# Patient Record
Sex: FEMALE | Race: WHITE | NOT HISPANIC OR LATINO | Employment: UNEMPLOYED | URBAN - METROPOLITAN AREA
[De-identification: names, ages, dates, MRNs, and addresses within clinical notes are randomized per-mention and may not be internally consistent; named-entity substitution may affect disease eponyms.]

---

## 2017-01-24 ENCOUNTER — CONVERSION ENCOUNTER (OUTPATIENT)
Dept: PAIN MEDICINE | Facility: CLINIC | Age: 32
End: 2017-01-24

## 2017-01-24 ENCOUNTER — HOSPITAL ENCOUNTER (OUTPATIENT)
Dept: GENERAL RADIOLOGY | Facility: HOSPITAL | Age: 32
Discharge: HOME | End: 2017-01-24
Attending: NURSE PRACTITIONER | Admitting: NURSE PRACTITIONER

## 2017-02-04 ENCOUNTER — HOSPITAL ENCOUNTER (OUTPATIENT)
Dept: OTHER | Facility: HOSPITAL | Age: 32
Discharge: HOME | End: 2017-02-04
Attending: NURSE PRACTITIONER | Admitting: NURSE PRACTITIONER

## 2017-02-06 ENCOUNTER — CONVERSION ENCOUNTER (OUTPATIENT)
Dept: PAIN MEDICINE | Facility: CLINIC | Age: 32
End: 2017-02-06

## 2017-02-06 ENCOUNTER — HOSPITAL ENCOUNTER (OUTPATIENT)
Dept: GENERAL RADIOLOGY | Facility: HOSPITAL | Age: 32
Discharge: HOME | End: 2017-02-06
Attending: INTERNAL MEDICINE | Admitting: INTERNAL MEDICINE

## 2017-02-08 ENCOUNTER — CONVERSION ENCOUNTER (OUTPATIENT)
Dept: PAIN MEDICINE | Facility: CLINIC | Age: 32
End: 2017-02-08

## 2017-02-08 LAB
ALBUMIN SERPL-MCNC: 4.1 G/DL (ref 3.6–5.1)
ALBUMIN/GLOB SERPL: ABNORMAL {RATIO} (ref 1–2.5)
ALP SERPL-CCNC: 72 UNITS/L (ref 33–115)
ALT SERPL-CCNC: 9 UNITS/L (ref 6–29)
ANA SER QL IA: NEGATIVE
AST SERPL-CCNC: 10 UNITS/L (ref 10–30)
BILIRUB SERPL-MCNC: 0.2 MG/DL (ref 0.2–1.2)
BILIRUB UR QL STRIP: NEGATIVE
BUN SERPL-MCNC: 14 MG/DL (ref 7–25)
BUN/CREAT SERPL: ABNORMAL (ref 6–22)
C3 SERPL-MCNC: 181 MG/DL (ref 90–180)
C4 SERPL-MCNC: 18 MG/DL (ref 16–47)
CALCIUM SERPL-MCNC: 9.6 MG/DL (ref 8.6–10.2)
CHLORIDE SERPL-SCNC: 107 MMOL/L (ref 98–110)
CK SERPL-CCNC: 34 UNITS/L (ref 29–143)
CO2 CONTENT VENOUS: 21 MMOL/L (ref 20–31)
COLOR UR: YELLOW
CONV BACTERIA IN URINE MICRO: ABNORMAL /HPF
CONV HYALINE CASTS IN URINE MICRO: ABNORMAL
CONV PROTEIN IN URINE BY AUTOMATED TEST STRIP: NEGATIVE
CONV TOTAL PROTEIN: 7.6 G/DL (ref 6.1–8.1)
CREAT UR-MCNC: 0.69 MG/DL (ref 0.5–1.1)
CRP SERPL-MCNC: 1.48 MG/DL
CYCLIC CITRULLINATED PEPTIDE ANTIBODY: ABNORMAL
ERYTHROCYTE [SEDIMENTATION RATE] IN BLOOD BY WESTERGREN METHOD: 62 MM/HR
GLOBULIN UR ELPH-MCNC: ABNORMAL G/DL (ref 1.9–3.7)
GLUCOSE SERPL-MCNC: 93 MG/DL (ref 65–99)
GLUCOSE UR QL: NEGATIVE G/DL
HGB UR QL STRIP: NEGATIVE
KETONES UR QL STRIP: NEGATIVE
LEUKOCYTE ESTERASE UR QL STRIP: NEGATIVE
NITRITE UR QL STRIP: NEGATIVE
PH UR STRIP.AUTO: ABNORMAL [PH] (ref 5–8)
POTASSIUM SERPL-SCNC: 4.3 MMOL/L (ref 3.5–5.3)
RBC #/AREA URNS HPF: ABNORMAL /[HPF]
SODIUM SERPL-SCNC: 138 MMOL/L (ref 135–146)
SP GR UR: 1.02 (ref 1–1.03)
SQUAMOUS #/AREA URNS HPF: ABNORMAL /HPF
TSH SERPL-ACNC: 5.15 MICROINTL UNITS/ML
WBC #/AREA URNS HPF: ABNORMAL CELLS/HPF

## 2017-02-10 ENCOUNTER — CONVERSION ENCOUNTER (OUTPATIENT)
Dept: PAIN MEDICINE | Facility: CLINIC | Age: 32
End: 2017-02-10

## 2017-03-10 ENCOUNTER — CONVERSION ENCOUNTER (OUTPATIENT)
Dept: PAIN MEDICINE | Facility: CLINIC | Age: 32
End: 2017-03-10

## 2017-04-17 ENCOUNTER — HOSPITAL ENCOUNTER (OUTPATIENT)
Dept: OTHER | Facility: HOSPITAL | Age: 32
Discharge: HOME | End: 2017-04-17
Attending: INTERNAL MEDICINE | Admitting: INTERNAL MEDICINE

## 2017-04-18 LAB
HLA-B27 QL NAA+PROBE: NEGATIVE
INTERPRETATION: NORMAL

## 2017-04-19 LAB
HAV IGM SERPL QL IA: NONREACTIVE
HBV CORE IGM SERPL QL IA: NONREACTIVE
HBV SURFACE AG SERPL QL IA: NONREACTIVE
HCV AB SER DONR QL: NORMAL
HCV AB SER DONR QL: NORMAL

## 2017-04-20 ENCOUNTER — HOSPITAL ENCOUNTER (OUTPATIENT)
Dept: LAB | Facility: HOSPITAL | Age: 32
Setting detail: SPECIMEN
Discharge: HOME | End: 2017-04-20
Attending: INTERNAL MEDICINE | Admitting: INTERNAL MEDICINE

## 2017-04-20 LAB
M TB TUBERC IFN-G BLD QL: NORMAL

## 2017-05-06 ENCOUNTER — HOSPITAL ENCOUNTER (OUTPATIENT)
Dept: ULTRASOUND IMAGING | Facility: HOSPITAL | Age: 32
Discharge: HOME | End: 2017-05-06
Attending: INTERNAL MEDICINE | Admitting: INTERNAL MEDICINE

## 2017-05-08 ENCOUNTER — CONVERSION ENCOUNTER (OUTPATIENT)
Dept: PAIN MEDICINE | Facility: CLINIC | Age: 32
End: 2017-05-08

## 2017-06-19 ENCOUNTER — CONVERSION ENCOUNTER (OUTPATIENT)
Dept: PAIN MEDICINE | Facility: CLINIC | Age: 32
End: 2017-06-19

## 2017-06-28 ENCOUNTER — CONVERSION ENCOUNTER (OUTPATIENT)
Dept: PAIN MEDICINE | Facility: CLINIC | Age: 32
End: 2017-06-28

## 2017-07-06 ENCOUNTER — CONVERSION ENCOUNTER (OUTPATIENT)
Dept: PAIN MEDICINE | Facility: CLINIC | Age: 32
End: 2017-07-06

## 2017-08-07 ENCOUNTER — CONVERSION ENCOUNTER (OUTPATIENT)
Dept: PAIN MEDICINE | Facility: CLINIC | Age: 32
End: 2017-08-07

## 2017-08-07 ENCOUNTER — HOSPITAL ENCOUNTER (OUTPATIENT)
Dept: LAB | Facility: HOSPITAL | Age: 32
Discharge: HOME | End: 2017-08-07
Attending: NURSE PRACTITIONER | Admitting: NURSE PRACTITIONER

## 2017-08-07 LAB
CRP SERPL-MCNC: 1.22 MG/DL (ref 0–0.7)
TSH SERPL-ACNC: 7.61 UIU/ML (ref 0.34–5.6)

## 2017-08-09 ENCOUNTER — CONVERSION ENCOUNTER (OUTPATIENT)
Dept: PAIN MEDICINE | Facility: CLINIC | Age: 32
End: 2017-08-09

## 2017-08-09 LAB — CHROMATIN AB SERPL-ACNC: <20 [IU]/ML (ref 0–20)

## 2017-08-29 ENCOUNTER — HOSPITAL ENCOUNTER (OUTPATIENT)
Dept: PAIN MEDICINE | Facility: HOSPITAL | Age: 32
Discharge: HOME | End: 2017-08-29
Attending: PHYSICAL MEDICINE & REHABILITATION | Admitting: PHYSICAL MEDICINE & REHABILITATION

## 2017-08-29 ENCOUNTER — CONVERSION ENCOUNTER (OUTPATIENT)
Dept: PAIN MEDICINE | Facility: CLINIC | Age: 32
End: 2017-08-29

## 2017-08-29 LAB
AMPHETAMINES UR QL SCN: NEGATIVE
BZE UR QL SCN: NEGATIVE
CREAT 24H UR-MCNC: NORMAL MG/DL
METHADONE UR QL SCN: NEGATIVE
OPIATE CONFIRMATION URINE: NORMAL
THC SERPLBLD CFM-MCNC: NEGATIVE NG/ML

## 2017-09-08 ENCOUNTER — CONVERSION ENCOUNTER (OUTPATIENT)
Dept: PAIN MEDICINE | Facility: CLINIC | Age: 32
End: 2017-09-08

## 2017-09-12 ENCOUNTER — CONVERSION ENCOUNTER (OUTPATIENT)
Dept: PAIN MEDICINE | Facility: CLINIC | Age: 32
End: 2017-09-12

## 2017-09-12 ENCOUNTER — HOSPITAL ENCOUNTER (OUTPATIENT)
Dept: OTHER | Facility: HOSPITAL | Age: 32
Discharge: HOME | End: 2017-09-12
Attending: PHYSICIAN ASSISTANT | Admitting: PHYSICIAN ASSISTANT

## 2017-09-26 ENCOUNTER — CONVERSION ENCOUNTER (OUTPATIENT)
Dept: PAIN MEDICINE | Facility: CLINIC | Age: 32
End: 2017-09-26

## 2017-09-26 ENCOUNTER — HOSPITAL ENCOUNTER (OUTPATIENT)
Dept: PAIN MEDICINE | Facility: HOSPITAL | Age: 32
Discharge: HOME | End: 2017-09-26
Attending: PHYSICAL MEDICINE & REHABILITATION | Admitting: PHYSICAL MEDICINE & REHABILITATION

## 2017-10-06 ENCOUNTER — CONVERSION ENCOUNTER (OUTPATIENT)
Dept: PAIN MEDICINE | Facility: CLINIC | Age: 32
End: 2017-10-06

## 2017-10-20 ENCOUNTER — CONVERSION ENCOUNTER (OUTPATIENT)
Dept: PAIN MEDICINE | Facility: CLINIC | Age: 32
End: 2017-10-20

## 2017-10-23 ENCOUNTER — HOSPITAL ENCOUNTER (OUTPATIENT)
Dept: PAIN MEDICINE | Facility: HOSPITAL | Age: 32
Discharge: HOME | End: 2017-10-23
Attending: PHYSICAL MEDICINE & REHABILITATION | Admitting: PHYSICAL MEDICINE & REHABILITATION

## 2017-10-23 ENCOUNTER — CONVERSION ENCOUNTER (OUTPATIENT)
Dept: PAIN MEDICINE | Facility: CLINIC | Age: 32
End: 2017-10-23

## 2017-10-24 ENCOUNTER — HOSPITAL ENCOUNTER (OUTPATIENT)
Dept: PREOP | Facility: HOSPITAL | Age: 32
Setting detail: HOSPITAL OUTPATIENT SURGERY
Discharge: HOME | End: 2017-10-24
Attending: SURGERY | Admitting: SURGERY

## 2017-11-09 ENCOUNTER — HOSPITAL ENCOUNTER (OUTPATIENT)
Dept: SLEEP MEDICINE | Facility: HOSPITAL | Age: 32
Discharge: HOME | End: 2017-11-09
Attending: INTERNAL MEDICINE | Admitting: INTERNAL MEDICINE

## 2017-11-09 ENCOUNTER — CONVERSION ENCOUNTER (OUTPATIENT)
Dept: PAIN MEDICINE | Facility: CLINIC | Age: 32
End: 2017-11-09

## 2017-11-15 ENCOUNTER — HOSPITAL ENCOUNTER (OUTPATIENT)
Dept: CARDIOLOGY | Facility: HOSPITAL | Age: 32
Discharge: HOME | End: 2017-11-15
Attending: INTERNAL MEDICINE | Admitting: INTERNAL MEDICINE

## 2017-11-20 ENCOUNTER — CONVERSION ENCOUNTER (OUTPATIENT)
Dept: PAIN MEDICINE | Facility: CLINIC | Age: 32
End: 2017-11-20

## 2017-11-20 ENCOUNTER — HOSPITAL ENCOUNTER (OUTPATIENT)
Dept: PAIN MEDICINE | Facility: HOSPITAL | Age: 32
Discharge: HOME | End: 2017-11-20
Attending: PHYSICAL MEDICINE & REHABILITATION | Admitting: PHYSICAL MEDICINE & REHABILITATION

## 2017-12-08 ENCOUNTER — CONVERSION ENCOUNTER (OUTPATIENT)
Dept: PAIN MEDICINE | Facility: CLINIC | Age: 32
End: 2017-12-08

## 2018-01-19 ENCOUNTER — CONVERSION ENCOUNTER (OUTPATIENT)
Dept: PAIN MEDICINE | Facility: CLINIC | Age: 33
End: 2018-01-19

## 2018-01-23 ENCOUNTER — CONVERSION ENCOUNTER (OUTPATIENT)
Dept: PAIN MEDICINE | Facility: CLINIC | Age: 33
End: 2018-01-23

## 2018-01-23 ENCOUNTER — HOSPITAL ENCOUNTER (OUTPATIENT)
Dept: PAIN MEDICINE | Facility: HOSPITAL | Age: 33
Discharge: HOME | End: 2018-01-23
Attending: PHYSICAL MEDICINE & REHABILITATION | Admitting: PHYSICAL MEDICINE & REHABILITATION

## 2018-02-23 ENCOUNTER — CONVERSION ENCOUNTER (OUTPATIENT)
Dept: PAIN MEDICINE | Facility: CLINIC | Age: 33
End: 2018-02-23

## 2018-03-20 ENCOUNTER — HOSPITAL ENCOUNTER (OUTPATIENT)
Dept: PAIN MEDICINE | Facility: HOSPITAL | Age: 33
Discharge: HOME | End: 2018-03-20
Attending: PHYSICAL MEDICINE & REHABILITATION | Admitting: PHYSICAL MEDICINE & REHABILITATION

## 2018-03-20 ENCOUNTER — CONVERSION ENCOUNTER (OUTPATIENT)
Dept: PAIN MEDICINE | Facility: CLINIC | Age: 33
End: 2018-03-20

## 2018-05-07 ENCOUNTER — CONVERSION ENCOUNTER (OUTPATIENT)
Dept: PAIN MEDICINE | Facility: CLINIC | Age: 33
End: 2018-05-07

## 2018-05-15 ENCOUNTER — HOSPITAL ENCOUNTER (OUTPATIENT)
Dept: PAIN MEDICINE | Facility: HOSPITAL | Age: 33
Discharge: HOME | End: 2018-05-15
Attending: PHYSICAL MEDICINE & REHABILITATION | Admitting: PHYSICAL MEDICINE & REHABILITATION

## 2018-05-15 ENCOUNTER — CONVERSION ENCOUNTER (OUTPATIENT)
Dept: PAIN MEDICINE | Facility: CLINIC | Age: 33
End: 2018-05-15

## 2018-06-15 ENCOUNTER — HOSPITAL ENCOUNTER (OUTPATIENT)
Dept: LAB | Facility: HOSPITAL | Age: 33
Discharge: HOME | End: 2018-06-15
Attending: PHYSICIAN ASSISTANT | Admitting: PHYSICIAN ASSISTANT

## 2018-06-21 LAB — NICOTINE UR-MCNC: 37 NG/ML

## 2018-07-09 ENCOUNTER — CONVERSION ENCOUNTER (OUTPATIENT)
Dept: PAIN MEDICINE | Facility: CLINIC | Age: 33
End: 2018-07-09

## 2018-07-16 ENCOUNTER — CONVERSION ENCOUNTER (OUTPATIENT)
Dept: PAIN MEDICINE | Facility: CLINIC | Age: 33
End: 2018-07-16

## 2018-07-19 ENCOUNTER — CONVERSION ENCOUNTER (OUTPATIENT)
Dept: PAIN MEDICINE | Facility: CLINIC | Age: 33
End: 2018-07-19

## 2018-07-19 ENCOUNTER — HOSPITAL ENCOUNTER (OUTPATIENT)
Dept: PAIN MEDICINE | Facility: HOSPITAL | Age: 33
Discharge: HOME | End: 2018-07-19
Attending: PHYSICAL MEDICINE & REHABILITATION | Admitting: PHYSICAL MEDICINE & REHABILITATION

## 2018-07-23 ENCOUNTER — CONVERSION ENCOUNTER (OUTPATIENT)
Dept: PAIN MEDICINE | Facility: CLINIC | Age: 33
End: 2018-07-23

## 2018-07-30 ENCOUNTER — CONVERSION ENCOUNTER (OUTPATIENT)
Dept: PAIN MEDICINE | Facility: CLINIC | Age: 33
End: 2018-07-30

## 2018-09-06 ENCOUNTER — HOSPITAL ENCOUNTER (OUTPATIENT)
Dept: PAIN MEDICINE | Facility: HOSPITAL | Age: 33
Discharge: HOME | End: 2018-09-06
Attending: PHYSICAL MEDICINE & REHABILITATION | Admitting: PHYSICAL MEDICINE & REHABILITATION

## 2018-09-06 ENCOUNTER — CONVERSION ENCOUNTER (OUTPATIENT)
Dept: PAIN MEDICINE | Facility: CLINIC | Age: 33
End: 2018-09-06

## 2018-09-10 ENCOUNTER — CONVERSION ENCOUNTER (OUTPATIENT)
Dept: PAIN MEDICINE | Facility: CLINIC | Age: 33
End: 2018-09-10

## 2018-09-10 ENCOUNTER — HOSPITAL ENCOUNTER (OUTPATIENT)
Dept: OTHER | Facility: HOSPITAL | Age: 33
Discharge: HOME | End: 2018-09-10
Attending: SURGERY | Admitting: SURGERY

## 2018-09-10 LAB
ALBUMIN SERPL-MCNC: 3.9 G/DL (ref 3.5–4.8)
ALBUMIN/GLOB SERPL: 1 {RATIO} (ref 1–1.7)
ALP SERPL-CCNC: 48 IU/L (ref 32–91)
ALT SERPL-CCNC: 36 IU/L (ref 14–54)
ANION GAP SERPL CALC-SCNC: 14.4 MMOL/L (ref 10–20)
AST SERPL-CCNC: 25 IU/L (ref 15–41)
BASOPHILS # BLD AUTO: 0 10*3/UL (ref 0–0.2)
BASOPHILS NFR BLD AUTO: 1 % (ref 0–2)
BILIRUB SERPL-MCNC: 0.7 MG/DL (ref 0.3–1.2)
BUN SERPL-MCNC: 6 MG/DL (ref 8–20)
BUN/CREAT SERPL: 10 (ref 5.4–26.2)
CALCIUM SERPL-MCNC: 9.4 MG/DL (ref 8.9–10.3)
CHLORIDE SERPL-SCNC: 105 MMOL/L (ref 101–111)
CONV CO2: 21 MMOL/L (ref 22–32)
CONV TOTAL PROTEIN: 7.8 G/DL (ref 6.1–7.9)
CREAT UR-MCNC: 0.6 MG/DL (ref 0.4–1)
DIFFERENTIAL METHOD BLD: (no result)
EOSINOPHIL # BLD AUTO: 0 10*3/UL (ref 0–0.3)
EOSINOPHIL # BLD AUTO: 1 % (ref 0–3)
ERYTHROCYTE [DISTWIDTH] IN BLOOD BY AUTOMATED COUNT: 15.2 % (ref 11.5–14.5)
GLOBULIN UR ELPH-MCNC: 3.9 G/DL (ref 2.5–3.8)
GLUCOSE SERPL-MCNC: 91 MG/DL (ref 65–99)
HCT VFR BLD AUTO: 38.4 % (ref 35–49)
HGB BLD-MCNC: 12.6 G/DL (ref 12–15)
IRON SERPL-MCNC: 40 UG/DL (ref 28–170)
LYMPHOCYTES # BLD AUTO: 1.4 10*3/UL (ref 0.8–4.8)
LYMPHOCYTES NFR BLD AUTO: 22 % (ref 18–42)
MCH RBC QN AUTO: 28.3 PG (ref 26–32)
MCHC RBC AUTO-ENTMCNC: 32.8 G/DL (ref 32–36)
MCV RBC AUTO: 86.2 FL (ref 80–94)
MONOCYTES # BLD AUTO: 0.4 10*3/UL (ref 0.1–1.3)
MONOCYTES NFR BLD AUTO: 7 % (ref 2–11)
NEUTROPHILS # BLD AUTO: 4.4 10*3/UL (ref 2.3–8.6)
NEUTROPHILS NFR BLD AUTO: 69 % (ref 50–75)
NRBC BLD AUTO-RTO: 0 /100{WBCS}
NRBC/RBC NFR BLD MANUAL: 0 10*3/UL
PLATELET # BLD AUTO: 335 10*3/UL (ref 150–450)
PMV BLD AUTO: 8.7 FL (ref 7.4–10.4)
POTASSIUM SERPL-SCNC: 3.4 MMOL/L (ref 3.6–5.1)
PREALB SERPL-MCNC: ABNORMAL MG/DL (ref 16–38)
RBC # BLD AUTO: 4.45 10*6/UL (ref 4–5.4)
SODIUM SERPL-SCNC: 137 MMOL/L (ref 136–144)
WBC # BLD AUTO: 6.4 10*3/UL (ref 4.5–11.5)

## 2018-09-13 ENCOUNTER — HOSPITAL ENCOUNTER (OUTPATIENT)
Dept: PREOP | Facility: HOSPITAL | Age: 33
Setting detail: HOSPITAL OUTPATIENT SURGERY
Discharge: HOME | End: 2018-09-13
Attending: SURGERY | Admitting: SURGERY

## 2018-11-01 ENCOUNTER — HOSPITAL ENCOUNTER (OUTPATIENT)
Dept: PAIN MEDICINE | Facility: HOSPITAL | Age: 33
Discharge: HOME | End: 2018-11-01
Attending: PHYSICAL MEDICINE & REHABILITATION | Admitting: PHYSICAL MEDICINE & REHABILITATION

## 2018-11-01 ENCOUNTER — CONVERSION ENCOUNTER (OUTPATIENT)
Dept: PAIN MEDICINE | Facility: CLINIC | Age: 33
End: 2018-11-01

## 2018-11-19 ENCOUNTER — CONVERSION ENCOUNTER (OUTPATIENT)
Dept: PAIN MEDICINE | Facility: CLINIC | Age: 33
End: 2018-11-19

## 2018-12-20 ENCOUNTER — HOSPITAL ENCOUNTER (OUTPATIENT)
Dept: PAIN MEDICINE | Facility: HOSPITAL | Age: 33
Discharge: HOME | End: 2018-12-20
Attending: PHYSICAL MEDICINE & REHABILITATION | Admitting: PHYSICAL MEDICINE & REHABILITATION

## 2018-12-20 ENCOUNTER — CONVERSION ENCOUNTER (OUTPATIENT)
Dept: PAIN MEDICINE | Facility: CLINIC | Age: 33
End: 2018-12-20

## 2019-01-07 ENCOUNTER — HOSPITAL ENCOUNTER (OUTPATIENT)
Dept: OTHER | Facility: HOSPITAL | Age: 34
Setting detail: SPECIMEN
Discharge: HOME | End: 2019-01-07
Attending: SURGERY | Admitting: SURGERY

## 2019-02-10 ENCOUNTER — HOSPITAL ENCOUNTER (OUTPATIENT)
Dept: URGENT CARE | Facility: CLINIC | Age: 34
Discharge: HOME | End: 2019-02-10
Attending: FAMILY MEDICINE | Admitting: FAMILY MEDICINE

## 2019-02-10 ENCOUNTER — CONVERSION ENCOUNTER (OUTPATIENT)
Dept: PAIN MEDICINE | Facility: CLINIC | Age: 34
End: 2019-02-10

## 2019-02-25 ENCOUNTER — CONVERSION ENCOUNTER (OUTPATIENT)
Dept: PAIN MEDICINE | Facility: CLINIC | Age: 34
End: 2019-02-25

## 2019-02-25 ENCOUNTER — HOSPITAL ENCOUNTER (OUTPATIENT)
Dept: PAIN MEDICINE | Facility: HOSPITAL | Age: 34
Discharge: HOME | End: 2019-02-25
Attending: PHYSICAL MEDICINE & REHABILITATION | Admitting: PHYSICAL MEDICINE & REHABILITATION

## 2019-02-25 LAB
ALBUMIN SERPL-MCNC: 4 G/DL (ref 3.5–4.8)
ALBUMIN/GLOB SERPL: 1.1 {RATIO} (ref 1–1.7)
ALP SERPL-CCNC: 55 IU/L (ref 32–91)
ALT SERPL-CCNC: 9 IU/L (ref 14–54)
ANION GAP SERPL CALC-SCNC: 13.8 MMOL/L (ref 10–20)
AST SERPL-CCNC: 11 IU/L (ref 15–41)
BASOPHILS # BLD AUTO: 0 10*3/UL (ref 0–0.2)
BASOPHILS NFR BLD AUTO: 1 % (ref 0–2)
BILIRUB SERPL-MCNC: 0.5 MG/DL (ref 0.3–1.2)
BUN SERPL-MCNC: 17 MG/DL (ref 8–20)
BUN/CREAT SERPL: 21.3 (ref 5.4–26.2)
CALCIUM SERPL-MCNC: 9.6 MG/DL (ref 8.9–10.3)
CHLORIDE SERPL-SCNC: 105 MMOL/L (ref 101–111)
CONV CO2: 25 MMOL/L (ref 22–32)
CONV TOTAL PROTEIN: 7.8 G/DL (ref 6.1–7.9)
CREAT UR-MCNC: 0.8 MG/DL (ref 0.4–1)
DIFFERENTIAL METHOD BLD: (no result)
EOSINOPHIL # BLD AUTO: 0 10*3/UL (ref 0–0.3)
EOSINOPHIL # BLD AUTO: 1 % (ref 0–3)
ERYTHROCYTE [DISTWIDTH] IN BLOOD BY AUTOMATED COUNT: 13.6 % (ref 11.5–14.5)
GLOBULIN UR ELPH-MCNC: 3.8 G/DL (ref 2.5–3.8)
GLUCOSE SERPL-MCNC: 89 MG/DL (ref 65–99)
HCT VFR BLD AUTO: 34.6 % (ref 35–49)
HGB BLD-MCNC: 11.4 G/DL (ref 12–15)
IRON SERPL-MCNC: 67 UG/DL (ref 28–170)
LYMPHOCYTES # BLD AUTO: 2.2 10*3/UL (ref 0.8–4.8)
LYMPHOCYTES NFR BLD AUTO: 36 % (ref 18–42)
MCH RBC QN AUTO: 30.2 PG (ref 26–32)
MCHC RBC AUTO-ENTMCNC: 32.9 G/DL (ref 32–36)
MCV RBC AUTO: 91.9 FL (ref 80–94)
MONOCYTES # BLD AUTO: 0.5 10*3/UL (ref 0.1–1.3)
MONOCYTES NFR BLD AUTO: 9 % (ref 2–11)
NEUTROPHILS # BLD AUTO: 3.2 10*3/UL (ref 2.3–8.6)
NEUTROPHILS NFR BLD AUTO: 53 % (ref 50–75)
NRBC BLD AUTO-RTO: 0 /100{WBCS}
NRBC/RBC NFR BLD MANUAL: 0 10*3/UL
PLATELET # BLD AUTO: 313 10*3/UL (ref 150–450)
PMV BLD AUTO: 8.5 FL (ref 7.4–10.4)
POTASSIUM SERPL-SCNC: 3.8 MMOL/L (ref 3.6–5.1)
RBC # BLD AUTO: 3.77 10*6/UL (ref 4–5.4)
SODIUM SERPL-SCNC: 140 MMOL/L (ref 136–144)
WBC # BLD AUTO: 6 10*3/UL (ref 4.5–11.5)

## 2019-04-29 ENCOUNTER — CONVERSION ENCOUNTER (OUTPATIENT)
Dept: PAIN MEDICINE | Facility: CLINIC | Age: 34
End: 2019-04-29

## 2019-04-29 ENCOUNTER — HOSPITAL ENCOUNTER (OUTPATIENT)
Dept: PAIN MEDICINE | Facility: HOSPITAL | Age: 34
Discharge: HOME | End: 2019-04-29
Attending: PHYSICAL MEDICINE & REHABILITATION | Admitting: PHYSICAL MEDICINE & REHABILITATION

## 2019-05-17 ENCOUNTER — CONVERSION ENCOUNTER (OUTPATIENT)
Dept: FAMILY MEDICINE CLINIC | Facility: CLINIC | Age: 34
End: 2019-05-17

## 2019-05-28 ENCOUNTER — HOSPITAL ENCOUNTER (OUTPATIENT)
Dept: PAIN MEDICINE | Facility: HOSPITAL | Age: 34
Discharge: HOME | End: 2019-05-28
Attending: PHYSICAL MEDICINE & REHABILITATION | Admitting: PHYSICAL MEDICINE & REHABILITATION

## 2019-05-28 ENCOUNTER — CONVERSION ENCOUNTER (OUTPATIENT)
Dept: PAIN MEDICINE | Facility: CLINIC | Age: 34
End: 2019-05-28

## 2019-06-04 VITALS
WEIGHT: 269 LBS | SYSTOLIC BLOOD PRESSURE: 114 MMHG | DIASTOLIC BLOOD PRESSURE: 80 MMHG | HEART RATE: 74 BPM | HEIGHT: 64 IN | DIASTOLIC BLOOD PRESSURE: 74 MMHG | SYSTOLIC BLOOD PRESSURE: 121 MMHG | BODY MASS INDEX: 44.05 KG/M2 | BODY MASS INDEX: 47.8 KG/M2 | HEART RATE: 84 BPM | WEIGHT: 260 LBS | DIASTOLIC BLOOD PRESSURE: 88 MMHG | OXYGEN SATURATION: 99 % | WEIGHT: 265 LBS | HEART RATE: 63 BPM | RESPIRATION RATE: 16 BRPM | DIASTOLIC BLOOD PRESSURE: 83 MMHG | SYSTOLIC BLOOD PRESSURE: 112 MMHG | RESPIRATION RATE: 16 BRPM | OXYGEN SATURATION: 98 % | DIASTOLIC BLOOD PRESSURE: 89 MMHG | DIASTOLIC BLOOD PRESSURE: 81 MMHG | SYSTOLIC BLOOD PRESSURE: 125 MMHG | BODY MASS INDEX: 44.56 KG/M2 | RESPIRATION RATE: 16 BRPM | BODY MASS INDEX: 45.36 KG/M2 | RESPIRATION RATE: 16 BRPM | RESPIRATION RATE: 20 BRPM | DIASTOLIC BLOOD PRESSURE: 86 MMHG | HEART RATE: 69 BPM | DIASTOLIC BLOOD PRESSURE: 88 MMHG | RESPIRATION RATE: 16 BRPM | OXYGEN SATURATION: 99 % | DIASTOLIC BLOOD PRESSURE: 87 MMHG | SYSTOLIC BLOOD PRESSURE: 144 MMHG | SYSTOLIC BLOOD PRESSURE: 124 MMHG | OXYGEN SATURATION: 99 % | SYSTOLIC BLOOD PRESSURE: 107 MMHG | HEIGHT: 64 IN | OXYGEN SATURATION: 100 % | WEIGHT: 261 LBS | BODY MASS INDEX: 44.84 KG/M2 | OXYGEN SATURATION: 100 % | OXYGEN SATURATION: 99 % | WEIGHT: 261 LBS | WEIGHT: 264 LBS | SYSTOLIC BLOOD PRESSURE: 109 MMHG | WEIGHT: 200 LBS | HEART RATE: 71 BPM | RESPIRATION RATE: 16 BRPM | SYSTOLIC BLOOD PRESSURE: 109 MMHG | RESPIRATION RATE: 16 BRPM | WEIGHT: 263 LBS | RESPIRATION RATE: 16 BRPM | SYSTOLIC BLOOD PRESSURE: 146 MMHG | SYSTOLIC BLOOD PRESSURE: 124 MMHG | BODY MASS INDEX: 44.56 KG/M2 | WEIGHT: 262.25 LBS | HEART RATE: 72 BPM | DIASTOLIC BLOOD PRESSURE: 81 MMHG | BODY MASS INDEX: 44.39 KG/M2 | WEIGHT: 261 LBS | RESPIRATION RATE: 16 BRPM | BODY MASS INDEX: 44.59 KG/M2 | SYSTOLIC BLOOD PRESSURE: 120 MMHG | WEIGHT: 270 LBS | HEART RATE: 88 BPM | WEIGHT: 260 LBS | BODY MASS INDEX: 44.8 KG/M2 | HEART RATE: 87 BPM | HEIGHT: 64 IN | BODY MASS INDEX: 45.01 KG/M2 | WEIGHT: 256.38 LBS | SYSTOLIC BLOOD PRESSURE: 119 MMHG | WEIGHT: 259.4 LBS | SYSTOLIC BLOOD PRESSURE: 108 MMHG | HEIGHT: 64 IN | HEIGHT: 64 IN | SYSTOLIC BLOOD PRESSURE: 156 MMHG | WEIGHT: 270 LBS | RESPIRATION RATE: 16 BRPM | WEIGHT: 265 LBS | WEIGHT: 261 LBS | OXYGEN SATURATION: 96 % | DIASTOLIC BLOOD PRESSURE: 81 MMHG | HEIGHT: 64 IN | OXYGEN SATURATION: 97 % | HEART RATE: 67 BPM | DIASTOLIC BLOOD PRESSURE: 67 MMHG | DIASTOLIC BLOOD PRESSURE: 74 MMHG | BODY MASS INDEX: 41.95 KG/M2 | WEIGHT: 261 LBS | HEART RATE: 90 BPM | HEIGHT: 64 IN | RESPIRATION RATE: 16 BRPM | HEART RATE: 78 BPM | SYSTOLIC BLOOD PRESSURE: 119 MMHG | WEIGHT: 257.5 LBS | RESPIRATION RATE: 16 BRPM | OXYGEN SATURATION: 100 % | RESPIRATION RATE: 16 BRPM | SYSTOLIC BLOOD PRESSURE: 109 MMHG | SYSTOLIC BLOOD PRESSURE: 121 MMHG | WEIGHT: 259.8 LBS | OXYGEN SATURATION: 99 % | SYSTOLIC BLOOD PRESSURE: 123 MMHG | HEART RATE: 70 BPM | SYSTOLIC BLOOD PRESSURE: 116 MMHG | SYSTOLIC BLOOD PRESSURE: 127 MMHG | DIASTOLIC BLOOD PRESSURE: 74 MMHG | OXYGEN SATURATION: 99 % | HEART RATE: 64 BPM | BODY MASS INDEX: 46.34 KG/M2 | HEART RATE: 94 BPM | HEART RATE: 86 BPM | DIASTOLIC BLOOD PRESSURE: 84 MMHG | OXYGEN SATURATION: 99 % | WEIGHT: 266 LBS | SYSTOLIC BLOOD PRESSURE: 114 MMHG | HEART RATE: 92 BPM | HEART RATE: 105 BPM | BODY MASS INDEX: 34.15 KG/M2 | OXYGEN SATURATION: 96 % | SYSTOLIC BLOOD PRESSURE: 128 MMHG | SYSTOLIC BLOOD PRESSURE: 125 MMHG | OXYGEN SATURATION: 100 % | WEIGHT: 244.38 LBS | OXYGEN SATURATION: 98 % | DIASTOLIC BLOOD PRESSURE: 83 MMHG | WEIGHT: 233 LBS | BODY MASS INDEX: 44.39 KG/M2 | OXYGEN SATURATION: 99 % | DIASTOLIC BLOOD PRESSURE: 76 MMHG | DIASTOLIC BLOOD PRESSURE: 79 MMHG | HEART RATE: 82 BPM | DIASTOLIC BLOOD PRESSURE: 80 MMHG | HEART RATE: 71 BPM | DIASTOLIC BLOOD PRESSURE: 89 MMHG | SYSTOLIC BLOOD PRESSURE: 108 MMHG | BODY MASS INDEX: 43.71 KG/M2 | WEIGHT: 256 LBS | WEIGHT: 268 LBS | BODY MASS INDEX: 44.8 KG/M2 | OXYGEN SATURATION: 100 % | OXYGEN SATURATION: 98 % | HEIGHT: 64 IN | BODY MASS INDEX: 44.8 KG/M2 | SYSTOLIC BLOOD PRESSURE: 132 MMHG | HEART RATE: 106 BPM | SYSTOLIC BLOOD PRESSURE: 127 MMHG | WEIGHT: 261.25 LBS | WEIGHT: 258 LBS | BODY MASS INDEX: 43.77 KG/M2 | SYSTOLIC BLOOD PRESSURE: 147 MMHG | HEART RATE: 92 BPM | WEIGHT: 227 LBS | HEIGHT: 64 IN | HEIGHT: 64 IN | BODY MASS INDEX: 44.28 KG/M2 | BODY MASS INDEX: 44.01 KG/M2 | DIASTOLIC BLOOD PRESSURE: 68 MMHG | DIASTOLIC BLOOD PRESSURE: 66 MMHG | DIASTOLIC BLOOD PRESSURE: 82 MMHG | HEART RATE: 72 BPM | DIASTOLIC BLOOD PRESSURE: 87 MMHG | HEART RATE: 94 BPM | HEART RATE: 89 BPM | SYSTOLIC BLOOD PRESSURE: 117 MMHG | OXYGEN SATURATION: 99 % | OXYGEN SATURATION: 99 % | WEIGHT: 254.5 LBS | RESPIRATION RATE: 16 BRPM | OXYGEN SATURATION: 100 % | DIASTOLIC BLOOD PRESSURE: 75 MMHG | OXYGEN SATURATION: 98 % | WEIGHT: 280 LBS | SYSTOLIC BLOOD PRESSURE: 128 MMHG | WEIGHT: 269 LBS | BODY MASS INDEX: 38.96 KG/M2 | OXYGEN SATURATION: 99 % | DIASTOLIC BLOOD PRESSURE: 82 MMHG | HEIGHT: 64 IN | DIASTOLIC BLOOD PRESSURE: 89 MMHG | DIASTOLIC BLOOD PRESSURE: 85 MMHG | DIASTOLIC BLOOD PRESSURE: 74 MMHG | HEART RATE: 79 BPM | SYSTOLIC BLOOD PRESSURE: 124 MMHG | HEART RATE: 81 BPM | HEART RATE: 89 BPM | WEIGHT: 264.25 LBS | HEART RATE: 74 BPM | WEIGHT: 256.38 LBS | HEART RATE: 96 BPM | HEART RATE: 90 BPM | DIASTOLIC BLOOD PRESSURE: 79 MMHG | BODY MASS INDEX: 43.68 KG/M2 | BODY MASS INDEX: 39.78 KG/M2 | DIASTOLIC BLOOD PRESSURE: 77 MMHG | RESPIRATION RATE: 16 BRPM | HEART RATE: 73 BPM | SYSTOLIC BLOOD PRESSURE: 104 MMHG | HEART RATE: 80 BPM | OXYGEN SATURATION: 100 % | RESPIRATION RATE: 18 BRPM | DIASTOLIC BLOOD PRESSURE: 72 MMHG

## 2019-06-04 VITALS
HEART RATE: 60 BPM | WEIGHT: 190 LBS | HEIGHT: 64 IN | WEIGHT: 215 LBS | RESPIRATION RATE: 16 BRPM | RESPIRATION RATE: 16 BRPM | HEIGHT: 64 IN | RESPIRATION RATE: 16 BRPM | HEART RATE: 52 BPM | SYSTOLIC BLOOD PRESSURE: 128 MMHG | RESPIRATION RATE: 16 BRPM | OXYGEN SATURATION: 100 % | SYSTOLIC BLOOD PRESSURE: 128 MMHG | HEART RATE: 51 BPM | DIASTOLIC BLOOD PRESSURE: 79 MMHG | BODY MASS INDEX: 36.9 KG/M2 | RESPIRATION RATE: 16 BRPM | OXYGEN SATURATION: 100 % | DIASTOLIC BLOOD PRESSURE: 70 MMHG | SYSTOLIC BLOOD PRESSURE: 97 MMHG | WEIGHT: 215 LBS | HEART RATE: 80 BPM | OXYGEN SATURATION: 100 % | HEIGHT: 64 IN | SYSTOLIC BLOOD PRESSURE: 137 MMHG | BODY MASS INDEX: 32.44 KG/M2 | SYSTOLIC BLOOD PRESSURE: 110 MMHG | BODY MASS INDEX: 33.72 KG/M2 | DIASTOLIC BLOOD PRESSURE: 66 MMHG | HEART RATE: 59 BPM | WEIGHT: 200.8 LBS | BODY MASS INDEX: 34.28 KG/M2 | OXYGEN SATURATION: 100 % | DIASTOLIC BLOOD PRESSURE: 88 MMHG | HEIGHT: 64 IN | WEIGHT: 197.5 LBS | DIASTOLIC BLOOD PRESSURE: 92 MMHG | BODY MASS INDEX: 36.7 KG/M2

## 2019-06-04 VITALS
HEART RATE: 47 BPM | BODY MASS INDEX: 32.58 KG/M2 | SYSTOLIC BLOOD PRESSURE: 112 MMHG | OXYGEN SATURATION: 100 % | HEIGHT: 64 IN | WEIGHT: 190.8 LBS | DIASTOLIC BLOOD PRESSURE: 75 MMHG

## 2019-06-04 VITALS
SYSTOLIC BLOOD PRESSURE: 111 MMHG | RESPIRATION RATE: 16 BRPM | HEIGHT: 64 IN | WEIGHT: 185 LBS | OXYGEN SATURATION: 98 % | HEART RATE: 77 BPM | DIASTOLIC BLOOD PRESSURE: 75 MMHG | BODY MASS INDEX: 31.58 KG/M2

## 2019-06-06 NOTE — PROGRESS NOTES
"HPI: all over pain, especially back, neck and bilateral hip pain, right knee. Dz with fibromyalgia and inflammatory arthritis by Rheum, on DMARDs and Lyrica 100mg BID at initial visit with poor control, pain worsening, 10/10 at worst, 7/10 at best, 8/10   today, always present, prevents work and housework, burning, sharp, varies. Prior notes reviewed, referred for worsening pain, does not have time for PT. Increased to Lyrica 100mg BID then TID, started Celebrex, Cymbalta 30mg then 60mg qHS, fewer flares   but still severe fibromyalgia pain, also LBP and b/l hip pain. Planning bariatric surgery, has to stop Celebrex 1 week before and 6 weeks after. Started Norco 5/325mg, mostly taking qHS, some days BID with worsening pain with cold weather, becoming less   effective, rotated to Percocet 5/325mg. C/o insomnia. Went to ED with severe pain not controlled with Percocet 5/325mg BID prn, now QID prn. Had gastric sleeve surgery, liquid Norco worked well, restarted Percocet with severe N/V, vomited up complete   prescription. Started liquid Norco, working better, but pain worsening, having difficulty with ADLs, increased Norco and Lyrica, helping. Worsening L \"hip\" pain, insomnia, constipation. Requesting NSAID. Had toni. Has FH of autoimmune disorders.      Referring MD: Michelle Narayanan MD  Age: 34 Years Old  Sex: Female  Race: White    Pain Assessment   Location of Pain: pain all over  Previous Pain Rating : 8  Current Pain Ratin  Last Dose Pain medicine Morphine @ 0300  Epidural History None      Past Medical History:     Reviewed history from 2019 and no changes required:        Asthma        Scoliosis        fibromyalgia        No Drug Allergies? T        low back pain        neck pain        Inflammatory Arthritis        Hypothyroidism        SOA and chest pain with activity        sciatica        morbid obesity        fell and hurt lt knee  end of 2018        Numbness and tingling " on arms            Past Surgical History:     Reviewed history from 2019 and no changes required:        2  C-sections '07 & '11        Tubal 2011        right hand ganglion cyst excision 2015        Notes prior complications from anesthesia: Nausea & vomiting        Denies any history of surgical complications.         Gastric sleeve surgery 18        Cholecystectomy     Family History Summary:      Reviewed history Last on 2019 and no changes required:2019  MGM - Has Family History of Weight Disorder - Entered On: 11/10/2017  MGM - Has Family History of Diabetes - Entered On: 11/10/2017  Father - Has Family History of Hypertension - Entered On: 11/10/2017  Father - Has Family History of Heart Disease - Entered On: 11/10/2017    General Comments - FH:  FH Diabetes  FH Heart Disease  FH-Cancer  FH Hypertension  FH anemia  FH blood thinning    Social History:     Reviewed history from 2019 and no changes required:        Patient has never smoked.        Passive Smoke: Y        Alcohol Use: N        Drug Use: N        HIV/High Risk: N        Regular Exercise: Y                Vital Signs:    Patient Profile:    34 Years Old Female  CC:         All over pain  Height:     64 inches (161.29 cm)  Weight:     185 pounds  BMI:        31.75     O2 Sat:     98 %  Temp:       98.5 degrees F oral  Pulse rate: 77 / minute  Resp:       16 per minute  BP Sittin / 75    Patient has a risk of falls? No  Patient in pain?    Yes    Problems: Active problems were reviewed with the patient during this visit.  Medications: Medications were reviewed with the patient during this visit.  Allergies: Allergies were reviewed with the patient during this visit.        Vitals Entered By: Teagan Parson MA (May 28, 2019 10:40 AM)      Risk Factors:     Smoked Tobacco Use:  Never smoker  Smokeless Tobacco Use:  Never  Passive smoke exposure:  yes  Drug use:  no  HIV high-risk behavior:  no  Caffeine use:  0  drinks per day  Alcohol use:  no  Exercise:  yes     Times per week:  5     Type of Exercise:  walking / housework  Seatbelt use:  100 %  Sun Exposure:  rarely    Family History Risk Factors:     Family History of MI in females < 65 years old:  no     Family History of MI in males < 55 years old:  no    Previous Tobacco Use: Signed On - 05/17/2019  Smoked Tobacco Use:  Never smoker     Cigarettes:  Yes -- occasional smoker pack(s) per day,Smokeless Tobacco Use:  Never  Passive smoke exposure:  yes  Drug use:  no  HIV high-risk behavior:  no  Caffeine use:  0 drinks per day        Review of Systems        See HPI    General       Denies fever, chills and fatigue.    Eyes       Complains of vision loss - both eyes.    ENT       Denies decreased hearing and difficulty swallowing.    CV       Denies chest pain or discomfort.    Resp       Complains of shortness of breath.    GI       Complains of constipation.       Denies nausea, vomiting, abdominal pain, diarrhea and stool incontinence.           Denies inability to control bladder.    MS       Complains of joint pain, back pain and neck pain.       Denies joint swelling.    Derm       Denies rash.    Neuro       Complains of headaches.       Denies numbness, weakness and dizziness.      Physical Exam   General  General appearance: well developed, well nourished, no acute distress  Communication ability: communicates by voice, normal quality  Gait and station: Normal    Mental Status Exam   Mental Status: AAO x3  Thought Content: Intact  Behavior: Appropriate    Head and Face   Inspection: normocephalic, no scars, lesions, or masses  Facial strength: no weakness    Respiratory   Auscultation: clear to auscultation bilaterally, no rales, rhonchi, or wheezes    Cardiovascular   Auscultation: RRR, no murmur, rub, or gallop    Abdomen   Abdomen: soft, non-tender, non-distended, no masses, bowel sounds normal    Extremities   Pedal pulses: pulses 2+, symmetric  Periph.  circulation: no cyanosis, clubbing, edema, or varicosities      EXAM:     Neuro   R Leg 2+  L Leg 2+    Strength: Legs Normal  Sensation: SILT in BLE      Total Score Risk Category   Low Risk 0 - 3   Moderate Risk 4 - 7   High Risk > 8     Assessment   Status of Existing Problems:  Assessed Constipation, drug induced as unchanged - Espinoza Rivero MD  Assessed Sacroiliitis, not elsewhere classified as unchanged - Espinoza Rivero MD  Assessed Pain in joint involving multiple sites as deteriorated - Espinoza Rivero MD  Assessed Neck pain, chronic as deteriorated - Espinoza Rivero MD  Assessed Low back pain as deteriorated - Espinoza Rivero MD    Plan   New Prescriptions/Refills:  LYRICA 200 MG ORAL CAPSULE (PREGABALIN) Take 1 cap PO TID  #90 x 1,  05/28/2019, Espinoza Rivero MD  MORPHINE SULFATE 15 MG ORAL TABLET (MORPHINE SULFATE) Take 1 tab PO q8h prn pain. DX:M54.5. DNF until 6/28/19.  #90 x 0,  05/28/2019, Espinoza Rivero MD  MORPHINE SULFATE 15 MG ORAL TABLET (MORPHINE SULFATE) Take 1 tab PO q8h prn pain. DX:M54.5. DNF until 5/29/19.  #90 x 0,  05/28/2019, Espinoza Rivero MD    Updated Medication List:   MORPHINE SULFATE 15 MG ORAL TABLET (MORPHINE SULFATE) Take 1 tab PO q8h prn pain. DX:M54.5. DNF until 6/28/19.  MELOXICAM 7.5 MG ORAL TABLET (MELOXICAM) Take 1 tab PO qdaily - BID prn pain  SILENOR 6 MG ORAL TABLET (DOXEPIN HCL) Take 1 tab PO qHS prn insomnia  FLINSTONES GUMMIES OMEGA-3 DHA ORAL TABLET CHEWABLE (PEDIATRIC MULTIPLE VIT-C-FA) Take one (1) tablet by mouth twice a day  MIRALAX ORAL POWDER (POLYETHYLENE GLYCOL 3350) 1 scoop in 4-6 oz of liquid daily prn constipation  CALCIUM + D TABLET (CALCIUM CITRATE-VITAMIN D TABS) take 2,000 units of Vitamin D with 1500mg of Calcium po daily  RANITIDINE  MG ORAL CAPSULE (RANITIDINE HCL) take one capsule po BID  LEVOTHYROXINE 75 MCG TABLET (LEVOTHYROXINE SODIUM) take 1 tablet by mouth once daily  LYRICA 200 MG ORAL CAPSULE  (PREGABALIN) Take 1 cap PO TID  PROAIR  (90 Base) MCG/ACT INHALATION AEROSOL SOLUTION (ALBUTEROL SULFATE) 2 PUFFS 4 TIMES A DAY AS NEEDED FOR SHORTNESS OF AIR    New Orders:   [facility]  Ofc Vst, Est Level IV [10995]        Patient Instructions:  1)  INspect reviewed, in order. Low risk.  2)  Increased to Lyrica 200mg TID, sedating.  3)  Cont Cymbalta 60mg qHS, helping, and sleeping better.  4)  Stopped Norco 5/325mg, failed Percocet 5/325mg, started liquid Norco 7.5/325mg/15ml 15ml QID prn, #1800. Increased to q4h prn, #2700, no longer working. Cont MS-IR 15mg q8h prn, has pill , helping a lot more.  5)  Failed Mobic 7.5mg qd - BID prn, failed Celebrex.  6)  Cont Silenor.  7)  Cont Relistor 450mg qdaily, failed Miralax, colace.  8)  Cont Precision compounded cream, helping.  9)  Referral to Rheum.  10)  RTC in 2 months for f/u.    ]      Electronically signed by Espinoza Rivero MD on 05/28/2019 at 11:05 AM  ________________________________________________________________________       Disclaimer: Converted Note message may not contain all data elements that existed in the legacy source system. Please see [employer] System for the original note details.

## 2019-06-13 NOTE — PROGRESS NOTES
History of Present Illness:   Chief Complaint: the pt presented for psych eval for bariatric procedure   Symptomatology:  hx of mood/anxiety problems related to fibromyalgia ,  gets frustrated with pain and feels depressed and unhappy      mood is currently relatively stable on meds, rated as  3/10    The pt suffered from excessive weight since getting pregant wiht 2nd child , unable to lose weight after pregnancy, wiht 3rd child - weight is worse , she was not moving a lot due ot fibrolyalgia     This pt had appropriate reasons for seeking bariatric surgery including health issues   The pt also hopes to increase activity level with significant weight loss     The pt reported multiple weight loss attempts including dieting and diet pills , rxd by weight loss md   The most successful attempt was losing    20 lbs        and all past weight loss attempts have only provided temporary relief   The pt denied difficulties perceiving weight loss in the past     Healthy eating habits include 3 meals per day, eggs , yogurt, chicken     Occasional stress eating in reaction to boredom and admitted to eating as a self reward, or while watching movie     Currently 264 lbs   BMI 45  Precipitating and Ameliorating Factors: pain       PAST PSYCHIATRIC HISTORY     Previous Psychiatric Diagnoses   Patient has no prior history of psychiatric illness or treatment.    Past Hospitalizations or Residential Treatment   Locations\Providers: none     Past Outpatient Treatment   Location: only PCP     Prior Psychiatric Medications   Comments: cymbalta       Consequences of Mental Disorder   Consequences: emotional distress    SOCIAL HISTORY     Number of marriages: 1  Number of children: 3  Current Relationship is: supportive  Family of Origin is: unstable  Comments: Marital Status: single , engaged now   Children: 3  Occupation:homemaker  Flu vaccine-2015-negative  Occasional EtOH, denies illicits, smokes 4 cigs/day    Current Living  Situation   Lives with: significant other    Education   Level: some college    Employment   Job Status: unemployed    Hobbies and Leisure Activities   Activity Type: quiet activities    Alcohol use   Freq. drinking: social  Smoking History   Smoking Hx: Never smoker  Pack per day: occasional smoker    Exercise   Exercise sessions (per wk): 2    Illicit Drug Use   Illicit Drugs used: none    FAMILY HISTORY OF MENTAL DISORDERS   fh Grandparents: Non-contributory  fh Mother: Non-contributory  fh Father: Non-contributory  fh Siblings: Non-contributory  fh Other: Non-contributory    Approx length of hospitalization     Medication History Obesity, Thyroid Problems  Additional Med Hx comments: fibromyalgia  asthma, arthritis , bacl pain     Current Allergies (reviewed this update):  * NKDA (Critical)    Current Medications (including medications started this update):   CELEBREX 100 MG ORAL CAPS (CELECOXIB) take one tablet po daily  RANITIDINE  MG ORAL CAPS (RANITIDINE HCL) take one capsule po BID  CELECOXIB 100 MG ORAL CAPS (CELECOXIB) Take 1 tab PO qdaily  CYMBALTA 30 MG ORAL CPEP (DULOXETINE HCL) Take 1 tab PO qHS  LEVO-T 75 MCG ORAL TABS (LEVOTHYROXINE SODIUM) 1 tab po once daily  CYCLOBENZAPRINE HCL 10 MG ORAL TABS (CYCLOBENZAPRINE HCL) Take 0.5 to 1 tab po at bedtime  AZULFIDINE 500 MG ORAL TABS (SULFASALAZINE) one tab po BID  LYRICA 100 MG CAPS (PREGABALIN) Take one by mouth BID  PROAIR  (90 BASE) MCG/ACT AERS (ALBUTEROL SULFATE) 2 PUFFS 4 TIMES A DAY AS NEEDED FOR SHORTNESS OF AIR      Review of Symptoms  Manic Episode/Bipolar Disorder evidenced by symptoms of persistently elevated, expansive, or irritable mood, lasting >7 days (or less if hospitalized):     The patient denies: grandiosity/inc. self esteem, decreased need for sleep, more talkative/pressured speech, FOI/racing thoughts, distractability, increase in goal-direted activity or psychomotor agitation, excessive involvement in pleasurable  activities   with high potential for painful consequences  Hypomania evidenced by persistently elevated, expansive, or irritable mood lasting 4 days:  The patient denies: grandiosity/inc.self esteem, decreased need for sleep, more talkative/pressured speech, FOI/racing thoughts, distractability, increase in goal-directed activity or psychomotor agitation, excessive involvement in pleasurable activities   with high potential for painful consequences  Depression as evidenced by either depressed mood or loss of interest or pleasure lasting > 2 weeks:  The patient complians of: decreased interest, decreased energy/fatigue, excessive guilt/worthlessness  The patient denies: depressed mood, significant weight loss/decreased appetite, insomnia/hypersomnia, psychomotor agitation/retardation, poor concentration or indecisiveness, recurrent thoughts of death/suicidal ideation  Dysthymia as evidenced by depressed mood most of day, more days than not, for at least two years:   The patient complians of: low energy/fatigue, low self-esteem  The patient denies: poor appetite or overeating, insomnia/hypersomnia, poor concentration or decision making, hopelessness  Risk Assessment:  Suicidality:  The Complains of: no prior history  The patient denies: past ideation, past gestures, means and date: (explain below), Family history of suicide, Access to firearms, Past hospitalizations for suicide attempt  Violence:   none   Schizophrenia or other psychotic disorder:  The patient denies: delusions, hallucinations, disorganized behavior, negative symptoms  Cognitive Impairment Disorder / Delirium / Dementia:  Comments: none   Alcohol / Substance related disorder:   Maladaptive pattern with impairment or distress with 3 or more of the following:  The patient denies: tolerance, withdrawal, increased quant/duration,effort of cut down, time spent to obtain/or recover from effect of substance, loss of social/occup/recreat due to  substance,continued use despite physical/psychological problem  Abuse:  as evidenced by pattern of use leading to impariment or distress, as manifested by one or more of the following:   The patient denies: use resulting in failure to fulfill major role obligations, use in situations which are physically hazardous,related legal problems, continued use despite soc/interpersonal problems  Anxiety Disorder:  The complains of: excessive worry for >6 months, irritability, muscle tension  The patient denies: unable to control worry, restlessness, easily fatigued, poor concentration or mind goes blank, sleep disturbance  For Panic Disorder:   future attacks or implications or consequences of attacks or change in behavior    Comments: The patient denies symptoms of anxiety disorder.  For Social Phobia:   The patient denies: fear of social or performance situations/exposed to unfamiliar people or scrutiny of others, exposure provokes anxiety or situationally predisposed panic attack, person recognizes fear is excessive, feared social situation avoided or   endured with intense anxiety  Eating Disorders:   The patient denies: refusal to maintain body weight, fear of gaining weight/becoming fat despite underweight, body weight or shape disturbance, absence of 3 or > consecutive menstrual cycles  The patient denies: recurrent episodes of binge eating with sense of lack or control, recurrent compensatory behavior, binge eating and inappropriate compensatory behaviors both occur at least 2X/week for 3 months, self-evaluation is unduly influenced by   body shape and weight, not exclusively during episodes of anorexia nervosa  ADHD:   The patient denies: 6 or more symptoms>6 months of inattention, lack of attn to details or careless mistakes, diff sustained attention, does not seem to listen, does not follow instructions or finish school work or chores or duties in the work place,   difficulty organizing tasks, avoids and dislikes  tasks that require sustained attn,often loses things needed for tasks or activities, easily distracted by extraneous stimuli, forgetful in daily activitis, 6 or more symptoms>6 months of   hyperactivity-impulsivity, figits/squirms, leaves seat in classroom, often runs about or climbs excessively,difficulty playing or engaging in leisure activities quietly, often on the go, often talks excessively, often blurts out answers, often has   difficulty awaiting turn, often interrupts or intrudes on others    Mental Status Examination    General Appearance:   good hygiene, apperas stated age  Comments:  obese     Behavior: good eye contact, normal motor activity      Attitude/Cooperation:   cooperative and appropriately verbal, patient is pleasant on approach      Speech  : coherent thoughts-organized and easy to follow, normal rate, normal flow and prosody      Thought Processes:   goal directed and associations logical      Thought Content/Perceptions A: WNL  The patient also appears to have  no depersonalization or derealization    Thought Content/Perceptions B:   WNL  The patient also appears to have no obsessions or compulsions or phobias    Risk Assessment:    Suicidality:   not present    Homicidality:   not present      Affect:   expressive and appro. to content with full range      Mood:   euthymic, fluctuates      Insight/Judgement:   intact insight and judgement      Cognitive Functioning and Sensorium:   oriented to person and place and time, memory WNL      Intellect:   estimated (based on interview)      Fund of Knowledge:   adequate      Significant Personality Traits:   deferred; longitudinal view needed    judged reliable      Assessment  Problem List Updates for today's visit   1. Added Body Mass Index 45.0-49.9, adult (ICD-V85.42)  2. Assessed Morbid obesity (ICD-278.01) as stable  3. Assessed presurgery evaluation (ICD-V72.84) as stable  4. Assessed Body Mass Index 45.0-49.9, adult (ICD-V85.42) as  stable  Additional Assessment  No Drug Allergies?       Plan  Orders for today's visit  1. Ordered 29596 PSYCHIATRIC DIAGNOSTIC EVAL W/O MEDS [CPT-15065]  Instructions for today's visit  safety plan was discussed with the patient   no contraindicaions for baritric procedure   Risk and Benefit of medication discussed with patient, patient verbalized understanding.    Disposition: no follow-up planned        Risk Factors:     Smoked Tobacco Use:  Never smoker  Drug use:  none  Alcohol use:  no    PHQ-9  PHQ-9 completed, Score: 8 - Mild Depression      Review of Systems   General:        Denies fevers, chills, sweats.    Musculoskeletal:        Complains of back pain, muscle weakness, stiffness.    Psychiatric:        Complains of depression.         Denies anxiety.            Electronically signed by Luz Marina Mackenzie MD on 09/12/2017 at 10:11 AM  ________________________________________________________________________       Disclaimer: Converted Note message may not contain all data elements that existed in the legacy source system. Please see [employer] for the original note details.

## 2019-07-23 ENCOUNTER — OFFICE VISIT (OUTPATIENT)
Dept: PAIN MEDICINE | Facility: CLINIC | Age: 34
End: 2019-07-23

## 2019-07-23 VITALS
HEART RATE: 53 BPM | RESPIRATION RATE: 16 BRPM | WEIGHT: 180 LBS | TEMPERATURE: 98.4 F | HEIGHT: 63 IN | DIASTOLIC BLOOD PRESSURE: 67 MMHG | BODY MASS INDEX: 31.89 KG/M2 | OXYGEN SATURATION: 100 % | SYSTOLIC BLOOD PRESSURE: 102 MMHG

## 2019-07-23 DIAGNOSIS — G89.29 CHRONIC MIDLINE LOW BACK PAIN, WITH SCIATICA PRESENCE UNSPECIFIED: ICD-10-CM

## 2019-07-23 DIAGNOSIS — M54.5 CHRONIC MIDLINE LOW BACK PAIN, WITH SCIATICA PRESENCE UNSPECIFIED: ICD-10-CM

## 2019-07-23 DIAGNOSIS — M46.1 SACROILIITIS, NOT ELSEWHERE CLASSIFIED (HCC): ICD-10-CM

## 2019-07-23 DIAGNOSIS — M54.2 NECK PAIN, CHRONIC: ICD-10-CM

## 2019-07-23 DIAGNOSIS — F19.90 CURRENT DRUG USE: Primary | ICD-10-CM

## 2019-07-23 DIAGNOSIS — M79.7 FIBROMYALGIA: ICD-10-CM

## 2019-07-23 DIAGNOSIS — G89.29 NECK PAIN, CHRONIC: ICD-10-CM

## 2019-07-23 DIAGNOSIS — K59.03 DRUG-INDUCED CONSTIPATION: Primary | ICD-10-CM

## 2019-07-23 DIAGNOSIS — M25.50 PAIN IN JOINT INVOLVING MULTIPLE SITES: ICD-10-CM

## 2019-07-23 PROCEDURE — 99214 OFFICE O/P EST MOD 30 MIN: CPT | Performed by: PHYSICAL MEDICINE & REHABILITATION

## 2019-07-23 PROCEDURE — G0463 HOSPITAL OUTPT CLINIC VISIT: HCPCS | Performed by: PHYSICAL MEDICINE & REHABILITATION

## 2019-07-23 RX ORDER — DOXEPIN HYDROCHLORIDE 6 MG/1
TABLET ORAL EVERY 24 HOURS
COMMUNITY
Start: 2018-12-20 | End: 2019-08-13

## 2019-07-23 RX ORDER — MORPHINE SULFATE 15 MG/1
15 TABLET ORAL EVERY 6 HOURS PRN
Qty: 120 TABLET | Refills: 0 | Status: SHIPPED | OUTPATIENT
Start: 2019-07-23 | End: 2019-07-23 | Stop reason: SDUPTHER

## 2019-07-23 RX ORDER — RANITIDINE 150 MG/1
CAPSULE ORAL
COMMUNITY
Start: 2017-08-29 | End: 2019-08-13 | Stop reason: SDUPTHER

## 2019-07-23 RX ORDER — D-METHORPHAN/PE/ACETAMINOPHEN 10-5-325MG
CAPSULE ORAL
COMMUNITY
Start: 2018-11-19

## 2019-07-23 RX ORDER — MORPHINE SULFATE 15 MG/1
15 TABLET ORAL EVERY 6 HOURS PRN
Qty: 120 TABLET | Refills: 0 | Status: SHIPPED | OUTPATIENT
Start: 2019-07-23 | End: 2019-09-24 | Stop reason: SDUPTHER

## 2019-07-23 RX ORDER — ALBUTEROL SULFATE 90 UG/1
AEROSOL, METERED RESPIRATORY (INHALATION)
COMMUNITY
Start: 2016-06-17 | End: 2019-08-13 | Stop reason: SDUPTHER

## 2019-07-23 RX ORDER — LEVOTHYROXINE SODIUM 0.07 MG/1
TABLET ORAL EVERY 24 HOURS
COMMUNITY
Start: 2017-10-09 | End: 2019-08-13 | Stop reason: SDUPTHER

## 2019-07-23 RX ORDER — MELOXICAM 7.5 MG/1
TABLET ORAL
COMMUNITY
Start: 2019-02-25 | End: 2019-08-13

## 2019-07-23 RX ORDER — MORPHINE SULFATE 15 MG/1
TABLET ORAL
Refills: 0 | COMMUNITY
Start: 2019-06-28 | End: 2019-07-23 | Stop reason: SDUPTHER

## 2019-07-23 NOTE — PROGRESS NOTES
"Subjective   Alyson Lew is a 34 y.o. female.     all over pain, especially back, neck and bilateral hip pain, right knee. Dz with fibromyalgia and inflammatory arthritis by Rheum, on DMARDs and Lyrica 100mg BID at initial visit with poor control, pain worsening, 10/10 at worst, 7/10 at best, 8/10 today, always present, prevents work and housework, burning, sharp, varies. Prior notes reviewed, referred for worsening pain, does not have time for PT. Increased to Lyrica 100mg BID then TID, started Celebrex, Cymbalta 30mg then 60mg qHS, fewer flares but still severe fibromyalgia pain, also LBP and b/l hip pain. Planning bariatric surgery, has to stop Celebrex 1 week before and 6 weeks after. Started Norco 5/325mg, mostly taking qHS, some days BID with worsening pain with cold weather, becoming less effective, rotated to Percocet 5/325mg. C/o insomnia. Went to ED with severe pain not controlled with Percocet 5/325mg BID prn, now QID prn. Had gastric sleeve surgery, liquid Norco worked well, restarted Percocet with severe N/V, vomited up complete prescription. Started liquid Norco, working better, but pain worsening, having difficulty with ADLs, increased Norco and Lyrica, helping. Worsening L \"hip\" pain, insomnia, constipation. Requesting NSAID. Had toni. Has FH of autoimmune disorders and MS, hasn't heard from Rheum. Needs PT before having injection. MS-IR not lasting 8 hours.         The following portions of the patient's history were reviewed and updated as appropriate: allergies, current medications, past family history, past medical history, past social history, past surgical history and problem list.    Review of Systems   Constitutional: Negative for chills, fatigue and fever.   HENT: Negative for hearing loss and trouble swallowing.    Eyes: Positive for visual disturbance.   Respiratory: Positive for shortness of breath.    Cardiovascular: Negative for chest pain.   Gastrointestinal: Positive for " constipation. Negative for abdominal pain, diarrhea, nausea and vomiting.   Genitourinary: Negative for urinary incontinence.   Musculoskeletal: Positive for arthralgias, back pain and neck pain. Negative for joint swelling and myalgias.   Neurological: Positive for headache. Negative for dizziness, weakness and numbness.       Objective   Physical Exam   Constitutional: She is oriented to person, place, and time. She appears well-developed and well-nourished.   HENT:   Head: Normocephalic and atraumatic.   Eyes: EOM are normal. Pupils are equal, round, and reactive to light.   Neck: Normal range of motion.   Cardiovascular: Normal rate, regular rhythm, normal heart sounds and intact distal pulses.   Pulmonary/Chest: Breath sounds normal.   Abdominal: Soft. Bowel sounds are normal. She exhibits no distension. There is no tenderness.   Neurological: She is alert and oriented to person, place, and time. She has normal strength and normal reflexes. She displays normal reflexes. No sensory deficit.   Psychiatric: She has a normal mood and affect. Her behavior is normal. Thought content normal.         Assessment/Plan   Alyson was seen today for back pain.    Diagnoses and all orders for this visit:    Drug-induced constipation    Pain in joint involving multiple sites    Fibromyalgia    Chronic midline low back pain, with sciatica presence unspecified    Neck pain, chronic    Sacroiliitis, not elsewhere classified (CMS/[facility])        INspect reviewed, in order. Low risk. Repeat UDS today.  Increased to Lyrica 200mg TID, sedating, numbs her lips, but benefit outweighs side effects.  Cont Cymbalta 60mg qHS, helping, and sleeping better.  Stopped Norco 5/325mg, failed Percocet 5/325mg, started liquid Norco 7.5/325mg/15ml 15ml QID prn, #1800. Increased to q4h prn, #2700, no longer working. Increase to MS-IR 15mg q6h prn, has pill , helping a lot more.  Failed Mobic 7.5mg qd - BID prn, failed Celebrex.  Cont Silenor.  Cont  Relistor 450mg qdaily, failed Miralax, colace.  Cont Precision compounded cream, helping.  New referral to Rheum.  Referral to PT for LBP and L hip pain.  RTC in 2 months for f/u.

## 2019-08-13 ENCOUNTER — LAB (OUTPATIENT)
Dept: FAMILY MEDICINE CLINIC | Facility: CLINIC | Age: 34
End: 2019-08-13

## 2019-08-13 ENCOUNTER — OFFICE VISIT (OUTPATIENT)
Dept: FAMILY MEDICINE CLINIC | Facility: CLINIC | Age: 34
End: 2019-08-13

## 2019-08-13 VITALS
TEMPERATURE: 98 F | HEART RATE: 54 BPM | DIASTOLIC BLOOD PRESSURE: 71 MMHG | SYSTOLIC BLOOD PRESSURE: 108 MMHG | HEIGHT: 63 IN | BODY MASS INDEX: 30.65 KG/M2 | OXYGEN SATURATION: 100 % | WEIGHT: 173 LBS

## 2019-08-13 DIAGNOSIS — G47.00 INSOMNIA, UNSPECIFIED TYPE: ICD-10-CM

## 2019-08-13 DIAGNOSIS — J45.909 ASTHMA, UNSPECIFIED ASTHMA SEVERITY, UNSPECIFIED WHETHER COMPLICATED, UNSPECIFIED WHETHER PERSISTENT: ICD-10-CM

## 2019-08-13 DIAGNOSIS — Z00.00 PREVENTATIVE HEALTH CARE: ICD-10-CM

## 2019-08-13 DIAGNOSIS — M25.561 ACUTE PAIN OF RIGHT KNEE: Primary | ICD-10-CM

## 2019-08-13 DIAGNOSIS — E03.9 HYPOTHYROIDISM, UNSPECIFIED TYPE: ICD-10-CM

## 2019-08-13 DIAGNOSIS — R12 HEARTBURN: ICD-10-CM

## 2019-08-13 PROBLEM — M19.90 INFLAMMATORY ARTHRITIS: Status: ACTIVE | Noted: 2017-02-06

## 2019-08-13 PROBLEM — Z01.818 PREOPERATIVE EVALUATION TO RULE OUT SURGICAL CONTRAINDICATION: Status: ACTIVE | Noted: 2017-09-12

## 2019-08-13 PROBLEM — Z79.899 OTHER LONG TERM (CURRENT) DRUG THERAPY: Status: ACTIVE | Noted: 2017-08-29

## 2019-08-13 PROBLEM — E78.5 DYSLIPIDEMIA: Status: ACTIVE | Noted: 2017-10-20

## 2019-08-13 PROBLEM — E66.01 MORBID OBESITY: Status: ACTIVE | Noted: 2017-06-19

## 2019-08-13 PROBLEM — M25.511 SHOULDER PAIN, RIGHT: Status: ACTIVE | Noted: 2017-06-19

## 2019-08-13 PROBLEM — Z71.3 DIETARY COUNSELING AND SURVEILLANCE: Status: ACTIVE | Noted: 2017-07-06

## 2019-08-13 PROBLEM — E51.9 THIAMINE DEFICIENCY: Status: ACTIVE | Noted: 2018-09-18

## 2019-08-13 PROBLEM — S63.632A SPRAIN OF INTERPHALANGEAL JOINT OF RIGHT MIDDLE FINGER: Status: ACTIVE | Noted: 2019-02-10

## 2019-08-13 PROBLEM — Z98.84 STATUS POST BARIATRIC SURGERY: Status: ACTIVE | Noted: 2018-07-30

## 2019-08-13 LAB
ARTICHOKE IGE QN: 122 MG/DL (ref 0–100)
CHOLEST SERPL-MCNC: 161 MG/DL
HDLC SERPL QL: 4.03
HDLC SERPL-MCNC: 40 MG/DL
LDLC/HDLC SERPL: 2.72 {RATIO}
TRIGL SERPL-MCNC: 61 MG/DL
TSH SERPL DL<=0.05 MIU/L-ACNC: 1.08 MIU/ML (ref 0.34–5.6)
VLDLC SERPL-MCNC: 12.2 MG/DL

## 2019-08-13 PROCEDURE — 36415 COLL VENOUS BLD VENIPUNCTURE: CPT

## 2019-08-13 PROCEDURE — 84443 ASSAY THYROID STIM HORMONE: CPT | Performed by: NURSE PRACTITIONER

## 2019-08-13 PROCEDURE — 80061 LIPID PANEL: CPT | Performed by: NURSE PRACTITIONER

## 2019-08-13 PROCEDURE — 99214 OFFICE O/P EST MOD 30 MIN: CPT | Performed by: NURSE PRACTITIONER

## 2019-08-13 RX ORDER — DOXEPIN HYDROCHLORIDE 6 MG/1
TABLET ORAL EVERY 24 HOURS
Status: CANCELLED | OUTPATIENT
Start: 2019-08-13

## 2019-08-13 RX ORDER — ALBUTEROL SULFATE 90 UG/1
2 AEROSOL, METERED RESPIRATORY (INHALATION) EVERY 4 HOURS PRN
Qty: 1 INHALER | Refills: 1 | Status: SHIPPED | OUTPATIENT
Start: 2019-08-13 | End: 2020-11-04 | Stop reason: SDUPTHER

## 2019-08-13 RX ORDER — TRAZODONE HYDROCHLORIDE 150 MG/1
150 TABLET ORAL NIGHTLY
Qty: 30 TABLET | Refills: 0 | Status: SHIPPED | OUTPATIENT
Start: 2019-08-13 | End: 2021-11-29 | Stop reason: SINTOL

## 2019-08-13 RX ORDER — RANITIDINE 150 MG/1
150 CAPSULE ORAL EVERY EVENING
Qty: 90 CAPSULE | Refills: 1 | Status: SHIPPED | OUTPATIENT
Start: 2019-08-13 | End: 2020-01-14 | Stop reason: ALTCHOICE

## 2019-08-13 RX ORDER — MELOXICAM 15 MG/1
15 TABLET ORAL DAILY
Qty: 30 TABLET | Refills: 0 | Status: SHIPPED | OUTPATIENT
Start: 2019-08-13 | End: 2019-10-15

## 2019-08-13 RX ORDER — LEVOTHYROXINE SODIUM 0.07 MG/1
75 TABLET ORAL DAILY
Qty: 30 TABLET | Refills: 1 | Status: SHIPPED | OUTPATIENT
Start: 2019-08-13

## 2019-08-13 NOTE — PATIENT INSTRUCTIONS
Start mobic- no other NSAIDs  Physical therapy  Perform home exercises  Stop silanor and start trazadone for sleep  F/u 1 month  Obtain labs

## 2019-08-13 NOTE — PROGRESS NOTES
"Subjective   Alyson Lew is a 34 y.o. female.     Pt is here today with c/o right knee pain for 2-3 days.  She states that she was walking up the stairs and slipped.  She did not fall on her knee but she did twist it.  She has had a constant burning and throbbing sensation near the knee that is a 8/10.  She is having to limp around the house.  She has not taken any additional pain medication besides what she is on with pain management which is more pain.  Pain does not seem to be improving.  The swelling that she initially had has improved    Having trouble sleeping.  Patient states that the Silenor does not seem to be working regularly anymore.  She is wondering if there is any other medication that she can use to help her fall asleep and stay asleep.  She is also tried melatonin without success    Patient reports that she needs refills on numerous meds.  She hasnt taken synthroid in one month.  She is needing her thyroid level checked.         The following portions of the patient's history were reviewed and updated as appropriate: allergies, current medications, past family history, past medical history, past social history, past surgical history and problem list.    Review of Systems   Constitutional: Negative for chills, diaphoresis, fatigue and fever.   Eyes: Negative for blurred vision and double vision.   Respiratory: Negative for cough, shortness of breath and wheezing.    Cardiovascular: Negative for chest pain and palpitations.   Gastrointestinal: Negative for abdominal pain, nausea and vomiting.   Musculoskeletal: Positive for arthralgias, back pain and myalgias.        Right knee is painful on the lateral side with some bruising.  It is a burning and throbbing sensation       Objective   /71 (BP Location: Right arm, Patient Position: Sitting, Cuff Size: Adult)   Pulse 54   Temp 98 °F (36.7 °C) (Oral)   Ht 160 cm (63\")   Wt 78.5 kg (173 lb)   SpO2 100%   BMI 30.65 kg/m²   Physical Exam "   Constitutional: She is oriented to person, place, and time. She appears well-developed and well-nourished.   HENT:   Head: Normocephalic and atraumatic.   Eyes: EOM are normal. Pupils are equal, round, and reactive to light.   Neck: Normal range of motion. Neck supple.   Cardiovascular: Normal rate and regular rhythm.   Pulmonary/Chest: Effort normal and breath sounds normal.   Abdominal: Soft. Bowel sounds are normal.   Musculoskeletal: Normal range of motion. She exhibits tenderness. She exhibits no edema.   No crepitus or bony abnormality of the right knee.  Bruising noted on the kneecap.  Tenderness associated along the lateral side of the knee.  Slight discomfort with varus and valgus test but no laxity noted   Neurological: She is alert and oriented to person, place, and time.   Skin: Skin is warm and dry.   Bruising right knee   Psychiatric: She has a normal mood and affect. Her behavior is normal. Judgment and thought content normal.         Assessment/Plan   Problems Addressed this Visit        Respiratory    Asthma    Relevant Medications    albuterol sulfate HFA (PROAIR HFA) 108 (90 Base) MCG/ACT inhaler       Digestive    Heartburn    Relevant Medications    ranitidine (ZANTAC) 150 MG capsule       Endocrine    Hypothyroidism    Relevant Medications    levothyroxine (LEVO-T) 75 MCG tablet    Other Relevant Orders    TSH (Completed)       Other    Insomnia    Relevant Medications    traZODone (DESYREL) 150 MG tablet      Other Visit Diagnoses     Acute pain of right knee    -  Primary    Start taking anti-inflammatory medication.  Physical therapy referral  If no improvement call    Relevant Medications    meloxicam (MOBIC) 15 MG tablet    Preventative health care        Relevant Orders    Lipid panel (Completed)              Diagnoses and all orders for this visit:    1. Acute pain of right knee (Primary)  Comments:  Start taking anti-inflammatory medication.  Physical therapy referral  If no  improvement call  Orders:  -     meloxicam (MOBIC) 15 MG tablet; Take 1 tablet by mouth Daily.  Dispense: 30 tablet; Refill: 0    2. Insomnia, unspecified type  Comments:  Will switch from insulin order to trazodone.  Patient to follow-up in 1 month.  Orders:  -     traZODone (DESYREL) 150 MG tablet; Take 1 tablet by mouth Every Night.  Dispense: 30 tablet; Refill: 0    3. Hypothyroidism, unspecified type  -     TSH; Future  -     levothyroxine (LEVO-T) 75 MCG tablet; Take 1 tablet by mouth Daily.  Dispense: 30 tablet; Refill: 1    4. Preventative health care  -     Lipid panel; Future    5. Asthma, unspecified asthma severity, unspecified whether complicated, unspecified whether persistent  -     albuterol sulfate HFA (PROAIR HFA) 108 (90 Base) MCG/ACT inhaler; Inhale 2 puffs Every 4 (Four) Hours As Needed for Wheezing.  Dispense: 1 inhaler; Refill: 1    6. Heartburn  -     ranitidine (ZANTAC) 150 MG capsule; Take 1 capsule by mouth Every Evening.  Dispense: 90 capsule; Refill: 1    Other orders  -     Cancel: Doxepin HCl (SILENOR) 6 MG tablet; Daily.

## 2019-09-05 ENCOUNTER — OFFICE VISIT (OUTPATIENT)
Dept: FAMILY MEDICINE CLINIC | Facility: CLINIC | Age: 34
End: 2019-09-05

## 2019-09-05 VITALS
HEIGHT: 63 IN | HEART RATE: 66 BPM | OXYGEN SATURATION: 98 % | WEIGHT: 170 LBS | SYSTOLIC BLOOD PRESSURE: 117 MMHG | BODY MASS INDEX: 30.12 KG/M2 | DIASTOLIC BLOOD PRESSURE: 80 MMHG

## 2019-09-05 DIAGNOSIS — G47.00 INSOMNIA, UNSPECIFIED TYPE: Primary | ICD-10-CM

## 2019-09-05 DIAGNOSIS — J06.9 UPPER RESPIRATORY TRACT INFECTION, UNSPECIFIED TYPE: ICD-10-CM

## 2019-09-05 DIAGNOSIS — K59.00 CONSTIPATION, UNSPECIFIED CONSTIPATION TYPE: ICD-10-CM

## 2019-09-05 DIAGNOSIS — R42 DIZZINESS: ICD-10-CM

## 2019-09-05 DIAGNOSIS — M25.561 ACUTE PAIN OF RIGHT KNEE: ICD-10-CM

## 2019-09-05 DIAGNOSIS — M25.552 LEFT HIP PAIN: ICD-10-CM

## 2019-09-05 PROCEDURE — 99214 OFFICE O/P EST MOD 30 MIN: CPT | Performed by: NURSE PRACTITIONER

## 2019-09-05 RX ORDER — FLUTICASONE PROPIONATE 50 MCG
2 SPRAY, SUSPENSION (ML) NASAL DAILY
Qty: 1 BOTTLE | Refills: 2 | Status: SHIPPED | OUTPATIENT
Start: 2019-09-05 | End: 2021-11-29

## 2019-09-05 RX ORDER — BENZONATATE 100 MG/1
100 CAPSULE ORAL 3 TIMES DAILY PRN
Qty: 30 CAPSULE | Refills: 0 | Status: SHIPPED | OUTPATIENT
Start: 2019-09-05 | End: 2019-10-15

## 2019-09-05 NOTE — PATIENT INSTRUCTIONS
Try samples of Linzess to see if that helps with constipation.  Call and let me know if you would like a prescription  Drink plenty of water  Physical therapy ordered  Referral to Dr. Dmitri lea as needed for cough  Continue dayquil and nyquil  Start using flonase nasal spray daily  Referral to cardiology for dizziness

## 2019-09-05 NOTE — PROGRESS NOTES
Subjective   Alyson Lew is a 34 y.o. female.     Patient is here today to follow-up on insomnia. She is also having some issues with with her right knee and sinus issues.     She was switched 1 month ago from Silenor to trazodone and she states that she is doing better and getting more restful sleep.  She has been in much better moods when she wakes up.  She is happy with the medication and current dose.    She reports that she injured her right knee when in [place].  She was started on mobic for inflammation, which did not help her at all.  She is on other medication through pain management.  She is still having a burning sensation on the lateral side of her right knee that is intermittent.  It has not improved over the last month.  She reports that her knee has been giving out on her.  She was supposed to be seeing physical therapy but they have not been returning her calls. Movement makes pain worse.  Propping it up tends to help with the burning.    She has been having issues with her left hip for years.  She was told previously that her hip does not sit properly in the socket.  She states that some mornings she has issues even getting out of bed. She was supposed to be seeing PT for this as well but hasn't heard from them.  She is on pain medication and tried taking the mobic without relief. She reports that movement makes it worse.  Some days she doesn't have any pain.    Pt reports that two days ago she started having a sore throat, congestion, cough, and earache.  She tried using lozenges for throat. She has taken dayquil and nyquil yesterday, which helped some. She reports that she has had a fever and some chills.  She is cautious to take medication because of the pain meds that she is on.       Pt reports that if she stands too quickly she will get dizzy and her vision changes for a second.  Her family has a history of POTs.  She has had an CT in the past which was normal.  Reports that it does not happen  "when she turns her head just when she goes from laying to sitting or sitting to standing too quickly.         The following portions of the patient's history were reviewed and updated as appropriate: allergies, current medications, past family history, past medical history, past social history, past surgical history and problem list.    Review of Systems   Constitutional: Negative for chills, fatigue and fever.   HENT: Positive for congestion, ear pain and sore throat.    Eyes: Positive for blurred vision (with position changes).   Respiratory: Positive for cough. Negative for chest tightness and shortness of breath.    Cardiovascular: Negative for chest pain and palpitations.   Gastrointestinal: Positive for constipation. Negative for abdominal pain, diarrhea, nausea and vomiting.   Neurological: Positive for dizziness (with position changes). Negative for headache.       Objective   /80 (BP Location: Right arm, Patient Position: Standing, Cuff Size: Adult)   Pulse 66   Ht 160 cm (63\")   Wt 77.1 kg (170 lb)   SpO2 98%   BMI 30.11 kg/m²   Physical Exam   Constitutional: She is oriented to person, place, and time. She appears well-developed and well-nourished. No distress.   HENT:   Head: Normocephalic and atraumatic.   Eyes: Conjunctivae and EOM are normal. Pupils are equal, round, and reactive to light.   Cardiovascular: Normal rate and regular rhythm.   Pulmonary/Chest: Effort normal and breath sounds normal.   Abdominal: Soft. Bowel sounds are normal.   Musculoskeletal: Normal range of motion. She exhibits tenderness.   Pain associated with varus and valgus and ant drawer test on right knee   Neurological: She is alert and oriented to person, place, and time.   Negative Hallpike maneuver  No nystagmus noted   Skin: Skin is warm and dry.   Psychiatric: She has a normal mood and affect. Her behavior is normal. Judgment and thought content normal.         Assessment/Plan   Problems Addressed this Visit  "       Other    Insomnia - Primary      Other Visit Diagnoses     Acute pain of right knee        No improvement with Mobic  Has not been to physical therapy  Orthopedic referral to Dr. Rizvi    Relevant Orders    Ambulatory Referral to Orthopedic Surgery    Ambulatory Referral to Physical Therapy Evaluate and treat    Left hip pain        Physical therapy and orthopedic referral to Dr. Rizvi    Relevant Orders    Ambulatory Referral to Orthopedic Surgery    Ambulatory Referral to Physical Therapy Evaluate and treat    Dizziness        Unknown etiology  Reports had a negative MRI in the past  Negative orthostatic vital signs  Will refer to cardiology    Relevant Orders    Ambulatory Referral to Cardiology    Upper respiratory tract infection, unspecified type        Likely viral  Use Flonase daily  Continue DayQuil and NyQuil  Tessalon Perles as needed for cough  Drink plenty of fluids    Relevant Medications    fluticasone (FLONASE) 50 MCG/ACT nasal spray    Constipation, unspecified constipation type        Possibly narcotic induced  Increase fluid intake  Unable to tolerate MiraLAX  Given a sample of Linzess 145              Diagnoses and all orders for this visit:    1. Insomnia, unspecified type (Primary)  Comments:  Doing well on trazodone  No changes at this time    2. Acute pain of right knee  Comments:  No improvement with Mobic  Has not been to physical therapy  Orthopedic referral to Dr. Rizvi  Orders:  -     Ambulatory Referral to Orthopedic Surgery  -     Ambulatory Referral to Physical Therapy Evaluate and treat    3. Left hip pain  Comments:  Physical therapy and orthopedic referral to Dr. Rizvi  Orders:  -     Ambulatory Referral to Orthopedic Surgery  -     Ambulatory Referral to Physical Therapy Evaluate and treat    4. Dizziness  Comments:  Unknown etiology  Reports had a negative MRI in the past  Negative orthostatic vital signs  Will refer to cardiology  Orders:  -     Cancel: Ambulatory  Referral to Cardiology  -     Ambulatory Referral to Cardiology    5. Upper respiratory tract infection, unspecified type  Comments:  Likely viral  Use Flonase daily  Continue DayQuil and NyQuil  Tessalon Perles as needed for cough  Drink plenty of fluids  Orders:  -     fluticasone (FLONASE) 50 MCG/ACT nasal spray; 2 sprays into the nostril(s) as directed by provider Daily.  Dispense: 1 bottle; Refill: 2    6. Constipation, unspecified constipation type  Comments:  Possibly narcotic induced  Increase fluid intake  Unable to tolerate MiraLAX  Given a sample of Linzess 145    Other orders  -     benzonatate (TESSALON PERLES) 100 MG capsule; Take 1 capsule by mouth 3 (Three) Times a Day As Needed for Cough.  Dispense: 30 capsule; Refill: 0  -     linaclotide (LINZESS) 145 MCG capsule capsule; Take 1 capsule by mouth Every Morning Before Breakfast.  Dispense: 30 capsule

## 2019-09-24 ENCOUNTER — OFFICE VISIT (OUTPATIENT)
Dept: PAIN MEDICINE | Facility: CLINIC | Age: 34
End: 2019-09-24

## 2019-09-24 VITALS
SYSTOLIC BLOOD PRESSURE: 96 MMHG | RESPIRATION RATE: 16 BRPM | TEMPERATURE: 98.3 F | OXYGEN SATURATION: 100 % | HEART RATE: 63 BPM | WEIGHT: 168 LBS | HEIGHT: 63 IN | DIASTOLIC BLOOD PRESSURE: 60 MMHG | BODY MASS INDEX: 29.77 KG/M2

## 2019-09-24 DIAGNOSIS — M25.50 PAIN IN JOINT INVOLVING MULTIPLE SITES: ICD-10-CM

## 2019-09-24 DIAGNOSIS — G89.29 NECK PAIN, CHRONIC: ICD-10-CM

## 2019-09-24 DIAGNOSIS — M54.2 NECK PAIN, CHRONIC: ICD-10-CM

## 2019-09-24 DIAGNOSIS — M79.601 PAIN IN RIGHT ARM: ICD-10-CM

## 2019-09-24 DIAGNOSIS — G89.29 CHRONIC RIGHT SHOULDER PAIN: ICD-10-CM

## 2019-09-24 DIAGNOSIS — K59.03 DRUG-INDUCED CONSTIPATION: Primary | ICD-10-CM

## 2019-09-24 DIAGNOSIS — M46.1 SACROILIITIS, NOT ELSEWHERE CLASSIFIED (HCC): ICD-10-CM

## 2019-09-24 DIAGNOSIS — M25.511 CHRONIC RIGHT SHOULDER PAIN: ICD-10-CM

## 2019-09-24 DIAGNOSIS — M79.7 FIBROMYALGIA: ICD-10-CM

## 2019-09-24 DIAGNOSIS — M54.5 CHRONIC MIDLINE LOW BACK PAIN, WITH SCIATICA PRESENCE UNSPECIFIED: ICD-10-CM

## 2019-09-24 DIAGNOSIS — G89.29 CHRONIC MIDLINE LOW BACK PAIN, WITH SCIATICA PRESENCE UNSPECIFIED: ICD-10-CM

## 2019-09-24 PROCEDURE — G0463 HOSPITAL OUTPT CLINIC VISIT: HCPCS | Performed by: PHYSICAL MEDICINE & REHABILITATION

## 2019-09-24 PROCEDURE — 99214 OFFICE O/P EST MOD 30 MIN: CPT | Performed by: PHYSICAL MEDICINE & REHABILITATION

## 2019-09-24 RX ORDER — MORPHINE SULFATE 15 MG/1
15 TABLET ORAL EVERY 6 HOURS PRN
Qty: 120 TABLET | Refills: 0 | Status: SHIPPED | OUTPATIENT
Start: 2019-09-24 | End: 2019-09-24 | Stop reason: SDUPTHER

## 2019-09-24 RX ORDER — MORPHINE SULFATE 15 MG/1
15 TABLET ORAL EVERY 6 HOURS PRN
Qty: 120 TABLET | Refills: 0 | Status: SHIPPED | OUTPATIENT
Start: 2019-09-24 | End: 2019-12-17

## 2019-09-24 NOTE — PROGRESS NOTES
"Subjective   Alyson Lew is a 34 y.o. female.     all over pain, especially back, neck and bilateral hip pain, right knee. Dz with fibromyalgia and inflammatory arthritis by Rheum, on DMARDs and Lyrica 100mg BID at initial visit with poor control, pain worsening, 10/10 at worst, 7/10 at best, always present, prevents work and housework, burning, sharp, varies. Prior notes reviewed, referred for worsening pain, does not have time for PT. Increased to Lyrica 100mg BID then TID, started Celebrex, Cymbalta 30mg then 60mg qHS, fewer flares but still severe fibromyalgia pain, also LBP and b/l hip pain. Started Norco 5/325mg, mostly taking qHS, some days BID with worsening pain with cold weather, becoming less effective, rotated to Percocet 5/325mg. C/o insomnia. Went to ED with severe pain not controlled with Percocet 5/325mg BID prn, now QID prn. Had gastric sleeve surgery, liquid Norco worked well, restarted Percocet with severe N/V, vomited up complete prescription. Started liquid Norco, working better, but pain worsening, having difficulty with ADLs, increased Norco and Lyrica, then MS-IR 15mg QID prn with pill . Worsening L \"hip\" pain, insomnia, constipation. Requesting NSAID. Had toni. Has FH of autoimmune disorders and MS, hasn't heard from Rheum. Needs PT before having injection, hasn't been able to start, worsening SIJ pain.         The following portions of the patient's history were reviewed and updated as appropriate: allergies, current medications, past family history, past medical history, past social history, past surgical history and problem list.    Review of Systems   Constitutional: Negative for chills, fatigue and fever.   HENT: Negative for hearing loss and trouble swallowing.    Eyes: Positive for visual disturbance.   Respiratory: Positive for shortness of breath.    Cardiovascular: Negative for chest pain.   Gastrointestinal: Positive for constipation. Negative for abdominal pain, " diarrhea, nausea and vomiting.   Genitourinary: Negative for urinary incontinence.   Musculoskeletal: Positive for arthralgias, back pain and neck pain. Negative for joint swelling and myalgias.   Neurological: Positive for headache. Negative for dizziness, weakness and numbness.       Objective   Physical Exam   Constitutional: She is oriented to person, place, and time. She appears well-developed and well-nourished.   HENT:   Head: Normocephalic and atraumatic.   Eyes: EOM are normal. Pupils are equal, round, and reactive to light.   Neck: Normal range of motion.   Cardiovascular: Normal rate, regular rhythm, normal heart sounds and intact distal pulses.   Pulmonary/Chest: Breath sounds normal.   Abdominal: Soft. Bowel sounds are normal. She exhibits no distension. There is no tenderness.   Neurological: She is alert and oriented to person, place, and time. She has normal strength and normal reflexes. She displays normal reflexes. No sensory deficit.   Psychiatric: She has a normal mood and affect. Her behavior is normal. Thought content normal.         Assessment/Plan   Alyson was seen today for back pain.    Diagnoses and all orders for this visit:    Drug-induced constipation    Pain in joint involving multiple sites    Fibromyalgia    Chronic midline low back pain, with sciatica presence unspecified    Neck pain, chronic    Pain in right arm    Chronic right shoulder pain    Sacroiliitis, not elsewhere classified (CMS/HCC)        INspect reviewed, in order. Low risk. Repeat UDS today.  Increased to Lyrica 200mg TID, sedating, numbs her lips, but benefit outweighs side effects.  Cont Cymbalta 60mg qHS, helping, and sleeping better.  Stopped Norco 5/325mg, failed Percocet 5/325mg, started liquid Norco 7.5/325mg/15ml 15ml QID prn, #1800. Increased to q4h prn, #2700, no longer working. Increase to MS-IR 15mg q6h prn, has pill , helping a lot more.  Failed Mobic 7.5mg qd - BID prn, failed Celebrex.  Cont  Silenor.  Cont Relistor 450mg qdaily, failed Miralax, colace.  Cont [employer]ision compounded cream, helping.  New referral to Rheum.  Referral to PT for LBP and L hip pain.  Schedule L SIJ injection.  RTC for injection then in 3 months for f/u.      ADDEND: SIJ injection denied for not documenting full physical exam. During exam, SIJ pain was identified by positive gianni finger test, TTP over SIJ, CARINA, and Gaenslens, which are performed whenever SIJ pain is suspected.

## 2019-09-25 ENCOUNTER — TELEPHONE (OUTPATIENT)
Dept: PAIN MEDICINE | Facility: HOSPITAL | Age: 34
End: 2019-09-25

## 2019-09-26 ENCOUNTER — TELEPHONE (OUTPATIENT)
Dept: PAIN MEDICINE | Facility: HOSPITAL | Age: 34
End: 2019-09-26

## 2019-09-26 NOTE — TELEPHONE ENCOUNTER
Flector patch was also denied by insurance because it is not on formulary. Can we offer her samples of the ZTlido if we have some available? We also have that form from the mail order pharmacy that may be able to help her out with her insurance or offer her a discounted rate if we send that in.

## 2019-10-08 ENCOUNTER — HOSPITAL ENCOUNTER (OUTPATIENT)
Dept: GENERAL RADIOLOGY | Facility: HOSPITAL | Age: 34
Discharge: HOME | End: 2019-10-08
Admitting: ORTHOPAEDIC SURGERY

## 2019-10-08 ENCOUNTER — HOSPITAL ENCOUNTER (OUTPATIENT)
Dept: GENERAL RADIOLOGY | Facility: HOSPITAL | Age: 34
Discharge: HOME | End: 2019-10-08

## 2019-10-08 DIAGNOSIS — M25.561 RIGHT KNEE PAIN: ICD-10-CM

## 2019-10-08 DIAGNOSIS — M25.551 RIGHT HIP PAIN: ICD-10-CM

## 2019-10-08 PROCEDURE — 73562 X-RAY EXAM OF KNEE 3: CPT

## 2019-10-08 PROCEDURE — 73502 X-RAY EXAM HIP UNI 2-3 VIEWS: CPT

## 2019-10-15 ENCOUNTER — TREATMENT (OUTPATIENT)
Dept: PHYSICAL THERAPY | Facility: CLINIC | Age: 34
End: 2019-10-15

## 2019-10-15 ENCOUNTER — LAB (OUTPATIENT)
Dept: LAB | Facility: HOSPITAL | Age: 34
End: 2019-10-15

## 2019-10-15 ENCOUNTER — OFFICE VISIT (OUTPATIENT)
Dept: CARDIOLOGY | Facility: CLINIC | Age: 34
End: 2019-10-15

## 2019-10-15 VITALS
BODY MASS INDEX: 28.34 KG/M2 | SYSTOLIC BLOOD PRESSURE: 108 MMHG | HEIGHT: 64 IN | HEART RATE: 53 BPM | WEIGHT: 166 LBS | DIASTOLIC BLOOD PRESSURE: 60 MMHG

## 2019-10-15 DIAGNOSIS — M54.50 CHRONIC LOW BACK PAIN, UNSPECIFIED BACK PAIN LATERALITY, UNSPECIFIED WHETHER SCIATICA PRESENT: ICD-10-CM

## 2019-10-15 DIAGNOSIS — R55 NEAR SYNCOPE: ICD-10-CM

## 2019-10-15 DIAGNOSIS — R00.1 BRADYCARDIA, SINUS: ICD-10-CM

## 2019-10-15 DIAGNOSIS — G89.29 CHRONIC LOW BACK PAIN, UNSPECIFIED BACK PAIN LATERALITY, UNSPECIFIED WHETHER SCIATICA PRESENT: ICD-10-CM

## 2019-10-15 DIAGNOSIS — M25.561 RIGHT KNEE PAIN, UNSPECIFIED CHRONICITY: Primary | ICD-10-CM

## 2019-10-15 DIAGNOSIS — M25.552 PAIN OF LEFT HIP JOINT: ICD-10-CM

## 2019-10-15 DIAGNOSIS — R06.09 DYSPNEA ON EXERTION: ICD-10-CM

## 2019-10-15 DIAGNOSIS — R00.2 PALPITATIONS: ICD-10-CM

## 2019-10-15 DIAGNOSIS — R42 DIZZINESS: Primary | ICD-10-CM

## 2019-10-15 LAB
ANION GAP SERPL CALCULATED.3IONS-SCNC: 14.1 MMOL/L (ref 5–15)
BASOPHILS # BLD AUTO: 0.03 10*3/MM3 (ref 0–0.2)
BASOPHILS NFR BLD AUTO: 0.6 % (ref 0–1.5)
BUN BLD-MCNC: 14 MG/DL (ref 6–20)
BUN/CREAT SERPL: 23.3 (ref 7–25)
CALCIUM SPEC-SCNC: 9.5 MG/DL (ref 8.6–10.5)
CHLORIDE SERPL-SCNC: 103 MMOL/L (ref 98–107)
CO2 SERPL-SCNC: 25.9 MMOL/L (ref 22–29)
CREAT BLD-MCNC: 0.6 MG/DL (ref 0.57–1)
DEPRECATED RDW RBC AUTO: 44.2 FL (ref 37–54)
EOSINOPHIL # BLD AUTO: 0.11 10*3/MM3 (ref 0–0.4)
EOSINOPHIL NFR BLD AUTO: 2.3 % (ref 0.3–6.2)
ERYTHROCYTE [DISTWIDTH] IN BLOOD BY AUTOMATED COUNT: 13.7 % (ref 12.3–15.4)
GFR SERPL CREATININE-BSD FRML MDRD: 115 ML/MIN/1.73
GLUCOSE BLD-MCNC: 80 MG/DL (ref 65–99)
HCT VFR BLD AUTO: 33.5 % (ref 34–46.6)
HGB BLD-MCNC: 10.7 G/DL (ref 12–15.9)
IMM GRANULOCYTES # BLD AUTO: 0 10*3/MM3 (ref 0–0.05)
IMM GRANULOCYTES NFR BLD AUTO: 0 % (ref 0–0.5)
LYMPHOCYTES # BLD AUTO: 2.04 10*3/MM3 (ref 0.7–3.1)
LYMPHOCYTES NFR BLD AUTO: 43.2 % (ref 19.6–45.3)
MAGNESIUM SERPL-MCNC: 2.3 MG/DL (ref 1.6–2.6)
MCH RBC QN AUTO: 28.8 PG (ref 26.6–33)
MCHC RBC AUTO-ENTMCNC: 31.9 G/DL (ref 31.5–35.7)
MCV RBC AUTO: 90.3 FL (ref 79–97)
MONOCYTES # BLD AUTO: 0.53 10*3/MM3 (ref 0.1–0.9)
MONOCYTES NFR BLD AUTO: 11.2 % (ref 5–12)
NEUTROPHILS # BLD AUTO: 2.01 10*3/MM3 (ref 1.7–7)
NEUTROPHILS NFR BLD AUTO: 42.7 % (ref 42.7–76)
NRBC BLD AUTO-RTO: 0 /100 WBC (ref 0–0.2)
PLATELET # BLD AUTO: 291 10*3/MM3 (ref 140–450)
PMV BLD AUTO: 10.8 FL (ref 6–12)
POTASSIUM BLD-SCNC: 4.2 MMOL/L (ref 3.5–5.2)
RBC # BLD AUTO: 3.71 10*6/MM3 (ref 3.77–5.28)
SODIUM BLD-SCNC: 143 MMOL/L (ref 136–145)
WBC NRBC COR # BLD: 4.72 10*3/MM3 (ref 3.4–10.8)

## 2019-10-15 PROCEDURE — 97162 PT EVAL MOD COMPLEX 30 MIN: CPT | Performed by: PHYSICAL THERAPIST

## 2019-10-15 PROCEDURE — 99203 OFFICE O/P NEW LOW 30 MIN: CPT | Performed by: NURSE PRACTITIONER

## 2019-10-15 PROCEDURE — 80048 BASIC METABOLIC PNL TOTAL CA: CPT

## 2019-10-15 PROCEDURE — 97110 THERAPEUTIC EXERCISES: CPT | Performed by: PHYSICAL THERAPIST

## 2019-10-15 PROCEDURE — 93000 ELECTROCARDIOGRAM COMPLETE: CPT | Performed by: NURSE PRACTITIONER

## 2019-10-15 PROCEDURE — 85025 COMPLETE CBC W/AUTO DIFF WBC: CPT

## 2019-10-15 PROCEDURE — 83735 ASSAY OF MAGNESIUM: CPT

## 2019-10-15 PROCEDURE — 36415 COLL VENOUS BLD VENIPUNCTURE: CPT

## 2019-10-15 NOTE — PROGRESS NOTES
Cardiology Office New Patient Visit      Primary Care Provider:  Michelle Narayanan MD    Reason for Visit:     Palpitations  Dizziness  Dyspnea with exertion  Near syncope        Subjective     CC:    Palpitations, dizziness, dyspnea with exertion    History of Present Illness       Alyson Lew is a 34 y.o. female.  She comes to the office today for evaluation of complaints of palpitations associated with dizziness and periods of near syncopal episodes that she describes as blacking out.  She had no thomas syncope or falls to the ground associated with this.    She complains of periods of dyspnea with exertion and also at times dyspnea at rest.  She reports feelings of her heart pounding.  She also reports that her blood pressure sometimes runs low and her systolic blood pressures in the 90s she feels like the symptoms are more frequent.    Her past medical history is significant for obesity with previous gastric sleeve surgery.  She has a history of thyroid disorder and fibromyalgia.    She had a previous exercise treadmill test proximal 2 years ago but has had no prior echocardiogram.    Family history significant for pots in her father.  No reported coronary artery disease.    She reports some occasional discomfort in her chest but no true exertional type angina symptoms.    She is never been treated for hypertension or diabetes.  She reports since her gastric sleeve surgery she is lost approximately 100 pounds.        Past Medical History:   Diagnosis Date   • Cancer (CMS/HCC)    • Chronic pain disorder    • Depression    • Extremity pain    • Fibromyalgia, primary    • Headache    • Joint pain    • Low back pain    • Neck pain    • Peripheral neuropathy    • PTSD (post-traumatic stress disorder)    • Stroke (CMS/HCC)     Mini heat stroke       Past Surgical History:   Procedure Laterality Date   •  SECTION      x2   • CHOLECYSTECTOMY     • ENDOSCOPY     • GASTRIC SLEEVE LAPAROSCOPIC     •  HAND SURGERY           Current Outpatient Medications:   •  albuterol sulfate HFA (PROAIR HFA) 108 (90 Base) MCG/ACT inhaler, Inhale 2 puffs Every 4 (Four) Hours As Needed for Wheezing., Disp: 1 inhaler, Rfl: 1  •  diclofenac (FLECTOR) 1.3 % patch patch, Apply 1 patch topically to the appropriate area as directed 2 (Two) Times a Day As Needed (pain)., Disp: 60 patch, Rfl: 2  •  fluticasone (FLONASE) 50 MCG/ACT nasal spray, 2 sprays into the nostril(s) as directed by provider Daily., Disp: 1 bottle, Rfl: 2  •  levothyroxine (LEVO-T) 75 MCG tablet, Take 1 tablet by mouth Daily., Disp: 30 tablet, Rfl: 1  •  Lidocaine (ZTLIDO) 1.8 % patch, Apply 1 patch topically Every 12 (Twelve) Hours As Needed (pain). 12 hours on and 12 hours off, Disp: 30 patch, Rfl: 2  •  linaclotide (LINZESS) 145 MCG capsule capsule, Take 1 capsule by mouth Every Morning Before Breakfast., Disp: 30 capsule, Rfl:   •  LYRICA 200 MG capsule, Take 1 capsule by mouth 3 (Three) Times a Day., Disp: 90 capsule, Rfl: 2  •  Morphine (MSIR) 15 MG tablet, Take 15 mg by mouth Every 6 (Six) Hours As Needed for Severe Pain ., Disp: 120 tablet, Rfl: 0  •  Pediatric Multiple Vit-C-FA (FLINSTONES GUMMIES OMEGA-3 DHA) chewable tablet, FLINSTONES GUMMIES OMEGA-3 DHA CHEW, Disp: , Rfl:   •  ranitidine (ZANTAC) 150 MG capsule, Take 1 capsule by mouth Every Evening., Disp: 90 capsule, Rfl: 1  •  traZODone (DESYREL) 150 MG tablet, Take 1 tablet by mouth Every Night., Disp: 30 tablet, Rfl: 0    Social History     Socioeconomic History   • Marital status:      Spouse name: Not on file   • Number of children: Not on file   • Years of education: Not on file   • Highest education level: Not on file   Tobacco Use   • Smoking status: Never Smoker   Substance and Sexual Activity   • Alcohol use: Yes     Comment: 2 times a year   • Drug use: No       Family History   Problem Relation Age of Onset   • No Known Problems Mother    • Cancer Father    • Coronary artery  "disease Father    • Diabetes Father    • Stroke Father    • Heart attack Father    • Mental illness Brother    • Mental illness Son        The following portions of the patient's history were reviewed and updated as appropriate: allergies, current medications, past family history, past medical history, past social history, past surgical history and problem list.    Review of Systems   Constitution: Positive for weakness. Negative for decreased appetite and diaphoresis.   HENT: Negative for congestion, hearing loss and nosebleeds.    Cardiovascular: Positive for dyspnea on exertion, near-syncope and palpitations. Negative for chest pain, claudication, irregular heartbeat, leg swelling, orthopnea, paroxysmal nocturnal dyspnea and syncope.   Respiratory: Negative for cough, shortness of breath and sleep disturbances due to breathing.    Endocrine: Negative for polyuria.   Hematologic/Lymphatic: Does not bruise/bleed easily.   Skin: Negative for itching and rash.   Musculoskeletal: Positive for joint pain. Negative for back pain, muscle weakness and myalgias.   Gastrointestinal: Negative for abdominal pain, change in bowel habit and nausea.   Genitourinary: Negative for dysuria, flank pain, frequency and hesitancy.   Neurological: Negative for dizziness and tremors.   Psychiatric/Behavioral: Negative for altered mental status. The patient does not have insomnia.        /60   Pulse 53   Ht 162.6 cm (64\")   Wt 75.3 kg (166 lb)   BMI 28.49 kg/m² .    Orthostatic sitting 118/60  54  Orthostatic standing 100/58    55    Objective     Physical Exam   Constitutional: She is oriented to person, place, and time. She appears well-developed and well-nourished. No distress.   HENT:   Head: Normocephalic and atraumatic.   Eyes: Pupils are equal, round, and reactive to light.   Neck: Normal range of motion. Neck supple. No JVD present.   Cardiovascular: Regular rhythm, S1 normal, S2 normal, normal heart sounds and intact " distal pulses. Bradycardia present.   No murmur heard.  Pulmonary/Chest: Effort normal and breath sounds normal.   Abdominal: Soft. Normal appearance. She exhibits no distension. There is no tenderness.   Musculoskeletal: Normal range of motion. She exhibits no edema.   Neurological: She is alert and oriented to person, place, and time.   Skin: Skin is warm and dry.   Psychiatric: She has a normal mood and affect.           ECG 12 Lead  Date/Time: 10/15/2019 1:55 PM  Performed by: Krys Azar APRN  Authorized by: Krys Azar APRN   Comparison: not compared with previous ECG   Previous ECG: no previous ECG available  Rhythm: sinus bradycardia  Rate: bradycardic  BPM: 53  ST Segments: ST segments normal  QRS axis: normal  Other: no other findings    Clinical impression: abnormal EKG                    Diagnoses and all orders for this visit:    1. Dizziness (Primary)  Comments:  Reports episode of dizziness regardless of position.  Orders:  -     Adult Transthoracic Echo Complete W/ Cont if Necessary Per Protocol; Future  -     Holter Monitor - 24 Hour; Future    2. Near syncope  Comments:  Reports brief blackout episodes with no fall or true loss of consciousness  Orders:  -     Adult Transthoracic Echo Complete W/ Cont if Necessary Per Protocol; Future    3. Dyspnea on exertion  Comments:  Reports worsening episodes of dyspnea primarily with exertion but also at rest  Orders:  -     Adult Transthoracic Echo Complete W/ Cont if Necessary Per Protocol; Future    4. Palpitations  Comments:  Complains of periods of time where her heart feels like it beating hard.  Orders:  -     ECG 12 Lead  -     Basic Metabolic Panel; Future  -     Magnesium; Future  -     CBC & Differential; Future  -     Holter Monitor - 24 Hour; Future    5. Bradycardia, sinus  Comments:  Resting heart rate in the 50s    Other orders  -     ECG 12 Lead        Plan:  Would recommend checking baseline BMP magnesium and CBC today.  She had  a recent TSH that was normal  Echocardiogram to assess LV function and rule out structural heart disease with complaints of dyspnea on exertion and near syncope.  24-hour Holter monitor to assess palpitations and overall heart rate.  Advised patient to keep a blood pressure log.  Encouraged her to reduce caffeine intake  Encouraged her to stay well-hydrated with water.  Schedule follow-up for return in approximately 4 weeks to review test to make further recommendations advised to bring her blood pressure log back with her when she comes         Next follow-up appointment:

## 2019-10-15 NOTE — PROGRESS NOTES
Physical Therapy Initial Evaluation and Plan of Care    Patient: Alyson Lew   : 1985  Diagnosis/ICD-10 Code:  Right knee pain, unspecified chronicity [M25.561]  Referring practitioner: DANUTA Rojo  Date of Initial Visit: 10/15/2019  Today's Date: 10/15/2019  Patient seen for 1 session           Subjective Questionnaire: LEFS: 20 points    Subjective Evaluation    History of Present Illness  Mechanism of injury: Patient presents to physical therapy with orders to address R knee pain and L hip pain.  She states that she slipped on stairs on  and has since seen Dr. Rizvi.  He injected her knee and that has helped.   She states that her hip has bothered her for a while.  She states that her hip gets 'stuck' and she sometimes needs assistance to move.   She states that this morning she couldn't move her left leg without assistance because it feels stuck.       She states that she has had x-rays and was told that she has arthritis in her hip and in the past was told that it didn't 'sit right' in the socket.  She is scheduled to return to Dr. Rizvi in 2 weeks.  She states that she sees Dr. Rivero for pain management.  It was suggested that she get an injection in her hip but insurance has denied until she goes to physical therapy.       She is currently on a 24 hr Holter monitor to evaluate cardiac symptoms.  She just came straight from that appointment with the cardiologist.        Patient Occupation: Unable to work due to chronic pain Pain  Current pain rating: 3 (R knee 3/10, L hip 8/10)  At best pain rating: 3 (R knee 3/10, L hip 5/10)  At worst pain rating: 10 (R knee 8/10, L hip 10+/10)  Location: R knee and L hip  Relieving factors: rest and support (Ice and heat don't help)  Aggravating factors: movement, stairs, ambulation, squatting and prolonged positioning    Social Support  Lives in: one-story house  Lives with: spouse, young children and parents (3 kids)    Diagnostic  Tests  Abnormal x-ray: See MD chart.    Treatments  Previous treatment: injection treatment and medication (Injection in R knee)  Patient Goals  Patient goals for therapy: decreased pain       Objective       Static Posture   General Observations  Left leg length discrepancy.     Pelvis   Anterior pelvic tilt  Pelvis (Left): Obliquity.     Active Range of Motion     Lumbar   Flexion: 40 degrees   Extension: 15 degrees   Left lateral flexion: 20 degrees   Right lateral flexion: 20 degrees   Left Knee   Flexion: 137 degrees   Extension: 5 degrees     Right Knee   Flexion: 135 degrees   Extension: 0 degrees     Passive Range of Motion   Left Hip   Flexion: 90 degrees   Extension: 0 degrees   External rotation (90/90): 40 degrees   Internal rotation (90/90): 25 degrees     Strength/Myotome Testing     Left Hip   Planes of Motion   Flexion: 4  Extension: 3+  Abduction: 3+    Right Hip   Planes of Motion   Flexion: 4  Extension: 4  Abduction: 4    Left Knee   Flexion: 4  Extension: 4    Right Knee   Flexion: 3+  Extension: 3+    Left Ankle/Foot   Dorsiflexion: 4    Right Ankle/Foot   Dorsiflexion: 4    Additional Strength Details  Patient had increased complaints of knee and hip pain throughout all testing.    Tests     Additional Tests Details  +Stork on L  ? Eliza's + on R (difficult to accurately assess due to tenderness and pain       Assessment & Plan     Assessment  Impairments: abnormal gait, abnormal or restricted ROM, activity intolerance, impaired balance, impaired physical strength, lacks appropriate home exercise program, pain with function and weight-bearing intolerance  Assessment details: The patient is a 34 y.o. female who presents to physical therapy today for L hip and R knee pain. Upon initial evaluation, the patient demonstrates the impairments listed above. Due to these impairments, the patient is unable to roll over in bed independently when she is in pain. The patient would benefit from skilled  PT services to address functional limitations and impairments and to improve patient quality of life.    Prognosis: fair  Functional Limitations: sleeping, walking, uncomfortable because of pain, moving in bed, standing and unable to perform repetitive tasks  Goals  Plan Goals: STG's: 3 weeks   Patient will report pain level at worse </= 6/10 for improved tolerance to ADLs and functional mobility  Patient will require <3 tactile or verbal cues for proper mechanics during completion of her HEP      LTG's: By Discharge  Patient will report pain levels at worst </= 4/10 for improved tolerance to ADLs and functional mobility  Patient will be able to ambulate unlimited distances without an assistive device and no increased pain  Patient will be able to complete single leg stance on stable surface with no UE support, with supervision for durations > 15 seconds on B LE to decrease risk of falls  Patient will report >50% improvement in her ability to tolerate sitting for durations > 60 minutes per reduction in her symptoms   Patient will report improved levels of overall function as demonstrated by an increase in her reported level of function score on the LEFS to >/= 50/80    Patient will report improvement in their tolerance to sleeping and report waking up </= 1x/night per positional discomfort  Patient will require no tactile or verbal cues for proper mechaics during completion of her HEP for final independence       Plan  Therapy options: will be seen for skilled physical therapy services  Planned modality interventions: cryotherapy, electrical stimulation/Russian stimulation, TENS and thermotherapy (hydrocollator packs)  Other planned modality interventions: Dry Needling  Planned therapy interventions: abdominal trunk stabilization, manual therapy, balance/weight-bearing training, neuromuscular re-education, postural training, soft tissue mobilization, flexibility, functional ROM exercises, gait training and home  exercise program  Duration in visits: 12  Treatment plan discussed with: patient (Spouse)        History # of Personal Factors and/or Comorbidities: MODERATE (1-2)  Examination of Body System(s): # of elements: MODERATE (3)  Clinical Presentation: EVOLVING  Clinical Decision Making: MODERATE      Timed:         Manual Therapy:         mins  10863;     Therapeutic Exercise:    8     mins  89096;     Neuromuscular Aubrey:        mins  41190;    Therapeutic Activity:          mins  32675;     Gait Training:           mins  86054;     Ultrasound:          mins  19825;    Ionto                                   mins   79834  Self Care                            mins   74986  Canalith Repos         mins 23357      Un-Timed:  Electrical Stimulation:         mins  53562 ( );  Dry Needling          mins self-pay  Traction          mins 27513  Low Eval          Mins  85029  Mod Eval     40     Mins  95199  High Eval                            Mins  14929  Re-Eval                               mins  48857        Timed Treatment:   8   mins   Total Treatment:     48   mins    PT SIGNATURE: Mary Rosales, VIC   DATE TREATMENT INITIATED: 10/15/2019    Initial Certification  Certification Period: 1/13/2020  I certify that the therapy services are furnished while this patient is under my care.  The services outlined above are required by this patient, and will be reviewed every 90 days.     PHYSICIAN: Mine Thompson, APRMELONIE      DATE:     Please sign and return via fax to [phone].. Thank you, [facility].

## 2019-10-16 NOTE — PROGRESS NOTES
Please call patient and let her know her electrolytes and magnesium are normal    She is still anemic.     Hgb is 10.3.    Should f/u with PCP regarding this at some point.     Will await other testing

## 2019-10-22 ENCOUNTER — APPOINTMENT (OUTPATIENT)
Dept: PAIN MEDICINE | Facility: HOSPITAL | Age: 34
End: 2019-10-22

## 2019-10-22 ENCOUNTER — TREATMENT (OUTPATIENT)
Dept: PHYSICAL THERAPY | Facility: CLINIC | Age: 34
End: 2019-10-22

## 2019-10-22 DIAGNOSIS — M25.561 RIGHT KNEE PAIN, UNSPECIFIED CHRONICITY: Primary | ICD-10-CM

## 2019-10-22 DIAGNOSIS — M25.552 PAIN OF LEFT HIP JOINT: ICD-10-CM

## 2019-10-22 DIAGNOSIS — M54.50 CHRONIC LOW BACK PAIN, UNSPECIFIED BACK PAIN LATERALITY, UNSPECIFIED WHETHER SCIATICA PRESENT: ICD-10-CM

## 2019-10-22 DIAGNOSIS — G89.29 CHRONIC LOW BACK PAIN, UNSPECIFIED BACK PAIN LATERALITY, UNSPECIFIED WHETHER SCIATICA PRESENT: ICD-10-CM

## 2019-10-22 PROCEDURE — 97110 THERAPEUTIC EXERCISES: CPT | Performed by: PHYSICAL THERAPIST

## 2019-10-22 PROCEDURE — 97140 MANUAL THERAPY 1/> REGIONS: CPT | Performed by: PHYSICAL THERAPIST

## 2019-10-22 PROCEDURE — 97014 ELECTRIC STIMULATION THERAPY: CPT | Performed by: PHYSICAL THERAPIST

## 2019-10-22 NOTE — PROGRESS NOTES
Physical Therapy Daily Progress Note    VISIT#: 2    Subjective   Alyson Lew reports that she is really hurting today.   She states that she got stuck on her back this morning and her fiance had to help her get up.   She states that her hip locked up again.  She is scheduled to return to Dr. Rizvi on Tuesday.  She states that her knee feels a lot better since she had her injection in the R knee but insurance denied the shot in her L hip.      Pain Rating (0-10): 8-9/10    Objective     See Exercise, Manual, and Modality Logs for complete treatment.     Patient Education: Continue current HEP    Assessment & Plan     Assessment  Assessment details: Patient had some difficulty with bed mobility and transitioning from the treatment table.   She responded well to addition of manual therapy and modalities for pain reduction.        Progress per Plan of Care and Progress strengthening /stabilization /functional activity          Timed:         Manual Therapy:    15     mins  59272;     Therapeutic Exercise:    30     mins  08828;     Neuromuscular Aubrey:        mins  89115;    Therapeutic Activity:          mins  90836;     Gait Training:           mins  85574;     Ultrasound:          mins  75405;    Ionto                                   mins   64649  Self Care                            mins   46050  Canalith Repos                   mins  66004    Un-Timed:  Electrical Stimulation:    15     mins  19710 ( );  Dry Needling          mins self-pay  Traction          mins 71731  Low Eval          Mins  46091  Mod Eval          Mins  59951  High Eval                            Mins  61654  Re-Eval                               mins  28031    Timed Treatment:   45   mins   Total Treatment:     60   mins    Mary Rosales PT  [place] License # [id number]  Physical Therapist

## 2019-10-24 ENCOUNTER — HOSPITAL ENCOUNTER (EMERGENCY)
Facility: HOSPITAL | Age: 34
Discharge: HOME | End: 2019-10-25
Admitting: EMERGENCY MEDICINE

## 2019-10-24 VITALS
HEART RATE: 62 BPM | RESPIRATION RATE: 16 BRPM | BODY MASS INDEX: 28.45 KG/M2 | WEIGHT: 166.67 LBS | DIASTOLIC BLOOD PRESSURE: 62 MMHG | SYSTOLIC BLOOD PRESSURE: 99 MMHG | HEIGHT: 64 IN | OXYGEN SATURATION: 100 % | TEMPERATURE: 98.7 F

## 2019-10-24 DIAGNOSIS — S62.639A CLOSED FRACTURE OF TUFT OF DISTAL PHALANX OF FINGER: Primary | ICD-10-CM

## 2019-10-24 PROCEDURE — 99283 EMERGENCY DEPT VISIT LOW MDM: CPT

## 2019-10-24 RX ORDER — IBUPROFEN 400 MG/1
800 TABLET ORAL ONCE
Status: COMPLETED | OUTPATIENT
Start: 2019-10-24 | End: 2019-10-24

## 2019-10-24 RX ADMIN — IBUPROFEN 800 MG: 400 TABLET ORAL at 23:41

## 2019-10-25 ENCOUNTER — APPOINTMENT (OUTPATIENT)
Dept: GENERAL RADIOLOGY | Facility: HOSPITAL | Age: 34
End: 2019-10-25

## 2019-10-25 PROCEDURE — 73140 X-RAY EXAM OF FINGER(S): CPT

## 2019-10-25 NOTE — DISCHARGE INSTRUCTIONS
Wear splint until pain improves.  May use Tylenol or ibuprofen as needed for pain.  Elevate right hand.  Ice.  Follow-up with hand center if symptoms persist.  Return for new or worsening symptoms.

## 2019-10-25 NOTE — ED NOTES
Pt reports smashing her finger in door at [facility]. Small bruise noted to digit.      Li Ricketts, RN  10/24/19 6591

## 2019-10-25 NOTE — ED PROVIDER NOTES
Subjective   Patient is a 34-year-old white female presents today with complaints of injury to her right middle finger.  Patient states she smashed her finger and some glass doors today.  She complains of pain and bruising to the distal right middle finger.  Denies any numbness tingling or weakness.  Denies any injury to the nail.            Review of Systems   Musculoskeletal:        Right middle finger pain       Past Medical History:   Diagnosis Date   • Allergic    • Anemia    • Arthritis    • Asthma    • Cancer (CMS/HCC)    • Chronic constipation    • Chronic pain disorder    • Depression    • Extremity pain    • Fibromyalgia, primary    • Headache    • Injury of back    • Joint pain    • Low back pain    • Neck pain    • Peripheral neuropathy    • PTSD (post-traumatic stress disorder)    • Shortness of breath    • Stroke (CMS/HCC)     Mini heat stroke       Allergies   Allergen Reactions   • Adhesive Tape Hives   • Latex Hives       Past Surgical History:   Procedure Laterality Date   •  SECTION      x2   • CHOLECYSTECTOMY     • ENDOSCOPY     • GASTRIC SLEEVE LAPAROSCOPIC     • HAND SURGERY         Family History   Problem Relation Age of Onset   • No Known Problems Mother    • Cancer Father    • Coronary artery disease Father    • Diabetes Father    • Stroke Father    • Heart attack Father    • Mental illness Brother    • Mental illness Son        Social History     Socioeconomic History   • Marital status:      Spouse name: Not on file   • Number of children: Not on file   • Years of education: Not on file   • Highest education level: Not on file   Tobacco Use   • Smoking status: Current Some Day Smoker     Packs/day: 0.10     Types: Cigarettes     Start date: 2019   • Smokeless tobacco: Never Used   Substance and Sexual Activity   • Alcohol use: No     Frequency: Never   • Drug use: No           Objective   Physical Exam   Constitutional: She appears well-developed.   Vital signs and  triage nurse note reviewed.  Constitutional: Awake, alert; well-developed and well-nourished. No acute distress is noted.  Cardiovascular: Regular rate and rhythm  Pulmonary: Respiratory effort regular nonlabored  Musculoskeletal: Independent range of motion of all extremities.  Tenderness in mild bruising noted to the radial aspect of the right distal middle finger.  There is no nail involvement.  Good cap refill and sensation distally.  Neuro: Alert oriented x3, speech is clear and appropriate, GCS 15.    Skin: Flesh tone, warm, dry, intact; no erythematous or petechial rash or lesion.        Procedures           ED Course                  MDM  Number of Diagnoses or Management Options  Closed fracture of tuft of distal phalanx of finger:      Amount and/or Complexity of Data Reviewed  Tests in the radiology section of CPT®: reviewed and ordered    Risk of Complications, Morbidity, and/or Mortality  General comments: Patient had x-rays obtained.  She is given ibuprofen for pain.    Patient had a limited finger splint applied.    Diagnosis and treatment plan discussed with patient.  Patient agreeable to plan.   I discussed findings with patient who voices understanding of discharge instructions, signs and symptoms requiring return to ED; discharged improved and in stable condition with follow up for re-evaluation.      Patient Progress  Patient progress: stable      Final diagnoses:   Closed fracture of tuft of distal phalanx of finger              Georgina Goldberg, DANUTA  10/25/19 0054

## 2019-10-29 ENCOUNTER — TREATMENT (OUTPATIENT)
Dept: PHYSICAL THERAPY | Facility: CLINIC | Age: 34
End: 2019-10-29

## 2019-10-29 ENCOUNTER — HOSPITAL ENCOUNTER (OUTPATIENT)
Dept: CARDIOLOGY | Facility: HOSPITAL | Age: 34
Setting detail: HOSPITAL OUTPATIENT SURGERY
Discharge: HOME | End: 2019-10-29
Admitting: NURSE PRACTITIONER

## 2019-10-29 VITALS
HEIGHT: 63 IN | SYSTOLIC BLOOD PRESSURE: 104 MMHG | DIASTOLIC BLOOD PRESSURE: 69 MMHG | WEIGHT: 168 LBS | BODY MASS INDEX: 29.77 KG/M2

## 2019-10-29 DIAGNOSIS — M25.552 PAIN OF LEFT HIP JOINT: Primary | ICD-10-CM

## 2019-10-29 DIAGNOSIS — M25.561 RIGHT KNEE PAIN, UNSPECIFIED CHRONICITY: ICD-10-CM

## 2019-10-29 DIAGNOSIS — R06.09 DYSPNEA ON EXERTION: ICD-10-CM

## 2019-10-29 DIAGNOSIS — R55 NEAR SYNCOPE: ICD-10-CM

## 2019-10-29 DIAGNOSIS — M54.50 CHRONIC LOW BACK PAIN, UNSPECIFIED BACK PAIN LATERALITY, UNSPECIFIED WHETHER SCIATICA PRESENT: ICD-10-CM

## 2019-10-29 DIAGNOSIS — G89.29 CHRONIC LOW BACK PAIN, UNSPECIFIED BACK PAIN LATERALITY, UNSPECIFIED WHETHER SCIATICA PRESENT: ICD-10-CM

## 2019-10-29 DIAGNOSIS — R42 DIZZINESS: ICD-10-CM

## 2019-10-29 LAB
BH CV ECHO MEAS - ACS: 2 CM
BH CV ECHO MEAS - AO MAX PG (FULL): 2.8 MMHG
BH CV ECHO MEAS - AO MAX PG: 6.2 MMHG
BH CV ECHO MEAS - AO MEAN PG (FULL): 1.7 MMHG
BH CV ECHO MEAS - AO MEAN PG: 3.3 MMHG
BH CV ECHO MEAS - AO ROOT AREA (BSA CORRECTED): 1.8
BH CV ECHO MEAS - AO ROOT AREA: 8.6 CM^2
BH CV ECHO MEAS - AO ROOT DIAM: 3.3 CM
BH CV ECHO MEAS - AO V2 MAX: 124.9 CM/SEC
BH CV ECHO MEAS - AO V2 MEAN: 85.6 CM/SEC
BH CV ECHO MEAS - AO V2 VTI: 30 CM
BH CV ECHO MEAS - AVA(I,A): 2.4 CM^2
BH CV ECHO MEAS - AVA(I,D): 2.4 CM^2
BH CV ECHO MEAS - AVA(V,A): 2.4 CM^2
BH CV ECHO MEAS - AVA(V,D): 2.4 CM^2
BH CV ECHO MEAS - BSA(HAYCOCK): 1.9 M^2
BH CV ECHO MEAS - BSA: 1.8 M^2
BH CV ECHO MEAS - BZI_BMI: 29.8 KILOGRAMS/M^2
BH CV ECHO MEAS - BZI_METRIC_HEIGHT: 160 CM
BH CV ECHO MEAS - BZI_METRIC_WEIGHT: 76.2 KG
BH CV ECHO MEAS - EDV(CUBED): 114 ML
BH CV ECHO MEAS - EDV(MOD-SP4): 62.8 ML
BH CV ECHO MEAS - EDV(TEICH): 110.1 ML
BH CV ECHO MEAS - EF(CUBED): 58.3 %
BH CV ECHO MEAS - EF(MOD-BP): 66 %
BH CV ECHO MEAS - EF(MOD-SP4): 65.6 %
BH CV ECHO MEAS - EF(TEICH): 49.8 %
BH CV ECHO MEAS - ESV(CUBED): 47.6 ML
BH CV ECHO MEAS - ESV(MOD-SP4): 21.6 ML
BH CV ECHO MEAS - ESV(TEICH): 55.3 ML
BH CV ECHO MEAS - FS: 25.3 %
BH CV ECHO MEAS - IVS/LVPW: 1
BH CV ECHO MEAS - IVSD: 0.68 CM
BH CV ECHO MEAS - LA DIMENSION(2D): 3.1 CM
BH CV ECHO MEAS - LV DIASTOLIC VOL/BSA (35-75): 35 ML/M^2
BH CV ECHO MEAS - LV MASS(C)D: 101.2 GRAMS
BH CV ECHO MEAS - LV MASS(C)DI: 56.4 GRAMS/M^2
BH CV ECHO MEAS - LV MAX PG: 3.4 MMHG
BH CV ECHO MEAS - LV MEAN PG: 1.6 MMHG
BH CV ECHO MEAS - LV SYSTOLIC VOL/BSA (12-30): 12 ML/M^2
BH CV ECHO MEAS - LV V1 MAX: 92.6 CM/SEC
BH CV ECHO MEAS - LV V1 MEAN: 60.3 CM/SEC
BH CV ECHO MEAS - LV V1 VTI: 22.3 CM
BH CV ECHO MEAS - LVIDD: 4.8 CM
BH CV ECHO MEAS - LVIDS: 3.6 CM
BH CV ECHO MEAS - LVOT AREA: 3.2 CM^2
BH CV ECHO MEAS - LVOT DIAM: 2 CM
BH CV ECHO MEAS - LVPWD: 0.65 CM
BH CV ECHO MEAS - MR MAX PG: 46.4 MMHG
BH CV ECHO MEAS - MR MAX VEL: 340.6 CM/SEC
BH CV ECHO MEAS - MV A MAX VEL: 44.3 CM/SEC
BH CV ECHO MEAS - MV DEC SLOPE: 432.6 CM/SEC^2
BH CV ECHO MEAS - MV DEC TIME: 0.22 SEC
BH CV ECHO MEAS - MV E MAX VEL: 96.1 CM/SEC
BH CV ECHO MEAS - MV E/A: 2.2
BH CV ECHO MEAS - MV MAX PG: 4.5 MMHG
BH CV ECHO MEAS - MV MEAN PG: 0.95 MMHG
BH CV ECHO MEAS - MV V2 MAX: 106.3 CM/SEC
BH CV ECHO MEAS - MV V2 MEAN: 40.1 CM/SEC
BH CV ECHO MEAS - MV V2 VTI: 33.8 CM
BH CV ECHO MEAS - MVA(VTI): 2.1 CM^2
BH CV ECHO MEAS - PA MAX PG (FULL): 1.1 MMHG
BH CV ECHO MEAS - PA MAX PG: 3.4 MMHG
BH CV ECHO MEAS - PA V2 MAX: 91.9 CM/SEC
BH CV ECHO MEAS - RV MAX PG: 2.3 MMHG
BH CV ECHO MEAS - RV MEAN PG: 1.6 MMHG
BH CV ECHO MEAS - RV V1 MAX: 75.9 CM/SEC
BH CV ECHO MEAS - RV V1 MEAN: 61.9 CM/SEC
BH CV ECHO MEAS - RV V1 VTI: 28.6 CM
BH CV ECHO MEAS - RVDD: 2.6 CM
BH CV ECHO MEAS - SI(AO): 144.3 ML/M^2
BH CV ECHO MEAS - SI(CUBED): 37 ML/M^2
BH CV ECHO MEAS - SI(LVOT): 39.6 ML/M^2
BH CV ECHO MEAS - SI(MOD-SP4): 23 ML/M^2
BH CV ECHO MEAS - SI(TEICH): 30.5 ML/M^2
BH CV ECHO MEAS - SV(AO): 259.1 ML
BH CV ECHO MEAS - SV(CUBED): 66.4 ML
BH CV ECHO MEAS - SV(LVOT): 71.1 ML
BH CV ECHO MEAS - SV(MOD-SP4): 41.2 ML
BH CV ECHO MEAS - SV(TEICH): 54.8 ML

## 2019-10-29 PROCEDURE — 93306 TTE W/DOPPLER COMPLETE: CPT

## 2019-10-29 PROCEDURE — 97140 MANUAL THERAPY 1/> REGIONS: CPT | Performed by: PHYSICAL THERAPIST

## 2019-10-29 PROCEDURE — 93306 TTE W/DOPPLER COMPLETE: CPT | Performed by: INTERNAL MEDICINE

## 2019-10-29 PROCEDURE — 97014 ELECTRIC STIMULATION THERAPY: CPT | Performed by: PHYSICAL THERAPIST

## 2019-10-29 PROCEDURE — 97110 THERAPEUTIC EXERCISES: CPT | Performed by: PHYSICAL THERAPIST

## 2019-10-29 NOTE — PROGRESS NOTES
Physical Therapy Daily Progress Note    VISIT#: 3    Subjective   Alyson Lew reports that her hip got really flared up last night.   She states that she also fractured her middle finger last week.       Pain Rating (0-10): 7    Objective     See Exercise, Manual, and Modality Logs for complete treatment.     Patient Education: Continue gentle stretching and home program    Assessment & Plan     Assessment  Assessment details: Patient required therapist assist with transition from supine to sidelying but could transfer from sidelying to sit independently.  She still has moderate daily pain.          Progress per Plan of Care and Progress strengthening /stabilization /functional activity            Timed:         Manual Therapy:    15     mins  39204;     Therapeutic Exercise:    25     mins  18490;     Neuromuscular Aubrey:        mins  54790;    Therapeutic Activity:          mins  61324;     Gait Training:           mins  17555;     Ultrasound:          mins  63107;    Ionto                                   mins   87456  Self Care                            mins   28725  Canalith Repos                   mins  88294    Un-Timed:  Electrical Stimulation:    15     mins  80339 ( );  Dry Needling          mins self-pay  Traction          mins 30070  Low Eval          Mins  89174  Mod Eval          Mins  30998  High Eval                            Mins  55589  Re-Eval                               mins  58882    Timed Treatment:   40   mins   Total Treatment:     55   mins    Mary Rosales PT  [place] License # [id number]  Physical Therapist

## 2019-10-31 ENCOUNTER — OFFICE VISIT (OUTPATIENT)
Dept: CARDIOLOGY | Facility: CLINIC | Age: 34
End: 2019-10-31

## 2019-10-31 VITALS
BODY MASS INDEX: 29.59 KG/M2 | SYSTOLIC BLOOD PRESSURE: 94 MMHG | HEIGHT: 63 IN | HEART RATE: 61 BPM | DIASTOLIC BLOOD PRESSURE: 62 MMHG | WEIGHT: 167 LBS

## 2019-10-31 DIAGNOSIS — R55 NEAR SYNCOPE: ICD-10-CM

## 2019-10-31 DIAGNOSIS — I47.29 VENTRICULAR TACHYCARDIA (PAROXYSMAL) (HCC): Primary | ICD-10-CM

## 2019-10-31 DIAGNOSIS — R00.2 PALPITATIONS: ICD-10-CM

## 2019-10-31 DIAGNOSIS — R07.2 PRECORDIAL PAIN: ICD-10-CM

## 2019-10-31 DIAGNOSIS — R06.02 SOB (SHORTNESS OF BREATH): ICD-10-CM

## 2019-10-31 PROBLEM — I47.20 VENTRICULAR TACHYCARDIA (PAROXYSMAL): Status: ACTIVE | Noted: 2019-10-31

## 2019-10-31 PROCEDURE — 99204 OFFICE O/P NEW MOD 45 MIN: CPT | Performed by: INTERNAL MEDICINE

## 2019-10-31 RX ORDER — NAPROXEN 500 MG/1
TABLET ORAL
Refills: 0 | COMMUNITY
Start: 2019-10-08 | End: 2021-12-23

## 2019-10-31 NOTE — PROGRESS NOTES
Date of Office Visit: 10/31/2019  Encounter Provider: Dr. Leo Fagan  Place of Service: [facility] [place]  Patient Name: Alyson Lew  :1985  Michelle Narayanan MD    Chief Complaint   Patient presents with   • Palpitations  Wide complex tachycardia  Nonsustained ventricular tachycardia     2 week follow up holter/echo   • Syncope   • Shortness of Breath     History of Present Illness:    I am pleased to see Mrs. Lew in my office today as a follow-up.    As you know, patient is 34 years old white female whose past medical history is significant for hypothyroidism, asthma, chronic pain syndrome, fibromyalgia, on chronic morphine therapy, who is referred to me for symptom of dizziness and lightheadedness and palpitation.  She was initially seen by DANUTA Marie in our office.    Patient reports that from last 1 year she is having episode of palpitation described as a racing of the heart.  These episode last for few seconds.  It is associated with dizziness and lightheadedness and also chest discomfort.  Patient also complained of shortness of breath.  Initially these episodes were infrequent.  However patient reports that from last few months these episodes are getting more often and lasting a little bit longer.  Patient also had near syncopal episode few weeks back.  Patient also complained of dizziness and lightheadedness on standing up and change in postures.  Patient denies orthopnea, PND, syncope or presyncope.  No exertional component of chest pain.    Patient does not have previous history of CAD, PCI or MI.  No congenital heart disease.  Patient does not smoke or abuse alcohol.  Patient does have history of relative bradycardia in her family.    In 2019, patient underwent Holter monitor which showed wide complex tachycardia consistent with nonsustained ventricular tachyarrhythmia.  Echocardiogram showed LV function was 60 to 65% and no significant  valvular heart disease noted.    Patient came today for further recommendation.  Patient is having nonsustained ventricular tachycardia.  Etiology is unclear to me.  I would recommend that patient should proceed with cardiac catheterization.  I will refer the patient to EP for possible EP study and subsequent ablation.  Patient may need cardiac MRI.    Past Medical History:   Diagnosis Date   • Allergic    • Anemia    • Arthritis    • Asthma    • Cancer (CMS/HCC)    • Chronic constipation    • Chronic pain disorder    • Depression    • Extremity pain    • Fibromyalgia, primary    • Headache    • Injury of back    • Joint pain    • Low back pain    • Neck pain    • Peripheral neuropathy    • PTSD (post-traumatic stress disorder)    • Shortness of breath    • Stroke (CMS/HCC)     Mini heat stroke         Past Surgical History:   Procedure Laterality Date   •  SECTION      x2   • CHOLECYSTECTOMY     • ENDOSCOPY     • GASTRIC SLEEVE LAPAROSCOPIC     • HAND SURGERY             Current Outpatient Medications:   •  albuterol sulfate HFA (PROAIR HFA) 108 (90 Base) MCG/ACT inhaler, Inhale 2 puffs Every 4 (Four) Hours As Needed for Wheezing., Disp: 1 inhaler, Rfl: 1  •  diclofenac (FLECTOR) 1.3 % patch patch, Apply 1 patch topically to the appropriate area as directed 2 (Two) Times a Day As Needed (pain)., Disp: 60 patch, Rfl: 2  •  fluticasone (FLONASE) 50 MCG/ACT nasal spray, 2 sprays into the nostril(s) as directed by provider Daily., Disp: 1 bottle, Rfl: 2  •  levothyroxine (LEVO-T) 75 MCG tablet, Take 1 tablet by mouth Daily., Disp: 30 tablet, Rfl: 1  •  Lidocaine (ZTLIDO) 1.8 % patch, Apply 1 patch topically Every 12 (Twelve) Hours As Needed (pain). 12 hours on and 12 hours off, Disp: 30 patch, Rfl: 2  •  linaclotide (LINZESS) 145 MCG capsule capsule, Take 1 capsule by mouth Every Morning Before Breakfast., Disp: 30 capsule, Rfl:   •  LYRICA 200 MG capsule, Take 1 capsule by mouth 3 (Three) Times a Day., Disp:  90 capsule, Rfl: 2  •  Morphine (MSIR) 15 MG tablet, Take 15 mg by mouth Every 6 (Six) Hours As Needed for Severe Pain ., Disp: 120 tablet, Rfl: 0  •  naproxen (NAPROSYN) 500 MG tablet, TAKE ONE (1) TABLET BY MOUTH EVERY TWELVE HOURS WITH FOOD OR MILK AS NEEDED, Disp: , Rfl: 0  •  Pediatric Multiple Vit-C-FA (FLINSTONES GUMMIES OMEGA-3 DHA) chewable tablet, FLINSTONES GUMMIES OMEGA-3 DHA CHEW, Disp: , Rfl:   •  ranitidine (ZANTAC) 150 MG capsule, Take 1 capsule by mouth Every Evening., Disp: 90 capsule, Rfl: 1  •  traZODone (DESYREL) 150 MG tablet, Take 1 tablet by mouth Every Night., Disp: 30 tablet, Rfl: 0      Social History     Socioeconomic History   • Marital status:      Spouse name: Not on file   • Number of children: Not on file   • Years of education: Not on file   • Highest education level: Not on file   Tobacco Use   • Smoking status: Current Some Day Smoker     Packs/day: 0.10     Types: Cigarettes     Start date: 8/1/2019   • Smokeless tobacco: Never Used   Substance and Sexual Activity   • Alcohol use: No     Frequency: Never   • Drug use: No   • Sexual activity: Defer         Review of Systems   Constitution: Negative for chills and fever.   HENT: Negative for ear discharge and nosebleeds.    Eyes: Negative for discharge and redness.   Cardiovascular: Positive for palpitations and syncope. Negative for chest pain, orthopnea and paroxysmal nocturnal dyspnea.   Respiratory: Negative for cough, shortness of breath and wheezing.    Endocrine: Negative for heat intolerance.   Skin: Negative for rash.   Musculoskeletal: Negative for arthritis and myalgias.   Gastrointestinal: Negative for abdominal pain, melena, nausea and vomiting.   Genitourinary: Negative for dysuria and hematuria.   Neurological: Negative for dizziness, light-headedness, numbness and tremors.   Psychiatric/Behavioral: Negative for depression. The patient is not nervous/anxious.        Procedures    Procedures    ECG 12 Lead     "(Results Pending)           Objective:    BP 94/62   Pulse 61   Ht 160 cm (63\")   Wt 75.8 kg (167 lb)   LMP 10/10/2019   BMI 29.58 kg/m²         Physical Exam   Constitutional: She is oriented to person, place, and time. She appears well-developed and well-nourished.   HENT:   Head: Normocephalic and atraumatic.   Eyes: No scleral icterus.   Neck: No thyromegaly present.   Cardiovascular: Normal rate, regular rhythm and normal heart sounds. Exam reveals no gallop and no friction rub.   No murmur heard.  Pulmonary/Chest: Effort normal and breath sounds normal. No respiratory distress. She has no wheezes. She has no rales.   Abdominal: There is no tenderness.   Musculoskeletal: She exhibits no edema.   Lymphadenopathy:     She has no cervical adenopathy.   Neurological: She is alert and oriented to person, place, and time.   Skin: No rash noted. No erythema.   Psychiatric: She has a normal mood and affect.           Assessment:       Diagnosis Plan   1. Ventricular tachycardia (paroxysmal) (CMS/HCC)  Case Request Cath Lab: Left Heart Cath    CBC (No Diff)    Basic Metabolic Panel    Protime-INR    aPTT    ECG 12 Lead   2. Palpitations  Case Request Cath Lab: Left Heart Cath    CBC (No Diff)    Basic Metabolic Panel    Protime-INR    aPTT    ECG 12 Lead   3. Near syncope  Case Request Cath Lab: Left Heart Cath    CBC (No Diff)    Basic Metabolic Panel    Protime-INR    aPTT    ECG 12 Lead   4. SOB (shortness of breath)  Case Request Cath Lab: Left Heart Cath    CBC (No Diff)    Basic Metabolic Panel    Protime-INR    aPTT    ECG 12 Lead   5. Precordial pain  Case Request Cath Lab: Left Heart Cath    CBC (No Diff)    Basic Metabolic Panel    Protime-INR    aPTT    ECG 12 Lead            Plan:       I would recommend that patient should proceed with cardiac catheterization.  EP consultation will be requested.  Patient may need cardiac MRI  "

## 2019-11-05 ENCOUNTER — HOSPITAL ENCOUNTER (OUTPATIENT)
Facility: HOSPITAL | Age: 34
Setting detail: HOSPITAL OUTPATIENT SURGERY
Discharge: HOME | End: 2019-11-05
Attending: INTERNAL MEDICINE | Admitting: INTERNAL MEDICINE

## 2019-11-05 ENCOUNTER — LAB (OUTPATIENT)
Dept: LAB | Facility: HOSPITAL | Age: 34
End: 2019-11-05

## 2019-11-05 VITALS
HEIGHT: 65 IN | RESPIRATION RATE: 12 BRPM | OXYGEN SATURATION: 99 % | SYSTOLIC BLOOD PRESSURE: 94 MMHG | TEMPERATURE: 97.9 F | HEART RATE: 79 BPM | WEIGHT: 165.12 LBS | DIASTOLIC BLOOD PRESSURE: 48 MMHG | BODY MASS INDEX: 27.51 KG/M2

## 2019-11-05 DIAGNOSIS — I47.29 VENTRICULAR TACHYCARDIA (PAROXYSMAL) (HCC): ICD-10-CM

## 2019-11-05 DIAGNOSIS — R07.2 PRECORDIAL PAIN: ICD-10-CM

## 2019-11-05 DIAGNOSIS — R06.02 SOB (SHORTNESS OF BREATH): ICD-10-CM

## 2019-11-05 DIAGNOSIS — R00.2 PALPITATIONS: ICD-10-CM

## 2019-11-05 DIAGNOSIS — R55 NEAR SYNCOPE: ICD-10-CM

## 2019-11-05 LAB
ANION GAP SERPL CALCULATED.3IONS-SCNC: 10 MMOL/L (ref 5–15)
APTT PPP: 23.4 SECONDS (ref 24–31)
BUN BLD-MCNC: 15 MG/DL (ref 6–20)
BUN/CREAT SERPL: 22.1 (ref 7–25)
CALCIUM SPEC-SCNC: 9.4 MG/DL (ref 8.6–10.5)
CHLORIDE SERPL-SCNC: 103 MMOL/L (ref 98–107)
CO2 SERPL-SCNC: 27 MMOL/L (ref 22–29)
CREAT BLD-MCNC: 0.68 MG/DL (ref 0.57–1)
DEPRECATED RDW RBC AUTO: 45.9 FL (ref 37–54)
ERYTHROCYTE [DISTWIDTH] IN BLOOD BY AUTOMATED COUNT: 14.7 % (ref 12.3–15.4)
GFR SERPL CREATININE-BSD FRML MDRD: 100 ML/MIN/1.73
GLUCOSE BLD-MCNC: 89 MG/DL (ref 65–99)
HCT VFR BLD AUTO: 31.1 % (ref 34–46.6)
HGB BLD-MCNC: 10.5 G/DL (ref 12–15.9)
INR PPP: 1.08 (ref 0.9–1.1)
MCH RBC QN AUTO: 29.9 PG (ref 26.6–33)
MCHC RBC AUTO-ENTMCNC: 33.7 G/DL (ref 31.5–35.7)
MCV RBC AUTO: 88.6 FL (ref 79–97)
PLATELET # BLD AUTO: 255 10*3/MM3 (ref 140–450)
PMV BLD AUTO: 8 FL (ref 6–12)
POTASSIUM BLD-SCNC: 3.7 MMOL/L (ref 3.5–5.2)
PROTHROMBIN TIME: 11.2 SECONDS (ref 9.6–11.7)
RBC # BLD AUTO: 3.52 10*6/MM3 (ref 3.77–5.28)
SODIUM BLD-SCNC: 140 MMOL/L (ref 136–145)
WBC NRBC COR # BLD: 4.2 10*3/MM3 (ref 3.4–10.8)

## 2019-11-05 PROCEDURE — 93458 L HRT ARTERY/VENTRICLE ANGIO: CPT | Performed by: INTERNAL MEDICINE

## 2019-11-05 PROCEDURE — 25010000002 LORAZEPAM PER 2 MG: Performed by: INTERNAL MEDICINE

## 2019-11-05 PROCEDURE — C1769 GUIDE WIRE: HCPCS | Performed by: INTERNAL MEDICINE

## 2019-11-05 PROCEDURE — 99152 MOD SED SAME PHYS/QHP 5/>YRS: CPT | Performed by: INTERNAL MEDICINE

## 2019-11-05 PROCEDURE — 25010000002 MIDAZOLAM PER 1 MG: Performed by: INTERNAL MEDICINE

## 2019-11-05 PROCEDURE — 85730 THROMBOPLASTIN TIME PARTIAL: CPT

## 2019-11-05 PROCEDURE — 85027 COMPLETE CBC AUTOMATED: CPT

## 2019-11-05 PROCEDURE — 36415 COLL VENOUS BLD VENIPUNCTURE: CPT

## 2019-11-05 PROCEDURE — 80048 BASIC METABOLIC PNL TOTAL CA: CPT

## 2019-11-05 PROCEDURE — 0 IOPAMIDOL PER 1 ML: Performed by: INTERNAL MEDICINE

## 2019-11-05 PROCEDURE — C1894 INTRO/SHEATH, NON-LASER: HCPCS | Performed by: INTERNAL MEDICINE

## 2019-11-05 PROCEDURE — 25010000002 ONDANSETRON PER 1 MG: Performed by: INTERNAL MEDICINE

## 2019-11-05 PROCEDURE — 85610 PROTHROMBIN TIME: CPT

## 2019-11-05 PROCEDURE — 25010000002 FENTANYL CITRATE (PF) 100 MCG/2ML SOLUTION: Performed by: INTERNAL MEDICINE

## 2019-11-05 RX ORDER — ATROPINE SULFATE 1 MG/ML
.5-1 INJECTION, SOLUTION INTRAMUSCULAR; INTRAVENOUS; SUBCUTANEOUS
Status: DISCONTINUED | OUTPATIENT
Start: 2019-11-05 | End: 2019-11-05 | Stop reason: HOSPADM

## 2019-11-05 RX ORDER — LIDOCAINE HYDROCHLORIDE 20 MG/ML
INJECTION, SOLUTION INFILTRATION; PERINEURAL AS NEEDED
Status: DISCONTINUED | OUTPATIENT
Start: 2019-11-05 | End: 2019-11-05 | Stop reason: HOSPADM

## 2019-11-05 RX ORDER — SODIUM CHLORIDE 9 MG/ML
250 INJECTION, SOLUTION INTRAVENOUS ONCE AS NEEDED
Status: DISCONTINUED | OUTPATIENT
Start: 2019-11-05 | End: 2019-11-05 | Stop reason: HOSPADM

## 2019-11-05 RX ORDER — ONDANSETRON 4 MG/1
4 TABLET, FILM COATED ORAL EVERY 6 HOURS PRN
Status: DISCONTINUED | OUTPATIENT
Start: 2019-11-05 | End: 2019-11-05 | Stop reason: HOSPADM

## 2019-11-05 RX ORDER — MIDAZOLAM HYDROCHLORIDE 1 MG/ML
INJECTION INTRAMUSCULAR; INTRAVENOUS AS NEEDED
Status: DISCONTINUED | OUTPATIENT
Start: 2019-11-05 | End: 2019-11-05 | Stop reason: HOSPADM

## 2019-11-05 RX ORDER — ONDANSETRON 2 MG/ML
4 INJECTION INTRAMUSCULAR; INTRAVENOUS EVERY 6 HOURS PRN
Status: DISCONTINUED | OUTPATIENT
Start: 2019-11-05 | End: 2019-11-05 | Stop reason: HOSPADM

## 2019-11-05 RX ORDER — LORAZEPAM 2 MG/ML
1 INJECTION INTRAMUSCULAR ONCE
Status: COMPLETED | OUTPATIENT
Start: 2019-11-05 | End: 2019-11-05

## 2019-11-05 RX ORDER — SODIUM CHLORIDE 9 MG/ML
30 INJECTION, SOLUTION INTRAVENOUS CONTINUOUS
Status: DISCONTINUED | OUTPATIENT
Start: 2019-11-05 | End: 2019-11-05 | Stop reason: HOSPADM

## 2019-11-05 RX ORDER — FENTANYL CITRATE 50 UG/ML
INJECTION, SOLUTION INTRAMUSCULAR; INTRAVENOUS AS NEEDED
Status: DISCONTINUED | OUTPATIENT
Start: 2019-11-05 | End: 2019-11-05 | Stop reason: HOSPADM

## 2019-11-05 RX ADMIN — LORAZEPAM 1 MG: 2 INJECTION INTRAMUSCULAR; INTRAVENOUS at 11:08

## 2019-11-05 RX ADMIN — ONDANSETRON 4 MG: 2 INJECTION INTRAMUSCULAR; INTRAVENOUS at 18:39

## 2019-11-05 RX ADMIN — SODIUM CHLORIDE 30 ML/HR: 900 INJECTION, SOLUTION INTRAVENOUS at 11:07

## 2019-11-05 NOTE — DISCHARGE INSTRUCTIONS
Post Cath Instructions        1) Drink plenty of fluids for the next 24 hours.  This helps to eliminate the dye used in your procedure through urination.  You may resume a normal diet; however, try to avoid foods that would cause gas or constipation.    2) Sedative medication given to you during your catheterization may decrease your judgement and reaction time for up to 24-48 hours.  Therefore:  a. DO NOT drive or operate hazardous machinery (48 hours)  b. DO NOT consume alcoholic beverages  c. DO NOT make any important/legal decisions  d. Have someone stay with you for at least 24 hours    3) To allow proper healing and prevent bleeding, the following activities are to be strictly avoided for the next 24-48 hours:  a. Excessive bending at wound site  b. Straining (anything that would tense up muscles around the affected puncture site)  c. Lifting objects greater than 5 pounds, pushing, or pulling for 5 days  i. For Arm Cases:  1. No flexing at the puncture site, such as hammering, golfing, bowling, or swinging any objects  ii. For Groin Cases:  1. Refrain from sexual activity  2. Refrain from running or vigorous walking  3. No prolonged sitting or standing  4. Limit stair climbing as much as possible    4) Keep the puncture site clean and dry.  You may remove the dressing tomorrow and replace it with a band-aid for at least one additional day.  Gently clean the site with mild soap and water.  No scrubbing/rubbing and lightly pat the area dry.  Showers are acceptable; however, avoid submerging in water (tub baths, hot tubs, swimming pools, dishwater, etc…) for at least one week.  The site should be completely healed before resuming these activities to reduce the risk of infection.  Check the site often.  Watch for signs and symptoms of infection and notify your physician if any of the following occur:  a. Bleeding or an increase in swelling at the puncture site  b. Fever  c. Increased soreness around puncture  site  d. Foul odor or significant drainage from the puncture site  e. Swelling, redness, or warmth at the puncture site    **A bruise or small “pea sized” lump under the skin at the puncture site is not unusual.  This should disappear within 3-4 weeks.**  5) CONTACT YOUR PHYSICIAN OR CALL 911 IF YOU EXPERIENCE ANY OF THE FOLLOWING:  a. Increased angina (chest pain) or frequent sensations of pressure, burning, pain, or other discomfort in the chest, arm, jaws, or stomach  b. Lightheadedness, dizziness, faint feeling, sweating, or difficulty breathing  c. Odd sensation changes like numbness, tingling, coldness, or pain in the arm or leg in which the catheter was inserted  d. Limb in which the catheter was inserted becomes pale/bluish in color    IMPORTANT:  Although this occurs very rarely, if you should develop bright red or excessive bleeding, feel a “pop” inside at the insertion site, or notice a sudden increase in swelling larger than a walnut, you should call 911.  Hold continuous firm pressure to the access site until emergency personnel arrive.  It is best if someone else can do this for you.

## 2019-11-19 ENCOUNTER — TREATMENT (OUTPATIENT)
Dept: PHYSICAL THERAPY | Facility: CLINIC | Age: 34
End: 2019-11-19

## 2019-11-19 DIAGNOSIS — M25.552 PAIN OF LEFT HIP JOINT: Primary | ICD-10-CM

## 2019-11-19 DIAGNOSIS — M25.561 RIGHT KNEE PAIN, UNSPECIFIED CHRONICITY: ICD-10-CM

## 2019-11-19 DIAGNOSIS — M54.50 CHRONIC LOW BACK PAIN, UNSPECIFIED BACK PAIN LATERALITY, UNSPECIFIED WHETHER SCIATICA PRESENT: ICD-10-CM

## 2019-11-19 DIAGNOSIS — G89.29 CHRONIC LOW BACK PAIN, UNSPECIFIED BACK PAIN LATERALITY, UNSPECIFIED WHETHER SCIATICA PRESENT: ICD-10-CM

## 2019-11-19 PROCEDURE — 97140 MANUAL THERAPY 1/> REGIONS: CPT | Performed by: PHYSICAL THERAPIST

## 2019-11-19 PROCEDURE — 97014 ELECTRIC STIMULATION THERAPY: CPT | Performed by: PHYSICAL THERAPIST

## 2019-11-19 NOTE — PROGRESS NOTES
Physical Therapy Daily Progress Note    VISIT#: 4    Subjective   Alyson Lew reports that she has not been in to physical therapy in the past few weeks due to other medical problems and transportation issues.   She states that she had a heart cath and had no blockage but they still don't know the cause of her heart arrhythmias.   She states that her hip pain is 10/10 today and she barely made it to this appointment.  She is trying to reschedule an appointment with her physician regarding her hip but had to reschedule because of her heart cath.  Pain Rating (0-10): 10    Objective     See Exercise, Manual, and Modality Logs for complete treatment.     Patient Education: Continue current home stretching    Assessment & Plan     Assessment  Assessment details: Patient continues with moderate hip and back pain.  She had difficulty with positioning and transitioning to and from the treatment table.  She responds well to manual therapy and modalities but does not tolerate exercise well.         Progress per Plan of Care          Timed:         Manual Therapy:    25     mins  63546;     Therapeutic Exercise:    5     mins  69518;     Neuromuscular Aubrey:        mins  79665;    Therapeutic Activity:          mins  40771;     Gait Training:           mins  99043;     Ultrasound:          mins  46692;    Ionto                                   mins   16336  Self Care                            mins   88395  Canalith Repos                   mins  98395    Un-Timed:  Electrical Stimulation:    15     mins  07019 ( );  Dry Needling          mins self-pay  Traction          mins 38259  Low Eval          Mins  67411  Mod Eval          Mins  16109  High Eval                            Mins  60659  Re-Eval                               mins  09284    Timed Treatment:  30     mins   Total Treatment:     45   mins    Mary Rosales PT  [place] License # [id number]  Physical Therapist

## 2019-12-09 ENCOUNTER — TELEPHONE (OUTPATIENT)
Dept: BARIATRICS/WEIGHT MGMT | Facility: CLINIC | Age: 34
End: 2019-12-09

## 2019-12-17 ENCOUNTER — OFFICE VISIT (OUTPATIENT)
Dept: PAIN MEDICINE | Facility: CLINIC | Age: 34
End: 2019-12-17

## 2019-12-17 VITALS
HEIGHT: 63 IN | BODY MASS INDEX: 28.17 KG/M2 | TEMPERATURE: 98.6 F | WEIGHT: 159 LBS | SYSTOLIC BLOOD PRESSURE: 102 MMHG | OXYGEN SATURATION: 98 % | HEART RATE: 59 BPM | DIASTOLIC BLOOD PRESSURE: 67 MMHG | RESPIRATION RATE: 16 BRPM

## 2019-12-17 DIAGNOSIS — M54.50 CHRONIC MIDLINE LOW BACK PAIN, UNSPECIFIED WHETHER SCIATICA PRESENT: ICD-10-CM

## 2019-12-17 DIAGNOSIS — G89.29 CHRONIC RIGHT SHOULDER PAIN: ICD-10-CM

## 2019-12-17 DIAGNOSIS — M79.7 FIBROMYALGIA: Primary | ICD-10-CM

## 2019-12-17 DIAGNOSIS — G89.29 NECK PAIN, CHRONIC: ICD-10-CM

## 2019-12-17 DIAGNOSIS — M79.601 PAIN IN RIGHT ARM: ICD-10-CM

## 2019-12-17 DIAGNOSIS — G89.29 CHRONIC MIDLINE LOW BACK PAIN, UNSPECIFIED WHETHER SCIATICA PRESENT: ICD-10-CM

## 2019-12-17 DIAGNOSIS — M54.2 NECK PAIN, CHRONIC: ICD-10-CM

## 2019-12-17 DIAGNOSIS — R20.2 PARESTHESIA OF UPPER LIMB: ICD-10-CM

## 2019-12-17 DIAGNOSIS — M25.511 CHRONIC RIGHT SHOULDER PAIN: ICD-10-CM

## 2019-12-17 DIAGNOSIS — M46.1 SACROILIITIS, NOT ELSEWHERE CLASSIFIED (HCC): ICD-10-CM

## 2019-12-17 DIAGNOSIS — M25.50 PAIN IN JOINT INVOLVING MULTIPLE SITES: ICD-10-CM

## 2019-12-17 DIAGNOSIS — Z79.899 OTHER LONG TERM (CURRENT) DRUG THERAPY: ICD-10-CM

## 2019-12-17 PROCEDURE — G0463 HOSPITAL OUTPT CLINIC VISIT: HCPCS | Performed by: PHYSICAL MEDICINE & REHABILITATION

## 2019-12-17 PROCEDURE — 99214 OFFICE O/P EST MOD 30 MIN: CPT | Performed by: PHYSICAL MEDICINE & REHABILITATION

## 2019-12-17 RX ORDER — MORPHINE SULFATE 30 MG/1
30 TABLET, FILM COATED, EXTENDED RELEASE ORAL 2 TIMES DAILY
Qty: 60 TABLET | Refills: 0 | Status: SHIPPED | OUTPATIENT
Start: 2019-12-17 | End: 2020-03-17 | Stop reason: SDUPTHER

## 2019-12-17 RX ORDER — MORPHINE SULFATE 30 MG/1
30 TABLET, FILM COATED, EXTENDED RELEASE ORAL 2 TIMES DAILY
Qty: 60 TABLET | Refills: 0 | Status: SHIPPED | OUTPATIENT
Start: 2019-12-17 | End: 2019-12-17 | Stop reason: SDUPTHER

## 2019-12-17 NOTE — PROGRESS NOTES
"Subjective   Alyson Lew is a 34 y.o. female.     all over pain, especially back, neck and bilateral hip pain, right knee. Dz with fibromyalgia and inflammatory arthritis by Rheum, on DMARDs and Lyrica 100mg BID at initial visit with poor control, pain worsening, 10/10 at worst, 7/10 at best, always present, prevents work and housework, burning, sharp, varies. Prior notes reviewed, referred for worsening pain, does not have time for PT. Increased to Lyrica 100mg BID then TID, started Celebrex, Cymbalta 30mg then 60mg qHS, fewer flares but still severe fibromyalgia pain, also LBP and b/l hip pain. Started Norco 5/325mg, mostly taking qHS, some days BID with worsening pain with cold weather, becoming less effective, rotated to Percocet 5/325mg. C/o insomnia. Went to ED with severe pain not controlled with Percocet 5/325mg BID prn, now QID prn. Had gastric sleeve surgery, liquid Norco worked well, restarted Percocet with severe N/V, vomited up complete prescription. Started liquid Norco, working better, but pain worsening, having difficulty with ADLs, increased Norco and Lyrica, then MS-IR 15mg QID prn with pill . Worsening L \"hip\" pain, insomnia, constipation. Requesting NSAID. Had toni. Has FH of autoimmune disorders and MS, hasn't heard from Rheum. Needs PT before having injection, worsening SIJ pain even with PT.       The following portions of the patient's history were reviewed and updated as appropriate: allergies, current medications, past family history, past medical history, past social history, past surgical history and problem list.    Review of Systems   Constitutional: Negative for chills, fatigue and fever.   HENT: Negative for hearing loss and trouble swallowing.    Eyes: Positive for visual disturbance.   Respiratory: Positive for shortness of breath.    Cardiovascular: Negative for chest pain.   Gastrointestinal: Positive for constipation. Negative for abdominal pain, diarrhea, nausea and " vomiting.   Genitourinary: Negative for urinary incontinence.   Musculoskeletal: Positive for arthralgias, back pain and neck pain. Negative for joint swelling and myalgias.   Neurological: Positive for headache. Negative for dizziness, weakness and numbness.       Objective   Physical Exam   Constitutional: She is oriented to person, place, and time. She appears well-developed and well-nourished.   HENT:   Head: Normocephalic and atraumatic.   Eyes: Pupils are equal, round, and reactive to light. EOM are normal.   Neck: Normal range of motion.   Cardiovascular: Normal rate, regular rhythm, normal heart sounds and intact distal pulses.   Pulmonary/Chest: Breath sounds normal.   Abdominal: Soft. Bowel sounds are normal. She exhibits no distension. There is no tenderness.   Musculoskeletal: She exhibits tenderness.   positive gianni finger test, TTP over b/l SIJ, b/l CARINA, and Gaenslens   Neurological: She is alert and oriented to person, place, and time. She has normal strength and normal reflexes. She displays normal reflexes. No sensory deficit.   Psychiatric: She has a normal mood and affect. Her behavior is normal. Thought content normal.         Assessment/Plan   Alyson was seen today for back pain.    Diagnoses and all orders for this visit:    Fibromyalgia    Chronic midline low back pain, unspecified whether sciatica present    Neck pain, chronic    Pain in joint involving multiple sites    Pain in right arm    Paresthesia of upper limb    Chronic right shoulder pain    Sacroiliitis, not elsewhere classified (CMS/HCC)    Other long term (current) drug therapy        INspect reviewed, in order. Low risk. Repeat UDS 7/23/19 in order.  Increased to Lyrica 200mg TID, sedating, numbs her lips, but benefit outweighs side effects.  Cont Cymbalta 60mg qHS, helping, and sleeping better.  Stopped Norco 5/325mg, failed Percocet 5/325mg, started liquid Norco 7.5/325mg/15ml 15ml QID prn, #1800. Increased to q4h prn, #2700,  no longer working. Increased to MS-IR 15mg q6h prn, has pill , helping a lot more.  Failed Mobic 7.5mg qd - BID prn, failed Celebrex.  Cont Silenor.  Cont Relistor 450mg qdaily, failed Miralax, colace.  Cont Precision compounded cream, helping.  New referral to Rheum.  Referral to PT for LBP and L hip pain.  Schedule b/l SIJ injection.  RTC for injection then in 3 months for f/u.

## 2019-12-18 ENCOUNTER — APPOINTMENT (OUTPATIENT)
Dept: GENERAL RADIOLOGY | Facility: HOSPITAL | Age: 34
End: 2019-12-18

## 2019-12-18 ENCOUNTER — HOSPITAL ENCOUNTER (EMERGENCY)
Facility: HOSPITAL | Age: 34
Discharge: HOME | End: 2019-12-18
Admitting: EMERGENCY MEDICINE

## 2019-12-18 VITALS
SYSTOLIC BLOOD PRESSURE: 108 MMHG | DIASTOLIC BLOOD PRESSURE: 66 MMHG | RESPIRATION RATE: 18 BRPM | TEMPERATURE: 98.7 F | BODY MASS INDEX: 30.7 KG/M2 | OXYGEN SATURATION: 100 % | HEART RATE: 68 BPM | WEIGHT: 173.28 LBS | HEIGHT: 63 IN

## 2019-12-18 DIAGNOSIS — S91.311A LACERATION OF DORSUM OF FOOT, RIGHT, INITIAL ENCOUNTER: Primary | ICD-10-CM

## 2019-12-18 PROCEDURE — 99282 EMERGENCY DEPT VISIT SF MDM: CPT

## 2019-12-18 PROCEDURE — 90715 TDAP VACCINE 7 YRS/> IM: CPT | Performed by: NURSE PRACTITIONER

## 2019-12-18 PROCEDURE — 73620 X-RAY EXAM OF FOOT: CPT

## 2019-12-18 PROCEDURE — 99283 EMERGENCY DEPT VISIT LOW MDM: CPT

## 2019-12-18 PROCEDURE — 90471 IMMUNIZATION ADMIN: CPT | Performed by: NURSE PRACTITIONER

## 2019-12-18 PROCEDURE — 25010000002 TDAP 5-2.5-18.5 LF-MCG/0.5 SUSPENSION: Performed by: NURSE PRACTITIONER

## 2019-12-18 RX ADMIN — TETANUS TOXOID, REDUCED DIPHTHERIA TOXOID AND ACELLULAR PERTUSSIS VACCINE, ADSORBED 0.5 ML: 5; 2.5; 8; 8; 2.5 SUSPENSION INTRAMUSCULAR at 20:52

## 2019-12-19 NOTE — ED PROVIDER NOTES
"Subjective   34-year-old female presents with of laceration to the proximal dorsum of the right foot.  She reports at 6:30 PM and knife \"fell off the counter into her foot\".  She is unsure of her last tetanus.  She reports that she is unable to dorsiflex her great toe.    1. Location: Dorsum of right foot  2. Quality: Sore  3. Severity: Moderate  4. Worsening factors: Palpation  5. Alleviating factors: Denies  6. Onset: Prior to arrival  7. Radiation: None  8. Frequency: Constant  9. Co-morbidities: Allergic rhinitis, anemia, rheumatoid arthritis, asthma, chronic constipation, cervical cancer, chronic pain disorder, depression, fibromyalgia, headache, back pain, neck pain, peripheral neuropathy, PTSD, heat stroke, tachycardia  10. Source: Patient            Review of Systems   Musculoskeletal: Positive for arthralgias and myalgias. Negative for gait problem.   Skin: Positive for wound. Negative for color change, pallor and rash.   Neurological: Negative for weakness and numbness.   Hematological: Does not bruise/bleed easily.   All other systems reviewed and are negative.      Past Medical History:   Diagnosis Date   • Allergic    • Anemia    • Arthritis    • Asthma    • Cancer (CMS/HCC)     cervical 2008   • Chronic constipation    • Chronic pain disorder    • Depression    • Extremity pain    • Fibromyalgia, primary    • Headache    • Injury of back    • Joint pain    • Low back pain    • Neck pain    • Peripheral neuropathy    • PTSD (post-traumatic stress disorder)    • Shortness of breath    • Stroke (CMS/HCC)     Mini heat stroke   • Tachycardia        Allergies   Allergen Reactions   • Adhesive Tape Hives   • Latex Hives and Unknown - High Severity       Past Surgical History:   Procedure Laterality Date   • CARDIAC CATHETERIZATION N/A 11/5/2019    Procedure: Coronary angiography;  Surgeon: Leo Fagan MD;  Location: [facility] INVASIVE LOCATION;  Service: Cardiovascular   • CARDIAC CATHETERIZATION Left " 2019    Procedure: Cardiac Catheterization/Vascular Study;  Surgeon: Leo Fagan MD;  Location: [facility] CATH INVASIVE LOCATION;  Service: Cardiovascular   • CARDIAC CATHETERIZATION N/A 2019    Procedure: Left ventriculography;  Surgeon: Leo Fagan MD;  Location: [facility] CATH INVASIVE LOCATION;  Service: Cardiovascular   •  SECTION      x2   • CHOLECYSTECTOMY     • ENDOSCOPY     • GASTRIC SLEEVE LAPAROSCOPIC     • HAND SURGERY     • TUBAL ABDOMINAL LIGATION         Family History   Problem Relation Age of Onset   • No Known Problems Mother    • Cancer Father    • Coronary artery disease Father    • Diabetes Father    • Stroke Father    • Heart attack Father    • Mental illness Brother    • Mental illness Son        Social History     Socioeconomic History   • Marital status:      Spouse name: Not on file   • Number of children: Not on file   • Years of education: Not on file   • Highest education level: Not on file   Tobacco Use   • Smoking status: Former Smoker     Types: Cigarettes     Last attempt to quit: 2006     Years since quittin.9   • Smokeless tobacco: Never Used   • Tobacco comment: 1 cigarette every 3-4 days   Substance and Sexual Activity   • Alcohol use: No     Frequency: Never   • Drug use: No   • Sexual activity: Defer           Objective   Physical Exam   Constitutional: She is oriented to person, place, and time. She appears well-developed and well-nourished. No distress.   Cardiovascular: Intact distal pulses.   Musculoskeletal: Normal range of motion. She exhibits tenderness. She exhibits no edema or deformity.        Right foot: There is tenderness and laceration. There is normal range of motion, no bony tenderness, no swelling, normal capillary refill, no crepitus and no deformity.   Patient is able to flex the great toe but is unable to fully dorsiflex related to pain.  Upon utilization of the wound, there is no obvious injury to the tendon.  See procedure for  further        Neurological: She is alert and oriented to person, place, and time. No sensory deficit. She exhibits normal muscle tone.   Skin: Skin is warm and dry. Capillary refill takes less than 2 seconds. No pallor.   Psychiatric: She has a normal mood and affect. Her behavior is normal. Judgment and thought content normal.   Nursing note and vitals reviewed.      Laceration Repair  Date/Time: 12/18/2019 9:10 AM  Performed by: Sravani Lees NP  Authorized by: Sravani Lees NP     Consent:     Consent obtained:  Verbal    Consent given by:  Patient    Risks discussed:  Pain and tendon damage    Alternatives discussed:  No treatment and referral  Anesthesia (see MAR for exact dosages):     Anesthesia method:  Local infiltration    Local anesthetic:  Lidocaine 1% WITH epi  Laceration details:     Location:  Foot    Foot location:  Top of R foot    Length (cm):  2.5  Repair type:     Repair type:  Simple  Pre-procedure details:     Preparation:  Patient was prepped and draped in usual sterile fashion and imaging obtained to evaluate for foreign bodies  Exploration:     Hemostasis achieved with:  Direct pressure    Wound exploration: wound explored through full range of motion and entire depth of wound probed and visualized      Wound exploration comment:  Unable to visualize any tendon damage    Contaminated: no    Treatment:     Area cleansed with:  Hibiclens and saline    Amount of cleaning:  Extensive    Irrigation solution:  Sterile saline    Irrigation method:  Syringe    Visualized foreign bodies/material removed: no    Skin repair:     Repair method:  Sutures    Suture size:  5-0    Suture technique:  Simple interrupted    Number of sutures:  6  Approximation:     Approximation:  Close  Post-procedure details:     Dressing:  Antibiotic ointment and non-adherent dressing    Patient tolerance of procedure:  Tolerated well, no immediate complications               ED Course      Xr Foot 2 View  "Right    Result Date: 12/18/2019  Laceration involving the dorsal aspect of the right mid foot with no radiopaque foreign body or acute fracture.  Electronically Signed By-Jorge L Pardo On:12/18/2019 8:42 PM This report was finalized on [id number] by  Jorge L Pardo, .    Medications   Tdap (BOOSTRIX) injection 0.5 mL (0.5 mL Intramuscular Given 12/18/19 2052)     Labs Reviewed - No data to display                  No data recorded                        MDM  Number of Diagnoses or Management Options  Laceration of dorsum of foot, right, initial encounter:   Diagnosis management comments: Chart Review: 12/17/2019 patient was seen by pain management for follow-up on her fibromyalgia and rheumatoid arthritis.  Comorbidity:  Allergic rhinitis, anemia, rheumatoid arthritis, asthma, chronic constipation, cervical cancer, chronic pain disorder, depression, fibromyalgia, headache, back pain, neck pain, peripheral neuropathy, PTSD, heat stroke, tachycardia  Imaging: Was interpreted by physician and reviewed by myself: Xr Foot 2 View Right    Result Date: 12/18/2019  Laceration involving the dorsal aspect of the right mid foot with no radiopaque foreign body or acute fracture.  Electronically Signed By-Jorge L Pardo On:12/18/2019 8:42 PM This report was finalized on [id number] by  Jorge L Pardo, .    Disposition/Treatment: Discussed results with patient, verbalized understanding.  Agreeable with plan of care.    Patient undressed and placed in gown for exam. 34-year-old female presents with of laceration to the proximal dorsum of the right foot.  She reports at 6:30 PM and knife \"fell off the counter into her foot\".  She is unsure of her last tetanus.  She reports that she is unable to dorsiflex her great toe. Patient is able to flex the great toe but is unable to fully dorsiflex related to pain.  Upon utilization of the wound, there is no obvious injury to the tendon.  See procedure for further.  Patient was placed in a postop " shoe and given follow-up with podiatry.  She denies history diabetes.  She is encouraged to cleanse the wound twice daily with antibacterial soap and water place bacitracin bandage over the wound.  She is to have her sutures removed in the next 10 days.  She is to return to the ER for any new or worsening symptoms.         Amount and/or Complexity of Data Reviewed  Tests in the radiology section of CPT®: reviewed    Patient Progress  Patient progress: stable      Final diagnoses:   Laceration of dorsum of foot, right, initial encounter              Sravani Lees NP  12/19/19 0011

## 2019-12-19 NOTE — DISCHARGE INSTRUCTIONS
Have wound rechecked in 3 days by your physician.  Cleanse twice daily with antibacterial soap and water then apply bacitracin and bandage.  Keep covered and wear shoe as directed.  As discussed, follow-up with podiatrist for potential tendon evaluation related to the inability to pull your toe up.  There is no obvious damage to your tendon prior to suture repair.  Return to ER for any new or worsening symptoms.

## 2019-12-26 ENCOUNTER — OFFICE VISIT (OUTPATIENT)
Dept: PODIATRY | Facility: CLINIC | Age: 34
End: 2019-12-26

## 2019-12-26 VITALS
WEIGHT: 173 LBS | DIASTOLIC BLOOD PRESSURE: 63 MMHG | SYSTOLIC BLOOD PRESSURE: 99 MMHG | HEIGHT: 63 IN | BODY MASS INDEX: 30.65 KG/M2 | HEART RATE: 54 BPM

## 2019-12-26 DIAGNOSIS — S96.921A FOOT LACERATION INVOLVING TENDON, RIGHT, INITIAL ENCOUNTER: Primary | ICD-10-CM

## 2019-12-26 DIAGNOSIS — S91.311A FOOT LACERATION INVOLVING TENDON, RIGHT, INITIAL ENCOUNTER: Primary | ICD-10-CM

## 2019-12-26 PROCEDURE — 99203 OFFICE O/P NEW LOW 30 MIN: CPT | Performed by: PODIATRIST

## 2019-12-26 NOTE — PROGRESS NOTES
12/26/2019  Foot and Ankle Surgery - New Patient   Provider: Dr. Adeel Rajan DPM  Location: [facility]    Subjective:  Alyson Lew is a 34 y.o. female.     Chief Complaint   Patient presents with   • Right Foot - Pain       HPI: Patient is a 34-year-old female that presents after laceration to the dorsal aspect of the right foot.  She states that approximately 1 week ago she was cooking and dropped a knife on her foot.  She reported to the ED after the injury.  Imaging was performed and she was sutured.  There was question of whether tendon involvement was present.  Patient states that she has been ambulating in a postop shoe.  She continues to have significant discomfort and limitation.  She has been trying to move the great toe with difficulty.  She denies any previous injuries to the right lower extremity.  She rates her symptoms an 8 out of 10.    Allergies   Allergen Reactions   • Adhesive Tape Hives   • Latex Hives and Unknown - High Severity       Past Medical History:   Diagnosis Date   • Allergic    • Anemia    • Arthritis    • Asthma    • Cancer (CMS/[facility])     cervical 2008   • Chronic constipation    • Chronic pain disorder    • Depression    • Extremity pain    • Fibromyalgia, primary    • Headache    • Injury of back    • Joint pain    • Low back pain    • Neck pain    • Peripheral neuropathy    • PTSD (post-traumatic stress disorder)    • Shortness of breath    • Stroke (CMS/HCC)     Mini heat stroke   • Tachycardia        Past Surgical History:   Procedure Laterality Date   • CARDIAC CATHETERIZATION N/A 11/5/2019    Procedure: Coronary angiography;  Surgeon: Leo Fagan MD;  Location: [facility] CATH INVASIVE LOCATION;  Service: Cardiovascular   • CARDIAC CATHETERIZATION Left 11/5/2019    Procedure: Cardiac Catheterization/Vascular Study;  Surgeon: Leo Fagan MD;  Location: [facility] CATH INVASIVE LOCATION;  Service: Cardiovascular   • CARDIAC CATHETERIZATION N/A 11/5/2019     Procedure: Left ventriculography;  Surgeon: Leo Fagan MD;  Location: [facility] INVASIVE LOCATION;  Service: Cardiovascular   •  SECTION      x2   • CHOLECYSTECTOMY     • ENDOSCOPY     • GASTRIC SLEEVE LAPAROSCOPIC     • HAND SURGERY     • TUBAL ABDOMINAL LIGATION         Family History   Problem Relation Age of Onset   • No Known Problems Mother    • Cancer Father    • Coronary artery disease Father    • Diabetes Father    • Stroke Father    • Heart attack Father    • Mental illness Brother    • Mental illness Son        Social History     Socioeconomic History   • Marital status:      Spouse name: Not on file   • Number of children: Not on file   • Years of education: Not on file   • Highest education level: Not on file   Tobacco Use   • Smoking status: Former Smoker     Types: Cigarettes     Last attempt to quit: 2006     Years since quittin.9   • Smokeless tobacco: Never Used   • Tobacco comment: 1 cigarette every 3-4 days   Substance and Sexual Activity   • Alcohol use: No     Frequency: Never   • Drug use: No   • Sexual activity: Defer        Current Outpatient Medications on File Prior to Visit   Medication Sig Dispense Refill   • albuterol sulfate HFA (PROAIR HFA) 108 (90 Base) MCG/ACT inhaler Inhale 2 puffs Every 4 (Four) Hours As Needed for Wheezing. 1 inhaler 1   • fluticasone (FLONASE) 50 MCG/ACT nasal spray 2 sprays into the nostril(s) as directed by provider Daily. 1 bottle 2   • levothyroxine (LEVO-T) 75 MCG tablet Take 1 tablet by mouth Daily. 30 tablet 1   • linaclotide (LINZESS) 145 MCG capsule capsule Take 1 capsule by mouth Every Morning Before Breakfast. 30 capsule    • LYRICA 200 MG capsule Take 1 capsule by mouth 3 (Three) Times a Day. 90 capsule 2   • Morphine (MS CONTIN) 30 MG 12 hr tablet Take 1 tablet by mouth 2 (Two) Times a Day. 60 tablet 0   • naproxen (NAPROSYN) 500 MG tablet TAKE ONE (1) TABLET BY MOUTH EVERY TWELVE HOURS WITH FOOD OR MILK AS NEEDED  0   •  "Pediatric Multiple Vit-C-FA (FLINSTONES GUMMIES OMEGA-3 DHA) chewable tablet FLINSTONES GUMMIES OMEGA-3 DHA CHEW     • ranitidine (ZANTAC) 150 MG capsule Take 1 capsule by mouth Every Evening. 90 capsule 1   • traZODone (DESYREL) 150 MG tablet Take 1 tablet by mouth Every Night. 30 tablet 0     No current facility-administered medications on file prior to visit.        Review of Systems:  General: Denies fever, chills, fatigue, and weakness.  Eyes: Denies vision loss, blurry vision, and excessive redness.  ENT: Denies hearing issues and difficulty swallowing.  Cardiovascular: Denies palpitations, chest pain, or syncopal episodes.  Respiratory: Denies shortness of breath, wheezing, and coughing.  GI: Denies abdominal pain, nausea, and vomiting.   : Denies frequency, hematuria, and urgency.  Musculoskeletal: Denies muscle cramps, joint pains, and stiffness.  Derm: + Right foot laceration  Neuro: Denies headaches, numbness, loss of coordination, and tremors.  Psych: Denies anxiety and depression.  Endocrine: Denies temperature intolerance and changes in appetite.  Heme: Denies bleeding disorders or abnormal bruising.     Objective   BP 99/63   Pulse 54   Ht 160 cm (63\")   Wt 78.5 kg (173 lb)   BMI 30.65 kg/m²     Foot/Ankle Exam:       General:   Appearance: appears stated age and healthy    Orientation: AAOx3    Affect: appropriate    Gait: antalgic       Right Foot/Ankle:      Vascular   Dorsalis pedis:  2+  Posterior tibial:  2+  Skin Temperature: warm    Edema Grading:  None  CFT:  < 3 seconds  Pedal Hair Growth:  Present     Neurologic   Right ankle sensation: Allodynia and paresthesia noted to the laceration site.  Vibratory sensation:  Normal  Hot/Cold sensation: normal    Protective Sensation using Deposit-Jigna Monofilament:  10  Achilles reflex:  2+     Musculoskeletal   Ecchymosis:  None  Tenderness: dorsal foot    Arch:  Normal     Range of Motion   Foot and ankle ROM within normal limits       " Dermatologic   Skin comment:  Transverse laceration to the dorsal aspect of the midfoot measuring approximately 2.5 cm.  Intact sutures.  No evidence of erythema, drainage, or dehiscence.      Additional Comments Muscle strength, especially extensor hallucis longus, is difficult to assess secondary to guarding      Assessment/Plan   Alyson was seen today for pain.    Diagnoses and all orders for this visit:    Foot laceration involving tendon, right, initial encounter  -     MRI Foot Right Without Contrast; Future      Patient presents approximately 1 week after laceration to the right foot.  The laceration does appear dry and stable with intact sutures.  Sutures were removed today with no thomas dehiscence.  Steri-Strips were applied.  I have asked that she decrease her overall activity and remain in the postop shoe.  There is some concern for EHL tendon involvement.  Full assessment is difficult due to pain and guarding.  I have recommended that we proceed with an MRI for further evaluation of the integrity of the tendon.  I would like to see her in 1 to 2 weeks for reevaluation.  She is to call with any concerning features to the laceration site.  I would like her to start gentle manual therapy and stretching exercises..    Orders Placed This Encounter   Procedures   • MRI Foot Right Without Contrast     Standing Status:   Future     Standing Expiration Date:   12/26/2020     Order Specific Question:   Patient Pregnant     Answer:   No        Note is dictated utilizing voice recognition software. Unfortunately this leads to occasional typographical errors. I apologize in advance if the situation occurs. If questions occur please do not hesitate to call our office.

## 2019-12-27 ENCOUNTER — DOCUMENTATION (OUTPATIENT)
Dept: PHYSICAL THERAPY | Facility: CLINIC | Age: 34
End: 2019-12-27

## 2019-12-27 NOTE — PROGRESS NOTES
Discharge Summary  Discharge Summary from Physical Therapy Report      Dates  PT visit: 10/15/19-11/19/19  Number of Visits: 4     Discharge Status of Patient: Patient has not returned for continued care.  Her chart will be discharged at this time.    Goals: Not Met    Discharge Plan: Continue with current home exercise program as instructed  Patient to return to referring/providing physician    Date of Discharge 12/27/19        Mary Rosales, PT  Physical Therapist

## 2020-01-07 ENCOUNTER — APPOINTMENT (OUTPATIENT)
Dept: MRI IMAGING | Facility: HOSPITAL | Age: 35
End: 2020-01-07

## 2020-01-14 ENCOUNTER — HOSPITAL ENCOUNTER (OUTPATIENT)
Dept: MRI IMAGING | Facility: HOSPITAL | Age: 35
Discharge: HOME | End: 2020-01-14
Admitting: PODIATRIST

## 2020-01-14 ENCOUNTER — OFFICE VISIT (OUTPATIENT)
Dept: BARIATRICS/WEIGHT MGMT | Facility: CLINIC | Age: 35
End: 2020-01-14

## 2020-01-14 VITALS
SYSTOLIC BLOOD PRESSURE: 91 MMHG | OXYGEN SATURATION: 98 % | HEART RATE: 56 BPM | HEIGHT: 63 IN | DIASTOLIC BLOOD PRESSURE: 43 MMHG | RESPIRATION RATE: 16 BRPM | BODY MASS INDEX: 28.63 KG/M2 | WEIGHT: 161.6 LBS

## 2020-01-14 DIAGNOSIS — Z98.84 BARIATRIC SURGERY STATUS: Primary | ICD-10-CM

## 2020-01-14 DIAGNOSIS — S91.311A FOOT LACERATION INVOLVING TENDON, RIGHT, INITIAL ENCOUNTER: ICD-10-CM

## 2020-01-14 DIAGNOSIS — S96.921A FOOT LACERATION INVOLVING TENDON, RIGHT, INITIAL ENCOUNTER: ICD-10-CM

## 2020-01-14 PROCEDURE — 99213 OFFICE O/P EST LOW 20 MIN: CPT | Performed by: SURGERY

## 2020-01-14 PROCEDURE — 73718 MRI LOWER EXTREMITY W/O DYE: CPT

## 2020-01-14 RX ORDER — OMEPRAZOLE 40 MG/1
40 CAPSULE, DELAYED RELEASE ORAL DAILY
Qty: 30 CAPSULE | Refills: 6 | Status: SHIPPED | OUTPATIENT
Start: 2020-01-14

## 2020-01-14 NOTE — PROGRESS NOTES
[facility]  [address] [place] [address]  [address] [place] [address]  Dept: [phone]  1/14/2020      Alyson Lew.  [id number]  [id number]  1985  female      Chief Complaint   Patient presents with   • Follow-up      18 month        BH Post-Op Bariatric Surgery:   Alyson Lew is status post procedure listed above  HPI:     Wt Readings from Last 10 Encounters:   01/14/20 73.3 kg (161 lb 9.6 oz)   12/26/19 78.5 kg (173 lb)   12/18/19 78.6 kg (173 lb 4.5 oz)   12/17/19 72.1 kg (159 lb)   11/05/19 74.9 kg (165 lb 2 oz)   10/31/19 75.8 kg (167 lb)   10/29/19 76.2 kg (168 lb)   10/24/19 75.6 kg (166 lb 10.7 oz)   10/15/19 75.3 kg (166 lb)   09/24/19 76.2 kg (168 lb)      Original wt 275   Today's weight is 73.3 kg (161 lb 9.6 oz) pounds, today's BMI is Body mass index is 28.63 kg/m².,@ has a  loss of 15 pounds since the last visit and@ weight loss since surgery is 110 pounds. The patient reports a decreased portion size and loss of appetite.      Alyson Lew have GERD and some nausea. She takes morphine for fibromyalgia.      Diet and Exercise: Diet history reviewed and discussed with the patient. Weight loss/gains to date discussed with the patient. The patient states they are eating 20 grams of protein per day. She reports eating 2-3 meals per day, a typical portion size of 1 cup, eating 1 snacks per day, drinking 8 or more 8-oz. glasses of water per day, no carbonated beverage consumption and exercising regularly.     Lite meals, 2-3 meals    Supplements: none.     Review of Systems   Constitutional: Negative.    HENT: Negative.    Eyes: Negative.    Respiratory: Negative.    Cardiovascular: Negative.    Gastrointestinal: Negative.    Endocrine: Negative.    Genitourinary: Negative.    Musculoskeletal: Negative.    Skin: Negative.    Allergic/Immunologic: Negative.    Neurological: Negative.     Hematological: Negative.    Psychiatric/Behavioral: Negative.        Patient Active Problem List   Diagnosis   • Drug-induced constipation   • Pain in joint involving multiple sites   • Fibromyalgia   • Low back pain   • Neck pain, chronic   • Sacroiliitis, not elsewhere classified (CMS/HCC)   • Asthma   • Body mass index 45.0-49.9, adult (CMS/HCC)   • Dyslipidemia   • Elevated antinuclear antibody (GREGORY) level   • Ganglion of wrist   • Heartburn   • Hypothyroidism   • Inflammatory arthritis   • Insomnia   • Irregular menstrual cycle   • Morbid obesity (CMS/HCC)   • Dietary counseling and surveillance   • Preoperative evaluation to rule out surgical contraindication   • Orthopedic aftercare   • Other long term (current) drug therapy   • Pain in right arm   • Shoulder pain, right   • Paresthesia of upper limb   • Sprain of interphalangeal joint of right middle finger   • Sprain of hand   • Status post bariatric surgery   • Tear of medial meniscus of knee   • Thiamine deficiency   • Palpitations   • Near syncope   • SOB (shortness of breath)   • Ventricular tachycardia (paroxysmal) (CMS/HCC)   • Precordial pain       Past Medical History:   Diagnosis Date   • Allergic    • Anemia    • Arthritis    • Asthma    • Cancer (CMS/HCC)     cervical 2008   • Chronic constipation    • Chronic pain disorder    • Depression    • Extremity pain    • Fibromyalgia, primary    • Headache    • Injury of back    • Joint pain    • Low back pain    • Neck pain    • Peripheral neuropathy    • PTSD (post-traumatic stress disorder)    • Shortness of breath    • Stroke (CMS/HCC)     Mini heat stroke   • Tachycardia        The following portions of the patient's history were reviewed and updated as appropriate: allergies, current medications, past family history, past medical history, past social history, past surgical history and problem list.    Vitals:    01/14/20 1403   BP: 91/43   Pulse: 56   Resp: 16   SpO2: 98%       Physical Exam    Constitutional: She is oriented to person, place, and time. She appears well-developed and well-nourished.   HENT:   Head: Normocephalic and atraumatic.   Eyes: Pupils are equal, round, and reactive to light. Conjunctivae and EOM are normal.   Neck: Normal range of motion. Neck supple.   Cardiovascular: Normal rate, regular rhythm, normal heart sounds and intact distal pulses.   Pulmonary/Chest: Effort normal and breath sounds normal.   Abdominal: Soft. Bowel sounds are normal. She exhibits no distension and no mass. There is no tenderness. There is no rebound and no guarding. No hernia.   Musculoskeletal: Normal range of motion. She exhibits no edema.   Neurological: She is alert and oriented to person, place, and time.   Skin: Skin is warm and dry.   Psychiatric: She has a normal mood and affect.   Vitals reviewed.         Assessment:   Post-op, the patient is having GERD and not getting enough protein.     Plan:     Encouraged patient to be sure to get plenty of lean protein per day through small frequent meals all with a protein source.   Activity restrictions: none.   Recommended patient be sure to get at least 70 grams of protein per day by eating small, frequent meals all with high lean protein choices. Be sure to limit/cut back on daily carbohydrate intake. Discussed with the patient the recommended amount of water per day to intake- half of body weight in ounces. Reviewed vitamin requirements. Be sure to do routine exercise, 150 minutes per week minimum, including both cardio and strength training.     Instructions / Recommendations: dietary counseling recommended, recommended a daily protein intake of  grams, vitamin supplement(s) recommended, recommended exercising at least 150 minutes per week, behavior modifications recommended and instructed to call the office for concerns, questions, or problems.     The patient was instructed to follow up in 1 year .     The patient was counseled regarding.  Total time spent face to face was 15 minutes and 15 minutes was spent counseling.

## 2020-01-19 ENCOUNTER — RESULTS ENCOUNTER (OUTPATIENT)
Dept: BARIATRICS/WEIGHT MGMT | Facility: CLINIC | Age: 35
End: 2020-01-19

## 2020-01-19 DIAGNOSIS — Z98.84 BARIATRIC SURGERY STATUS: ICD-10-CM

## 2020-01-21 ENCOUNTER — HOSPITAL ENCOUNTER (OUTPATIENT)
Dept: CARDIOLOGY | Facility: HOSPITAL | Age: 35
Discharge: HOME | End: 2020-01-21

## 2020-01-21 ENCOUNTER — OFFICE VISIT (OUTPATIENT)
Dept: CARDIOLOGY | Facility: CLINIC | Age: 35
End: 2020-01-21

## 2020-01-21 ENCOUNTER — LAB (OUTPATIENT)
Dept: LAB | Facility: HOSPITAL | Age: 35
End: 2020-01-21

## 2020-01-21 ENCOUNTER — HOSPITAL ENCOUNTER (OUTPATIENT)
Dept: RESPIRATORY THERAPY | Facility: HOSPITAL | Age: 35
Discharge: HOME | End: 2020-01-21
Admitting: NURSE PRACTITIONER

## 2020-01-21 VITALS
DIASTOLIC BLOOD PRESSURE: 52 MMHG | HEART RATE: 58 BPM | BODY MASS INDEX: 29.38 KG/M2 | HEIGHT: 63 IN | WEIGHT: 165.8 LBS | SYSTOLIC BLOOD PRESSURE: 90 MMHG

## 2020-01-21 DIAGNOSIS — R06.02 SOB (SHORTNESS OF BREATH): ICD-10-CM

## 2020-01-21 DIAGNOSIS — R00.2 PALPITATIONS: ICD-10-CM

## 2020-01-21 DIAGNOSIS — I47.29 VENTRICULAR TACHYCARDIA (PAROXYSMAL) (HCC): Primary | ICD-10-CM

## 2020-01-21 DIAGNOSIS — R55 NEAR SYNCOPE: ICD-10-CM

## 2020-01-21 DIAGNOSIS — I47.29 VENTRICULAR TACHYCARDIA (PAROXYSMAL) (HCC): ICD-10-CM

## 2020-01-21 DIAGNOSIS — Z98.84 BARIATRIC SURGERY STATUS: ICD-10-CM

## 2020-01-21 PROBLEM — M19.91 PRIMARY OSTEOARTHRITIS: Status: ACTIVE | Noted: 2020-01-21

## 2020-01-21 PROBLEM — M54.16 LUMBAR RADICULOPATHY: Status: ACTIVE | Noted: 2020-01-21

## 2020-01-21 PROBLEM — M23.359 DERANGEMENT OF POSTERIOR HORN OF LATERAL MENISCUS: Status: ACTIVE | Noted: 2020-01-21

## 2020-01-21 LAB
ALBUMIN SERPL-MCNC: 4.2 G/DL (ref 3.5–5.2)
ALBUMIN/GLOB SERPL: 1.4 G/DL
ALP SERPL-CCNC: 41 U/L (ref 39–117)
ALT SERPL W P-5'-P-CCNC: 6 U/L (ref 1–33)
ANION GAP SERPL CALCULATED.3IONS-SCNC: 12.9 MMOL/L (ref 5–15)
AST SERPL-CCNC: 12 U/L (ref 1–32)
BASOPHILS # BLD AUTO: 0.03 10*3/MM3 (ref 0–0.2)
BASOPHILS NFR BLD AUTO: 0.6 % (ref 0–1.5)
BILIRUB SERPL-MCNC: 0.3 MG/DL (ref 0.2–1.2)
BUN BLD-MCNC: 15 MG/DL (ref 6–20)
BUN/CREAT SERPL: 25.9 (ref 7–25)
CALCIUM SPEC-SCNC: 9 MG/DL (ref 8.6–10.5)
CHLORIDE SERPL-SCNC: 101 MMOL/L (ref 98–107)
CO2 SERPL-SCNC: 24.1 MMOL/L (ref 22–29)
CREAT BLD-MCNC: 0.58 MG/DL (ref 0.57–1)
DEPRECATED RDW RBC AUTO: 42.2 FL (ref 37–54)
EOSINOPHIL # BLD AUTO: 0.13 10*3/MM3 (ref 0–0.4)
EOSINOPHIL NFR BLD AUTO: 2.6 % (ref 0.3–6.2)
ERYTHROCYTE [DISTWIDTH] IN BLOOD BY AUTOMATED COUNT: 13 % (ref 12.3–15.4)
FERRITIN SERPL-MCNC: 6.1 NG/ML (ref 13–150)
FOLATE SERPL-MCNC: 5.44 NG/ML (ref 4.78–24.2)
GFR SERPL CREATININE-BSD FRML MDRD: 120 ML/MIN/1.73
GLOBULIN UR ELPH-MCNC: 3 GM/DL
GLUCOSE BLD-MCNC: 88 MG/DL (ref 65–99)
HCT VFR BLD AUTO: 30.1 % (ref 34–46.6)
HGB BLD-MCNC: 9.4 G/DL (ref 12–15.9)
IMM GRANULOCYTES # BLD AUTO: 0.01 10*3/MM3 (ref 0–0.05)
IMM GRANULOCYTES NFR BLD AUTO: 0.2 % (ref 0–0.5)
IRON 24H UR-MRATE: 50 MCG/DL (ref 37–145)
LYMPHOCYTES # BLD AUTO: 2.34 10*3/MM3 (ref 0.7–3.1)
LYMPHOCYTES NFR BLD AUTO: 46.8 % (ref 19.6–45.3)
MAGNESIUM SERPL-MCNC: 2.1 MG/DL (ref 1.6–2.6)
MCH RBC QN AUTO: 27.6 PG (ref 26.6–33)
MCHC RBC AUTO-ENTMCNC: 31.2 G/DL (ref 31.5–35.7)
MCV RBC AUTO: 88.5 FL (ref 79–97)
MONOCYTES # BLD AUTO: 0.57 10*3/MM3 (ref 0.1–0.9)
MONOCYTES NFR BLD AUTO: 11.4 % (ref 5–12)
NEUTROPHILS # BLD AUTO: 1.92 10*3/MM3 (ref 1.7–7)
NEUTROPHILS NFR BLD AUTO: 38.4 % (ref 42.7–76)
NRBC BLD AUTO-RTO: 0 /100 WBC (ref 0–0.2)
PHOSPHATE SERPL-MCNC: 3.5 MG/DL (ref 2.5–4.5)
PLATELET # BLD AUTO: 262 10*3/MM3 (ref 140–450)
PMV BLD AUTO: 10.2 FL (ref 6–12)
POTASSIUM BLD-SCNC: 3.8 MMOL/L (ref 3.5–5.2)
PREALB SERPL-MCNC: 19.6 MG/DL (ref 20–40)
PROT SERPL-MCNC: 7.2 G/DL (ref 6–8.5)
PTH-INTACT SERPL-MCNC: 24.5 PG/ML (ref 15–65)
RBC # BLD AUTO: 3.4 10*6/MM3 (ref 3.77–5.28)
SODIUM BLD-SCNC: 138 MMOL/L (ref 136–145)
WBC NRBC COR # BLD: 5 10*3/MM3 (ref 3.4–10.8)

## 2020-01-21 PROCEDURE — 85025 COMPLETE CBC W/AUTO DIFF WBC: CPT

## 2020-01-21 PROCEDURE — 83921 ORGANIC ACID SINGLE QUANT: CPT

## 2020-01-21 PROCEDURE — 84425 ASSAY OF VITAMIN B-1: CPT

## 2020-01-21 PROCEDURE — 99214 OFFICE O/P EST MOD 30 MIN: CPT | Performed by: NURSE PRACTITIONER

## 2020-01-21 PROCEDURE — 83735 ASSAY OF MAGNESIUM: CPT

## 2020-01-21 PROCEDURE — 84134 ASSAY OF PREALBUMIN: CPT

## 2020-01-21 PROCEDURE — 83970 ASSAY OF PARATHORMONE: CPT

## 2020-01-21 PROCEDURE — 84630 ASSAY OF ZINC: CPT

## 2020-01-21 PROCEDURE — 80053 COMPREHEN METABOLIC PANEL: CPT

## 2020-01-21 PROCEDURE — 93270 REMOTE 30 DAY ECG REV/REPORT: CPT

## 2020-01-21 PROCEDURE — 83540 ASSAY OF IRON: CPT

## 2020-01-21 PROCEDURE — 93000 ELECTROCARDIOGRAM COMPLETE: CPT | Performed by: NURSE PRACTITIONER

## 2020-01-21 PROCEDURE — 84590 ASSAY OF VITAMIN A: CPT

## 2020-01-21 PROCEDURE — 84100 ASSAY OF PHOSPHORUS: CPT

## 2020-01-21 PROCEDURE — 36415 COLL VENOUS BLD VENIPUNCTURE: CPT

## 2020-01-21 PROCEDURE — 84446 ASSAY OF VITAMIN E: CPT

## 2020-01-21 PROCEDURE — 84597 ASSAY OF VITAMIN K: CPT

## 2020-01-21 PROCEDURE — 93005 ELECTROCARDIOGRAM TRACING: CPT | Performed by: NURSE PRACTITIONER

## 2020-01-21 PROCEDURE — 82746 ASSAY OF FOLIC ACID SERUM: CPT

## 2020-01-21 PROCEDURE — 82728 ASSAY OF FERRITIN: CPT

## 2020-01-23 LAB — ZINC SERPL-MCNC: 72 UG/DL (ref 56–134)

## 2020-01-23 NOTE — PROGRESS NOTES
Cardiology Office Follow Up Visit      Primary Care Provider:  Michelle Narayanan MD    Reason for f/u:     Palpitations  Near syncope  Dyspnea  Ventricular tachycardia      Subjective     CC:    Increasing palpitations associated with near syncope    History of Present Illness       Alyson Lew is a 34 y.o. female.  She was initially evaluated in our office back in October 2019 with complaints of dizziness palpitations and near syncope.  Cardiac evaluation at that time include a 2D echo which revealed her EF to be 65% with no significant valvular flow abnormalities.  It was essentially normal.  A 24-hour Holter monitor was placed which showed sinus rhythm primarily.  She had occasional PVCs there was an episode of wide-complex tachycardia with a rate of 158 lasting 18 beats in duration.  It was associated with symptoms on the part of the patient.  She went on to have cardiac catheterization which showed normal coronary anatomy and preserved LV function.    She presents back today for follow-up with complaints of persistent and worsening palpitations associated with dizziness lightheadedness and dyspnea.  There is no aggravating or relieving factors.  There is no particular time that these are more likely to happen.        Past Medical History:   Diagnosis Date   • Allergic    • Anemia    • Arthritis    • Asthma    • Cancer (CMS/HCC)     cervical 2008   • Chronic constipation    • Chronic pain disorder    • Depression    • Extremity pain    • Fibromyalgia, primary    • Headache    • Injury of back    • Joint pain    • Low back pain    • Neck pain    • Peripheral neuropathy    • PTSD (post-traumatic stress disorder)    • Shortness of breath    • Stroke (CMS/HCC)     Mini heat stroke   • Tachycardia        Past Surgical History:   Procedure Laterality Date   • CARDIAC CATHETERIZATION N/A 11/5/2019    Procedure: Coronary angiography;  Surgeon: Leo Fagan MD;  Location: [facility] CATH INVASIVE LOCATION;   Service: Cardiovascular   • CARDIAC CATHETERIZATION Left 2019    Procedure: Cardiac Catheterization/Vascular Study;  Surgeon: Leo Fagan MD;  Location:  [facility] CATH INVASIVE LOCATION;  Service: Cardiovascular   • CARDIAC CATHETERIZATION N/A 2019    Procedure: Left ventriculography;  Surgeon: Leo Fagan MD;  Location:  [facility] CATH INVASIVE LOCATION;  Service: Cardiovascular   •  SECTION      x2   • CHOLECYSTECTOMY     • ENDOSCOPY     • GASTRIC SLEEVE LAPAROSCOPIC     • HAND SURGERY     • TUBAL ABDOMINAL LIGATION           Current Outpatient Medications:   •  albuterol sulfate HFA (PROAIR HFA) 108 (90 Base) MCG/ACT inhaler, Inhale 2 puffs Every 4 (Four) Hours As Needed for Wheezing., Disp: 1 inhaler, Rfl: 1  •  fluticasone (FLONASE) 50 MCG/ACT nasal spray, 2 sprays into the nostril(s) as directed by provider Daily., Disp: 1 bottle, Rfl: 2  •  levothyroxine (LEVO-T) 75 MCG tablet, Take 1 tablet by mouth Daily., Disp: 30 tablet, Rfl: 1  •  linaclotide (LINZESS) 145 MCG capsule capsule, Take 1 capsule by mouth Every Morning Before Breakfast., Disp: 30 capsule, Rfl:   •  LYRICA 200 MG capsule, Take 1 capsule by mouth 3 (Three) Times a Day., Disp: 90 capsule, Rfl: 2  •  Morphine (MS CONTIN) 30 MG 12 hr tablet, Take 1 tablet by mouth 2 (Two) Times a Day., Disp: 60 tablet, Rfl: 0  •  naproxen (NAPROSYN) 500 MG tablet, TAKE ONE (1) TABLET BY MOUTH EVERY TWELVE HOURS WITH FOOD OR MILK AS NEEDED, Disp: , Rfl: 0  •  omeprazole (PrilOSEC) 40 MG capsule, Take 1 capsule by mouth Daily., Disp: 30 capsule, Rfl: 6  •  Pediatric Multiple Vit-C-FA (FLINSTONES GUMMIES OMEGA-3 DHA) chewable tablet, FLINSTONES GUMMIES OMEGA-3 DHA CHEW, Disp: , Rfl:   •  traZODone (DESYREL) 150 MG tablet, Take 1 tablet by mouth Every Night., Disp: 30 tablet, Rfl: 0    Social History     Socioeconomic History   • Marital status:      Spouse name: Not on file   • Number of children: Not on file   • Years of education: Not on file  "  • Highest education level: Not on file   Tobacco Use   • Smoking status: Former Smoker     Types: Cigarettes     Last attempt to quit: 2006     Years since quittin.0   • Smokeless tobacco: Never Used   • Tobacco comment: 1 cigarette every 3-4 days   Substance and Sexual Activity   • Alcohol use: No     Frequency: Never   • Drug use: No   • Sexual activity: Defer       Family History   Problem Relation Age of Onset   • No Known Problems Mother    • Cancer Father    • Coronary artery disease Father    • Diabetes Father    • Stroke Father    • Heart attack Father    • Mental illness Brother    • Mental illness Son        The following portions of the patient's history were reviewed and updated as appropriate: allergies, current medications, past family history, past medical history, past social history, past surgical history and problem list.    Review of Systems   Constitution: Negative for decreased appetite and diaphoresis.   HENT: Negative for congestion, hearing loss and nosebleeds.    Cardiovascular: Positive for dyspnea on exertion, irregular heartbeat, near-syncope and palpitations. Negative for chest pain, claudication, leg swelling, orthopnea, paroxysmal nocturnal dyspnea and syncope.   Respiratory: Negative for cough, shortness of breath and sleep disturbances due to breathing.    Endocrine: Negative for polyuria.   Hematologic/Lymphatic: Does not bruise/bleed easily.   Skin: Negative for itching and rash.   Musculoskeletal: Negative for back pain, muscle weakness and myalgias.   Gastrointestinal: Negative for abdominal pain, change in bowel habit and nausea.   Genitourinary: Negative for dysuria, flank pain, frequency and hesitancy.   Neurological: Negative for dizziness, tremors and weakness.   Psychiatric/Behavioral: Negative for altered mental status. The patient does not have insomnia.      BP 90/52   Pulse 58   Ht 160 cm (62.99\")   Wt 75.2 kg (165 lb 12.8 oz)   BMI 29.38 kg/m² .  Objective "     Physical Exam   Constitutional: She is oriented to person, place, and time. She appears well-developed and well-nourished. No distress.   HENT:   Head: Normocephalic and atraumatic.   Eyes: Pupils are equal, round, and reactive to light.   Neck: Normal range of motion. Neck supple. No JVD present.   Cardiovascular: Normal rate, regular rhythm, S1 normal, S2 normal, normal heart sounds and intact distal pulses.   No murmur heard.  Pulmonary/Chest: Effort normal and breath sounds normal.   Abdominal: Soft. Normal appearance. She exhibits no distension. There is no tenderness.   Musculoskeletal: Normal range of motion. She exhibits no edema.   Neurological: She is alert and oriented to person, place, and time.   Skin: Skin is warm and dry.   Psychiatric: She has a normal mood and affect.           ECG 12 Lead  Date/Time: 1/21/2020 2:53 PM  Performed by: Krys Azar APRN  Authorized by: Krys Azar APRN   Comparison: compared with previous ECG from 10/15/2020  Rhythm: sinus bradycardia  Rate: normal  BPM: 58  QRS axis: normal    Clinical impression: non-specific ECG                     Diagnoses and all orders for this visit:    1. Ventricular tachycardia (paroxysmal) (CMS/HCC) (Primary)  Comments:  Nonsustained ventricular tachycardia noted on previous Holter monitor lasting 18 beats.  Echocardiogram essentially normal and cardiac catheterization with no s  Orders:  -     ECG 12 Lead; Future  -     Cardiac Event Monitor; Future  -     Ambulatory Referral to Cardiac Electrophysiology    2. Palpitations  Comments:  Persistent complaints of palpitations associated with feelings of dizziness lightheadedness near syncope and shortness of breath    3. Near syncope  Comments:  Associated with palpitations    4. SOB (shortness of breath)  Comments:  No overt signs and symptoms of heart failure on exam.                Plan:  Have advised patient to stop caffeine intake  Place 30-day event monitor  Would refer to  EP for evaluation of palpitations, evidence of wide-complex tachycardia by Holter monitor    Return to clinic for follow-up with Dr. Fagan after EP evaluation complete.

## 2020-01-24 LAB
A-TOCOPHEROL VIT E SERPL-MCNC: 8.1 MG/L (ref 5.9–19.4)
GAMMA TOCOPHEROL SERPL-MCNC: 1.4 MG/L (ref 0.7–4.9)
Lab: NORMAL
METHYLMALONATE SERPL-SCNC: 172 NMOL/L (ref 0–378)
VIT B1 BLD-SCNC: 110.5 NMOL/L (ref 66.5–200)

## 2020-01-25 LAB — VIT A SERPL-MCNC: 36.4 UG/DL (ref 18.9–57.3)

## 2020-01-27 ENCOUNTER — CONSULT (OUTPATIENT)
Dept: CARDIOLOGY | Facility: CLINIC | Age: 35
End: 2020-01-27

## 2020-01-27 ENCOUNTER — PREP FOR SURGERY (OUTPATIENT)
Dept: OTHER | Facility: HOSPITAL | Age: 35
End: 2020-01-27

## 2020-01-27 VITALS
BODY MASS INDEX: 28.85 KG/M2 | WEIGHT: 162.8 LBS | HEART RATE: 83 BPM | SYSTOLIC BLOOD PRESSURE: 106 MMHG | DIASTOLIC BLOOD PRESSURE: 63 MMHG

## 2020-01-27 DIAGNOSIS — R55 NEAR SYNCOPE: ICD-10-CM

## 2020-01-27 DIAGNOSIS — I47.29 VENTRICULAR TACHYCARDIA (PAROXYSMAL) (HCC): Primary | ICD-10-CM

## 2020-01-27 DIAGNOSIS — R42 DIZZINESS: ICD-10-CM

## 2020-01-27 DIAGNOSIS — R00.2 PALPITATIONS: ICD-10-CM

## 2020-01-27 LAB — PHYTONADIONE SERPL-MCNC: <.13 NG/ML (ref 0.13–1.88)

## 2020-01-27 PROCEDURE — 99204 OFFICE O/P NEW MOD 45 MIN: CPT | Performed by: INTERNAL MEDICINE

## 2020-01-27 PROCEDURE — 93000 ELECTROCARDIOGRAM COMPLETE: CPT | Performed by: INTERNAL MEDICINE

## 2020-01-27 RX ORDER — SODIUM CHLORIDE 0.9 % (FLUSH) 0.9 %
3 SYRINGE (ML) INJECTION EVERY 12 HOURS SCHEDULED
Status: CANCELLED | OUTPATIENT
Start: 2020-01-27

## 2020-01-27 RX ORDER — SODIUM CHLORIDE 0.9 % (FLUSH) 0.9 %
10 SYRINGE (ML) INJECTION AS NEEDED
Status: CANCELLED | OUTPATIENT
Start: 2020-01-27

## 2020-01-27 NOTE — PROGRESS NOTES
CC--near syncope and wide QRS tachycardia    Sub--34-year-old female patient complains of sudden onset of palpitations and sudden offset of palpitations in the last few years and is being investigated by Dr. Fagan and was found to have wide QRS tachycardia during a Holter recording which prompted further cardiac evaluation and cardiac catheterization was within normal limits  She complains of ongoing symptoms for last 3 to 4 years can last for few minutes and sometimes as long as an hour  She does feel dizzy when that happens without any discomfort in her chest and denies any thomas syncope though she had spells of presyncope  She has several medical problems including prior history of obesity resolved with gastric sleeve and chronic fibromyalgia being managed by pain physician with long-acting morphine  She has history of acid reflux  Multiple other medical problems are listed in the past medical history  Father has some history of cardiovascular problem according to her without any clear description        Past Medical History:   Diagnosis Date   • Allergic    • Anemia    • Arthritis    • Asthma    • Cancer (CMS/HCC)     cervical 2008   • Chronic constipation    • Chronic pain disorder    • Depression    • Extremity pain    • Fibromyalgia, primary    • Headache    • Injury of back    • Joint pain    • Low back pain    • Neck pain    • Peripheral neuropathy    • PTSD (post-traumatic stress disorder)    • Shortness of breath    • Stroke (CMS/HCC)     Mini heat stroke   • Tachycardia      Past Surgical History:   Procedure Laterality Date   • CARDIAC CATHETERIZATION N/A 11/5/2019    Procedure: Coronary angiography;  Surgeon: Leo Fagan MD;  Location: [facility] CATH INVASIVE LOCATION;  Service: Cardiovascular   • CARDIAC CATHETERIZATION Left 11/5/2019    Procedure: Cardiac Catheterization/Vascular Study;  Surgeon: Leo Fagan MD;  Location: [facility] CATH INVASIVE LOCATION;  Service: Cardiovascular   • CARDIAC  CATHETERIZATION N/A 2019    Procedure: Left ventriculography;  Surgeon: Leo Fagan MD;  Location: [facility] INVASIVE LOCATION;  Service: Cardiovascular   •  SECTION      x2   • CHOLECYSTECTOMY     • ENDOSCOPY     • GASTRIC SLEEVE LAPAROSCOPIC     • HAND SURGERY     • TUBAL ABDOMINAL LIGATION       Family History   Problem Relation Age of Onset   • No Known Problems Mother    • Cancer Father    • Coronary artery disease Father    • Diabetes Father    • Stroke Father    • Heart attack Father    • Mental illness Brother    • Mental illness Son      Social History     Tobacco Use   • Smoking status: Former Smoker     Types: Cigarettes     Last attempt to quit: 2006     Years since quittin.0   • Smokeless tobacco: Never Used   • Tobacco comment: 1 cigarette every 3-4 days   Substance Use Topics   • Alcohol use: No     Frequency: Never   • Drug use: No       (Not in a hospital admission)  Allergies:  Adhesive tape and Latex    Review of Systems   General:  positive for fatigue and tiredness  Eyes: No redness  Cardiovascular: No chest pain, positive for  palpitations  Respiratory:   no shortness of breath  Gastrointestinal: No nausea or vomiting, bleeding  Genitourinary: no hematuria or dysuria  Musculoskeletal: No arthralgia or myalgia  Skin: No rash  Neurologic: No numbness, tingling, syncope  Hematologic/Lymphatic: No abnormal bleeding      Physical Exam  VITALS REVIEWED--blood pressure 106/63 pulse rate is 83 patient is afebrile respiration 12 times a minute  EKG shows sinus rhythm heart rate of 73 [place] interval 129 QRS of 110 QTC of 407 normal axis and no significant EKG changes compared to prior EKG and EKG indication includes wide QRS tachycardia and symptoms of palpitations      General:      well developed, well nourished, in no acute distress.    Head:      normocephalic and atraumatic.    Eyes:      PERRL/EOM intact, conjunctiva and sclera clear with out nystagmus.    Neck:      no masses,  thyromegaly, trachea central with normal respiratory effort  Lungs:      clear bilaterally to auscultation.    Heart:      non-displaced PMI, chest non-tender; regular rate and rhythm, S1, S2 without murmurs, rubs, or gallops  Skin:      intact without lesions or rashes.    Psych:      alert and cooperative; normal mood and affect; normal attention span and concentration.      Extremities without ankle edema and without any cyanosis or clubbing    Muscular skeletal patient walks with a cane with mild kyphosis    Neurological no focal deficits and alert oriented x3    Abdomen soft nontender no hepatomegaly          Assessment plan    Intermittent symptoms of palpitations and documented wide QRS tachycardia  Prior history of near syncope without any thomas syncope  Chronic pain syndrome  History of fibromyalgia    Recommendations    Patient recommended EP study to evaluate etiology of palpitations and wide QRS tachycardia and possible ablation for focal arrhythmias and risks, benefits and outcomes educated      Electronically signed by Luther Walden MD, 01/27/20, 3:07 PM.

## 2020-01-28 ENCOUNTER — HOSPITAL ENCOUNTER (OUTPATIENT)
Dept: PAIN MEDICINE | Facility: HOSPITAL | Age: 35
Discharge: HOME | End: 2020-01-28

## 2020-01-28 ENCOUNTER — HOSPITAL ENCOUNTER (OUTPATIENT)
Dept: PAIN MEDICINE | Facility: HOSPITAL | Age: 35
Discharge: HOME | End: 2020-01-28
Admitting: PHYSICAL MEDICINE & REHABILITATION

## 2020-01-28 VITALS
OXYGEN SATURATION: 100 % | HEIGHT: 63 IN | WEIGHT: 162 LBS | DIASTOLIC BLOOD PRESSURE: 66 MMHG | SYSTOLIC BLOOD PRESSURE: 102 MMHG | BODY MASS INDEX: 28.7 KG/M2 | RESPIRATION RATE: 16 BRPM | HEART RATE: 62 BPM | TEMPERATURE: 98.3 F

## 2020-01-28 DIAGNOSIS — R52 PAIN: ICD-10-CM

## 2020-01-28 DIAGNOSIS — M46.1 SACROILIITIS, NOT ELSEWHERE CLASSIFIED (HCC): Primary | ICD-10-CM

## 2020-01-28 PROCEDURE — 25010000002 METHYLPREDNISOLONE PER 40 MG

## 2020-01-28 PROCEDURE — 93010 ELECTROCARDIOGRAM REPORT: CPT | Performed by: INTERNAL MEDICINE

## 2020-01-28 PROCEDURE — 27096 INJECT SACROILIAC JOINT: CPT | Performed by: PHYSICAL MEDICINE & REHABILITATION

## 2020-01-28 PROCEDURE — 77003 FLUOROGUIDE FOR SPINE INJECT: CPT

## 2020-01-28 RX ORDER — BUPIVACAINE HYDROCHLORIDE 5 MG/ML
INJECTION, SOLUTION EPIDURAL; INTRACAUDAL
Status: DISPENSED
Start: 2020-01-28 | End: 2020-01-28

## 2020-01-28 RX ORDER — METHYLPREDNISOLONE ACETATE 40 MG/ML
INJECTION, SUSPENSION INTRA-ARTICULAR; INTRALESIONAL; INTRAMUSCULAR; SOFT TISSUE
Status: DISPENSED
Start: 2020-01-28 | End: 2020-01-28

## 2020-01-28 NOTE — H&P
"      Patient Care Team:  Michelle Naraynaan MD as PCP - General    Chief complaint Low back pain    Subjective     all over pain, especially back, neck and bilateral hip pain, right knee. Dz with fibromyalgia and inflammatory arthritis by Rheum, on DMARDs and Lyrica 100mg BID at initial visit with poor control, pain worsening, 10/10 at worst, 7/10 at best, always present, prevents work and housework, burning, sharp, varies. Prior notes reviewed, referred for worsening pain, does not have time for PT. Increased to Lyrica 100mg BID then TID, started Celebrex, Cymbalta 30mg then 60mg qHS, fewer flares but still severe fibromyalgia pain, also LBP and b/l hip pain. Started Norco 5/325mg, mostly taking qHS, some days BID with worsening pain with cold weather, becoming less effective, rotated to Percocet 5/325mg. C/o insomnia. Went to ED with severe pain not controlled with Percocet 5/325mg BID prn, now QID prn. Had gastric sleeve surgery, liquid Norco worked well, restarted Percocet with severe N/V, vomited up complete prescription. Started liquid Norco, working better, but pain worsening, having difficulty with ADLs, increased Norco and Lyrica, then MS-IR 15mg QID prn with pill . Worsening L \"hip\" pain, insomnia, constipation. Requesting NSAID. Had toni. Has FH of autoimmune disorders and MS, hasn't heard from Rheum. Needs PT before having injection, worsening SIJ pain even with PT. Here for b/l SIJ injection, denies allergy to iodine or contrast.      Review of Systems     Past Medical History:   Diagnosis Date   • Allergic    • Anemia    • Arthritis    • Asthma    • Cancer (CMS/[facility])     cervical 2008   • Chronic constipation    • Chronic pain disorder    • Depression    • Extremity pain    • Fibromyalgia, primary    • Headache    • Injury of back    • Joint pain    • Low back pain    • Neck pain    • Peripheral neuropathy    • PTSD (post-traumatic stress disorder)    • Shortness of breath    • Stroke " (CMS/[facility])     Mini heat stroke   • Tachycardia      Past Surgical History:   Procedure Laterality Date   • CARDIAC CATHETERIZATION N/A 2019    Procedure: Coronary angiography;  Surgeon: Leo Fagan MD;  Location: [facility] CATH INVASIVE LOCATION;  Service: Cardiovascular   • CARDIAC CATHETERIZATION Left 2019    Procedure: Cardiac Catheterization/Vascular Study;  Surgeon: Leo Fagan MD;  Location: [facility] CATH INVASIVE LOCATION;  Service: Cardiovascular   • CARDIAC CATHETERIZATION N/A 2019    Procedure: Left ventriculography;  Surgeon: Leo Fagan MD;  Location: [facility] CATH INVASIVE LOCATION;  Service: Cardiovascular   •  SECTION      x2   • CHOLECYSTECTOMY     • ENDOSCOPY     • GASTRIC SLEEVE LAPAROSCOPIC     • HAND SURGERY     • TUBAL ABDOMINAL LIGATION       Family History   Problem Relation Age of Onset   • No Known Problems Mother    • Cancer Father    • Coronary artery disease Father    • Diabetes Father    • Stroke Father    • Heart attack Father    • Mental illness Brother    • Mental illness Son      Social History     Tobacco Use   • Smoking status: Former Smoker     Types: Cigarettes     Last attempt to quit: 2006     Years since quittin.0   • Smokeless tobacco: Never Used   • Tobacco comment: 1 cigarette every 3-4 days   Substance Use Topics   • Alcohol use: No     Frequency: Never   • Drug use: No       (Not in a hospital admission)  Allergies:  Adhesive tape and Latex    Objective      Vital Signs  Temp:  [98.3 °F (36.8 °C)] 98.3 °F (36.8 °C)  Heart Rate:  [57-83] 57  Resp:  [16] 16  BP: (106-122)/(63-77) 122/77    Physical Exam    Results Review:   None      Assessment/Plan       * No active hospital problems. *      ICD-10-CM ICD-9-CM   1. Sacroiliitis, not elsewhere classified (CMS/[facility]) M46.1 720.2         Assessment & Plan    I discussed the patients findings and my recommendations with patient     INspect reviewed, in order. Low risk. Repeat UDS 19 in  order.  Increased to Lyrica 200mg TID, sedating, numbs her lips, but benefit outweighs side effects.  Cont Cymbalta 60mg qHS, helping, and sleeping better.  Stopped Norco 5/325mg, failed Percocet 5/325mg, started liquid Norco 7.5/325mg/15ml 15ml QID prn, #1800. Increased to q4h prn, #2700, no longer working. Increased to MS-IR 15mg q6h prn, has pill , helping a lot more.  Failed Mobic 7.5mg qd - BID prn, failed Celebrex.  Cont Silenor.  Cont Relistor 450mg qdaily, failed Miralax, colace.  Cont Precision compounded cream, helping.  New referral to Rheum.  Referral to PT for LBP and L hip pain.  Perform b/l SIJ injection.  RTC in 3 months for f/u.        Sacroiliac Joint Injection    PREOPERATIVE DIAGNOSIS: Sacroilitis    POSTOPERATIVE DIAGNOSIS: Sacroilitis    PROCEDURE PERFORMED:  BILATERAL SACROILIAC JOINT INJECTION    The patient understands the risks and benefits of the procedure and wishes to proceed. The patient was seen in the preoperative area. Patient's consent was obtained and updated. Vitals were taken. Patient was then brought to the procedure suite and placed in prone position for a sacroiliac joint injection. The appropriate anatomic area was widely prepped with Chloraprep and draped in a sterile fashion. Under fluoroscopic guidance using a contralateral oblique view, a 25 gauge curved tip spinal needle was passed through skin anesthetized with 1% Lidocaine without epinephrine. The needle tip was guided to the lower pole of the joint using fluoroscopy. 1mL of preservative free contrast was injected into the joint to confirm location. A clear outline was obtained and 3 mL of steroid solution containing 2 mL 0.25% bupivacaine, and 1mL 40mg Depomedrol was injected in total.  The procedure was repeated in all respects on the opposite side. The patient tolerated with no toño-procedural complications.  A sterile dressing was placed over the puncture sites.      Espinoza Rivero MD  01/28/20  10:57  AM    Time: Discharge 15 min

## 2020-01-28 NOTE — PATIENT INSTRUCTIONS
Sacroiliac Joint Injection, Care After  This sheet gives you information about how to care for yourself after your procedure. Your health care provider may also give you more specific instructions. If you have problems or questions, contact your health care provider.  What can I expect after the procedure?  After the procedure, it is common to have bruising or soreness at the injection site.  Follow these instructions at home:  Managing pain and swelling         · Keep a record of your pain (pain log) to share with your health care provider at your follow-up visit. If a long-acting anti-inflammatory medicine (steroid) was included in your injection, you may not notice an improvement in your pain level for a few days.  · Take over-the-counter and prescription medicines only as told by your health care provider.  · Do not use a heating pad and do not apply heat directly to the area after the procedure.  · Ask your health care provider about using cold therapy.  · If directed, put ice on the affected area.  ? Put ice in a plastic bag.  ? Place a towel between your skin and the bag.  ? Leave the ice on for 20 minutes, 2-3 times a day. Do not use cold therapy for more than 24 hours.  · Check your injection site every day for signs of infection. Check for:  ? Redness, swelling, or pain.  ? Fluid or blood.  ? Warmth.  ? Pus or a bad smell.  Activity  · Rest the day of your injection. Avoid doing a lot of activity on the day of the procedure.  · Avoid any activities that take a lot of effort for 24 hours after the injection.  · Return to your normal activities as told by your health care provider. Ask your health care provider what activities are safe for you.  · Do physical therapy exercises as told by your health care provider or physical therapist. These exercises are used to strengthen muscles surrounding the SI joint, and that will help to relieve pain.  General instructions  · If dye was used during your procedure,  drink plenty of water to flush the dye out of your body.  · Do not take baths, swim, or use a hot tub until your health care provider approves. You may take showers.  · Do not drive for 24 hours if you were given a medicine to help you relax (sedative) during your procedure.  · Do not use any products that contain nicotine or tobacco, such as cigarettes and e-cigarettes. If you need help quitting, ask your health care provider.  · Keep all follow-up visits as told by your health care provider. This is important.  Contact a health care provider if:  · Your pain does not improve or it gets worse.  · You have numbness, tingling, or weakness.  · You have a fever.  · You have redness, swelling, or pain around your injection site.  · You have fluid or blood coming from your injection site.  · Your injection site feels warm to the touch.  · You have pus or a bad smell coming from your injection site.  Get help right away if:  · You have chest pain or shortness of breath.  Summary  · After the procedure, it is common to have bruising or soreness at the injection site.  · Keep a record of your pain (pain log) to share with your health care provider at your follow-up visit.  · Return to your normal activities as told by your health care provider. Ask your health care provider what activities are safe for you.  · Contact your health care provider if you have pain that is getting worse, weakness, numbness, or any sign of infection at your injection site.  This information is not intended to replace advice given to you by your health care provider. Make sure you discuss any questions you have with your health care provider.  Document Released: 09/24/2018 Document Revised: 09/24/2018 Document Reviewed: 09/24/2018  [employer] Interactive Patient Education © 2019 [employer]

## 2020-01-30 ENCOUNTER — TELEPHONE (OUTPATIENT)
Dept: BARIATRICS/WEIGHT MGMT | Facility: CLINIC | Age: 35
End: 2020-01-30

## 2020-01-30 NOTE — TELEPHONE ENCOUNTER
Spoke with patient and she prefers that we call in vitamin for her please.  Giuliano/Guido      What about just having her buy our vitamins?

## 2020-01-30 NOTE — TELEPHONE ENCOUNTER
----- Message from Tea Russell MD sent at 1/28/2020  4:27 PM EST -----  Please let her know she has low Vit K, ferritin (iron deficiency), hgb (anemia), and prealbumin (low protein). She needs to take a vitamin with iron and eat more protein. If she does not have a vitamin with iron, I can order one.

## 2020-02-04 ENCOUNTER — TELEPHONE (OUTPATIENT)
Dept: CARDIOLOGY | Facility: CLINIC | Age: 35
End: 2020-02-04

## 2020-02-04 ENCOUNTER — OFFICE VISIT (OUTPATIENT)
Dept: PODIATRY | Facility: CLINIC | Age: 35
End: 2020-02-04

## 2020-02-04 VITALS
HEART RATE: 49 BPM | BODY MASS INDEX: 28.49 KG/M2 | DIASTOLIC BLOOD PRESSURE: 58 MMHG | WEIGHT: 160.8 LBS | SYSTOLIC BLOOD PRESSURE: 94 MMHG | HEIGHT: 63 IN

## 2020-02-04 DIAGNOSIS — M66.271 SPONTANEOUS RUPTURE OF EXTENSOR TENDON OF RIGHT FOOT: ICD-10-CM

## 2020-02-04 DIAGNOSIS — S96.921A FOOT LACERATION INVOLVING TENDON, RIGHT, INITIAL ENCOUNTER: Primary | ICD-10-CM

## 2020-02-04 DIAGNOSIS — S91.311A FOOT LACERATION INVOLVING TENDON, RIGHT, INITIAL ENCOUNTER: Primary | ICD-10-CM

## 2020-02-04 PROCEDURE — 99213 OFFICE O/P EST LOW 20 MIN: CPT | Performed by: PODIATRIST

## 2020-02-04 NOTE — TELEPHONE ENCOUNTER
Patient is set to have an ablation of feb 11 th , patient needs to have right foot hallucis tendon with possible autograft of plantaris tendon they are wondering how long she has to wait after the ablation to get this surgery

## 2020-02-04 NOTE — PROGRESS NOTES
"02/04/2020  Foot and Ankle Surgery - Established Patient/Follow-up  Provider: Dr. Adeel Rajan DPM  Location: [facility]    Subjective:  Alyson Lew is a 34 y.o. female.     Chief Complaint   Patient presents with   • Right Foot - Follow-up       HPI: Patient returns several weeks after laceration to the dorsal aspect of her right foot.  She did have her MRI.  She continues to have weakness and difficulty with weightbearing activities.  She complains of pain along the dorsal aspect of the foot and the inability to move her big toe.    Allergies   Allergen Reactions   • Adhesive Tape Hives   • Latex Hives and Unknown - High Severity       Current Outpatient Medications on File Prior to Visit   Medication Sig Dispense Refill   • albuterol sulfate HFA (PROAIR HFA) 108 (90 Base) MCG/ACT inhaler Inhale 2 puffs Every 4 (Four) Hours As Needed for Wheezing. 1 inhaler 1   • fluticasone (FLONASE) 50 MCG/ACT nasal spray 2 sprays into the nostril(s) as directed by provider Daily. 1 bottle 2   • levothyroxine (LEVO-T) 75 MCG tablet Take 1 tablet by mouth Daily. 30 tablet 1   • linaclotide (LINZESS) 145 MCG capsule capsule Take 1 capsule by mouth Every Morning Before Breakfast. 30 capsule    • LYRICA 200 MG capsule Take 1 capsule by mouth 3 (Three) Times a Day. 90 capsule 2   • Morphine (MS CONTIN) 30 MG 12 hr tablet Take 1 tablet by mouth 2 (Two) Times a Day. 60 tablet 0   • naproxen (NAPROSYN) 500 MG tablet TAKE ONE (1) TABLET BY MOUTH EVERY TWELVE HOURS WITH FOOD OR MILK AS NEEDED  0   • omeprazole (PrilOSEC) 40 MG capsule Take 1 capsule by mouth Daily. 30 capsule 6   • Pediatric Multiple Vit-C-FA (FLINSTONES GUMMIES OMEGA-3 DHA) chewable tablet FLINSTONES GUMMIES OMEGA-3 DHA CHEW     • traZODone (DESYREL) 150 MG tablet Take 1 tablet by mouth Every Night. 30 tablet 0     No current facility-administered medications on file prior to visit.        Objective   BP 94/58   Pulse (!) 49   Ht 160 cm (63\")   Wt " 72.9 kg (160 lb 12.8 oz)   BMI 28.48 kg/m²      General:   Appearance: appears stated age and healthy    Orientation: AAOx3    Affect: appropriate    Gait: antalgic        Right Foot/Ankle:       Vascular   Dorsalis pedis:  2+  Posterior tibial:  2+  Skin Temperature: warm    Edema Grading:  None  CFT:  < 3 seconds  Pedal Hair Growth:  Present      Neurologic   Right ankle sensation: Allodynia and paresthesia noted to the laceration site.  Vibratory sensation:  Normal  Hot/Cold sensation: normal    Protective Sensation using [place]-Jigna Monofilament:  10  Achilles reflex:  2+      Musculoskeletal   Ecchymosis:  None  Tenderness: dorsal foot    Arch:  Normal      Range of Motion   Foot and ankle ROM within normal limits        Dermatologic   Skin comment:   Laceration is well-healed.  No signs of infection      Additional Comments Muscle strength is diminished across the EHL tendon.  Reducible mallet digit deformity involving the great toe    Assessment/Plan   Alyson was seen today for follow-up.    Diagnoses and all orders for this visit:    Foot laceration involving tendon, right, initial encounter  -     CBC (No Diff); Future  -     Basic Metabolic Panel; Future  -     ECG 12 Lead; Future  -     XR Chest 2 View; Future  -     Case Request; Standing  -     Case Request    Spontaneous rupture of extensor tendon of right foot  -     CBC (No Diff); Future  -     Basic Metabolic Panel; Future  -     ECG 12 Lead; Future  -     XR Chest 2 View; Future  -     Case Request; Standing  -     Case Request    Other orders  -     Follow Anesthesia Guidelines / Standing Orders; Future  -     Obtain Informed Consent; Future  -     Provide NPO Instructions to Patient; Future  -     Chlorhexidine Skin Prep; Future      Patient returns several weeks after injury and MRI for evaluation of her right foot.  She states that she has had issues with transportation to get in sooner.  She continues to have pain involving the dorsal  aspect of her foot.  MRI was reviewed and discussed with her at length.  Findings are consistent with EHL rupture and retraction.  We did review treatment options and I have recommended that she consider tendon repair with allograft or possible autograft of the plantaris tendon.  We did discuss the procedure, risks, goals, and recovery at length.  She would like to proceed with allograft if possible.  She states that she has a cardiac ablation scheduled in the near future.  Surgery is pending cardiac clearance.  I have asked her to call with any additional questions or concerns.  She does understand that she will require strict nonweightbearing activity after surgery.    Orders Placed This Encounter   Procedures   • XR Chest 2 View     Standing Status:   Future     Standing Expiration Date:   2/4/2021     Order Specific Question:   Reason for Exam:     Answer:   Preop     Order Specific Question:   Patient Pregnant     Answer:   No   • CBC (No Diff)     Standing Status:   Future     Standing Expiration Date:   2/4/2021   • Basic Metabolic Panel     Standing Status:   Future     Standing Expiration Date:   2/4/2021   • Follow Anesthesia Guidelines / Standing Orders     Standing Status:   Future   • Obtain Informed Consent     Standing Status:   Future     Order Specific Question:   Informed Consent Given For     Answer:   Extensor hallucis longus tendon repair with allograft versus possible plantaris tendon autograft.   • Provide NPO Instructions to Patient     Standing Status:   Future   • Chlorhexidine Skin Prep     Chlorhexidine Skin Prep and Instructions For All Patients Having A Procedure Requiring an Outward Incision if Not Allergic. If Allergic, Give Antibacterial Skin Wipes and Instructions. Do Not Use For Facial Cases or on Any Mucus Membranes.     Standing Status:   Future   • ECG 12 Lead     Standing Status:   Future     Standing Expiration Date:   2/4/2021     Order Specific Question:   Reason for Exam:      Answer:   Preop          Note is dictated utilizing voice recognition software. Unfortunately this leads to occasional typographical errors. I apologize in advance if the situation occurs. If questions occur please do not hesitate to call our office.

## 2020-02-04 NOTE — H&P (VIEW-ONLY)
"02/04/2020  Foot and Ankle Surgery - Established Patient/Follow-up  Provider: Dr. Adeel Rajan DPM  Location: [facility]    Subjective:  Alyson Lew is a 34 y.o. female.     Chief Complaint   Patient presents with   • Right Foot - Follow-up       HPI: Patient returns several weeks after laceration to the dorsal aspect of her right foot.  She did have her MRI.  She continues to have weakness and difficulty with weightbearing activities.  She complains of pain along the dorsal aspect of the foot and the inability to move her big toe.    Allergies   Allergen Reactions   • Adhesive Tape Hives   • Latex Hives and Unknown - High Severity       Current Outpatient Medications on File Prior to Visit   Medication Sig Dispense Refill   • albuterol sulfate HFA (PROAIR HFA) 108 (90 Base) MCG/ACT inhaler Inhale 2 puffs Every 4 (Four) Hours As Needed for Wheezing. 1 inhaler 1   • fluticasone (FLONASE) 50 MCG/ACT nasal spray 2 sprays into the nostril(s) as directed by provider Daily. 1 bottle 2   • levothyroxine (LEVO-T) 75 MCG tablet Take 1 tablet by mouth Daily. 30 tablet 1   • linaclotide (LINZESS) 145 MCG capsule capsule Take 1 capsule by mouth Every Morning Before Breakfast. 30 capsule    • LYRICA 200 MG capsule Take 1 capsule by mouth 3 (Three) Times a Day. 90 capsule 2   • Morphine (MS CONTIN) 30 MG 12 hr tablet Take 1 tablet by mouth 2 (Two) Times a Day. 60 tablet 0   • naproxen (NAPROSYN) 500 MG tablet TAKE ONE (1) TABLET BY MOUTH EVERY TWELVE HOURS WITH FOOD OR MILK AS NEEDED  0   • omeprazole (PrilOSEC) 40 MG capsule Take 1 capsule by mouth Daily. 30 capsule 6   • Pediatric Multiple Vit-C-FA (FLINSTONES GUMMIES OMEGA-3 DHA) chewable tablet FLINSTONES GUMMIES OMEGA-3 DHA CHEW     • traZODone (DESYREL) 150 MG tablet Take 1 tablet by mouth Every Night. 30 tablet 0     No current facility-administered medications on file prior to visit.        Objective   BP 94/58   Pulse (!) 49   Ht 160 cm (63\")   Wt " 72.9 kg (160 lb 12.8 oz)   BMI 28.48 kg/m²      General:   Appearance: appears stated age and healthy    Orientation: AAOx3    Affect: appropriate    Gait: antalgic        Right Foot/Ankle:       Vascular   Dorsalis pedis:  2+  Posterior tibial:  2+  Skin Temperature: warm    Edema Grading:  None  CFT:  < 3 seconds  Pedal Hair Growth:  Present      Neurologic   Right ankle sensation: Allodynia and paresthesia noted to the laceration site.  Vibratory sensation:  Normal  Hot/Cold sensation: normal    Protective Sensation using [place]-Jigna Monofilament:  10  Achilles reflex:  2+      Musculoskeletal   Ecchymosis:  None  Tenderness: dorsal foot    Arch:  Normal      Range of Motion   Foot and ankle ROM within normal limits        Dermatologic   Skin comment:   Laceration is well-healed.  No signs of infection      Additional Comments Muscle strength is diminished across the EHL tendon.  Reducible mallet digit deformity involving the great toe    Assessment/Plan   Alyson was seen today for follow-up.    Diagnoses and all orders for this visit:    Foot laceration involving tendon, right, initial encounter  -     CBC (No Diff); Future  -     Basic Metabolic Panel; Future  -     ECG 12 Lead; Future  -     XR Chest 2 View; Future  -     Case Request; Standing  -     Case Request    Spontaneous rupture of extensor tendon of right foot  -     CBC (No Diff); Future  -     Basic Metabolic Panel; Future  -     ECG 12 Lead; Future  -     XR Chest 2 View; Future  -     Case Request; Standing  -     Case Request    Other orders  -     Follow Anesthesia Guidelines / Standing Orders; Future  -     Obtain Informed Consent; Future  -     Provide NPO Instructions to Patient; Future  -     Chlorhexidine Skin Prep; Future      Patient returns several weeks after injury and MRI for evaluation of her right foot.  She states that she has had issues with transportation to get in sooner.  She continues to have pain involving the dorsal  aspect of her foot.  MRI was reviewed and discussed with her at length.  Findings are consistent with EHL rupture and retraction.  We did review treatment options and I have recommended that she consider tendon repair with allograft or possible autograft of the plantaris tendon.  We did discuss the procedure, risks, goals, and recovery at length.  She would like to proceed with allograft if possible.  She states that she has a cardiac ablation scheduled in the near future.  Surgery is pending cardiac clearance.  I have asked her to call with any additional questions or concerns.  She does understand that she will require strict nonweightbearing activity after surgery.    Orders Placed This Encounter   Procedures   • XR Chest 2 View     Standing Status:   Future     Standing Expiration Date:   2/4/2021     Order Specific Question:   Reason for Exam:     Answer:   Preop     Order Specific Question:   Patient Pregnant     Answer:   No   • CBC (No Diff)     Standing Status:   Future     Standing Expiration Date:   2/4/2021   • Basic Metabolic Panel     Standing Status:   Future     Standing Expiration Date:   2/4/2021   • Follow Anesthesia Guidelines / Standing Orders     Standing Status:   Future   • Obtain Informed Consent     Standing Status:   Future     Order Specific Question:   Informed Consent Given For     Answer:   Extensor hallucis longus tendon repair with allograft versus possible plantaris tendon autograft.   • Provide NPO Instructions to Patient     Standing Status:   Future   • Chlorhexidine Skin Prep     Chlorhexidine Skin Prep and Instructions For All Patients Having A Procedure Requiring an Outward Incision if Not Allergic. If Allergic, Give Antibacterial Skin Wipes and Instructions. Do Not Use For Facial Cases or on Any Mucus Membranes.     Standing Status:   Future   • ECG 12 Lead     Standing Status:   Future     Standing Expiration Date:   2/4/2021     Order Specific Question:   Reason for Exam:      Answer:   Preop          Note is dictated utilizing voice recognition software. Unfortunately this leads to occasional typographical errors. I apologize in advance if the situation occurs. If questions occur please do not hesitate to call our office.

## 2020-02-10 ENCOUNTER — ANESTHESIA EVENT (OUTPATIENT)
Dept: CARDIOLOGY | Facility: HOSPITAL | Age: 35
End: 2020-02-10

## 2020-02-11 ENCOUNTER — ANESTHESIA (OUTPATIENT)
Dept: CARDIOLOGY | Facility: HOSPITAL | Age: 35
End: 2020-02-11

## 2020-02-11 ENCOUNTER — HOSPITAL ENCOUNTER (OUTPATIENT)
Facility: HOSPITAL | Age: 35
Setting detail: HOSPITAL OUTPATIENT SURGERY
Discharge: HOME | End: 2020-02-11
Attending: INTERNAL MEDICINE | Admitting: INTERNAL MEDICINE

## 2020-02-11 VITALS
DIASTOLIC BLOOD PRESSURE: 48 MMHG | RESPIRATION RATE: 18 BRPM | WEIGHT: 158.73 LBS | TEMPERATURE: 98.1 F | OXYGEN SATURATION: 97 % | BODY MASS INDEX: 26.45 KG/M2 | HEIGHT: 65 IN | HEART RATE: 87 BPM | SYSTOLIC BLOOD PRESSURE: 90 MMHG

## 2020-02-11 DIAGNOSIS — I47.29 VENTRICULAR TACHYCARDIA (PAROXYSMAL) (HCC): ICD-10-CM

## 2020-02-11 DIAGNOSIS — M66.271 SPONTANEOUS RUPTURE OF EXTENSOR TENDON OF RIGHT FOOT: ICD-10-CM

## 2020-02-11 DIAGNOSIS — R00.2 PALPITATIONS: ICD-10-CM

## 2020-02-11 DIAGNOSIS — S91.311A FOOT LACERATION INVOLVING TENDON, RIGHT, INITIAL ENCOUNTER: ICD-10-CM

## 2020-02-11 DIAGNOSIS — S96.921A FOOT LACERATION INVOLVING TENDON, RIGHT, INITIAL ENCOUNTER: ICD-10-CM

## 2020-02-11 PROBLEM — R00.0 WIDE-COMPLEX TACHYCARDIA: Status: ACTIVE | Noted: 2020-02-11

## 2020-02-11 PROCEDURE — C1894 INTRO/SHEATH, NON-LASER: HCPCS | Performed by: INTERNAL MEDICINE

## 2020-02-11 PROCEDURE — 25010000002 FENTANYL CITRATE (PF) 100 MCG/2ML SOLUTION: Performed by: ANESTHESIOLOGIST ASSISTANT

## 2020-02-11 PROCEDURE — 93620 COMP EP EVL R AT VEN PAC&REC: CPT | Performed by: INTERNAL MEDICINE

## 2020-02-11 PROCEDURE — 25010000002 PROPOFOL 10 MG/ML EMULSION: Performed by: ANESTHESIOLOGIST ASSISTANT

## 2020-02-11 PROCEDURE — 25010000002 MIDAZOLAM PER 1 MG: Performed by: ANESTHESIOLOGIST ASSISTANT

## 2020-02-11 PROCEDURE — C1730 CATH, EP, 19 OR FEW ELECT: HCPCS | Performed by: INTERNAL MEDICINE

## 2020-02-11 PROCEDURE — 25010000002 HYDROCORTISONE SODIUM SUCCINATE 100 MG RECONSTITUTED SOLUTION: Performed by: ANESTHESIOLOGIST ASSISTANT

## 2020-02-11 PROCEDURE — 93619 COMPREHENSIVE EP EVALUATION: CPT | Performed by: INTERNAL MEDICINE

## 2020-02-11 PROCEDURE — 93623 PRGRMD STIMJ&PACG IV RX NFS: CPT | Performed by: INTERNAL MEDICINE

## 2020-02-11 RX ORDER — NALOXONE HCL 0.4 MG/ML
0.4 VIAL (ML) INJECTION
Status: DISCONTINUED | OUTPATIENT
Start: 2020-02-11 | End: 2020-02-11 | Stop reason: HOSPADM

## 2020-02-11 RX ORDER — SODIUM CHLORIDE 0.9 % (FLUSH) 0.9 %
3-10 SYRINGE (ML) INJECTION AS NEEDED
Status: DISCONTINUED | OUTPATIENT
Start: 2020-02-11 | End: 2020-02-11 | Stop reason: HOSPADM

## 2020-02-11 RX ORDER — LIDOCAINE HYDROCHLORIDE 10 MG/ML
0.5 INJECTION, SOLUTION EPIDURAL; INFILTRATION; INTRACAUDAL; PERINEURAL ONCE AS NEEDED
Status: DISCONTINUED | OUTPATIENT
Start: 2020-02-11 | End: 2020-02-11 | Stop reason: HOSPADM

## 2020-02-11 RX ORDER — FENTANYL CITRATE 50 UG/ML
INJECTION, SOLUTION INTRAMUSCULAR; INTRAVENOUS AS NEEDED
Status: DISCONTINUED | OUTPATIENT
Start: 2020-02-11 | End: 2020-02-11 | Stop reason: SURG

## 2020-02-11 RX ORDER — PROMETHAZINE HYDROCHLORIDE 25 MG/ML
6.25 INJECTION, SOLUTION INTRAMUSCULAR; INTRAVENOUS ONCE AS NEEDED
Status: DISCONTINUED | OUTPATIENT
Start: 2020-02-11 | End: 2020-02-11 | Stop reason: HOSPADM

## 2020-02-11 RX ORDER — SODIUM CHLORIDE 0.9 % (FLUSH) 0.9 %
3 SYRINGE (ML) INJECTION EVERY 12 HOURS SCHEDULED
Status: DISCONTINUED | OUTPATIENT
Start: 2020-02-11 | End: 2020-02-11 | Stop reason: HOSPADM

## 2020-02-11 RX ORDER — DIPHENHYDRAMINE HYDROCHLORIDE 50 MG/ML
12.5 INJECTION INTRAMUSCULAR; INTRAVENOUS
Status: DISCONTINUED | OUTPATIENT
Start: 2020-02-11 | End: 2020-02-11 | Stop reason: HOSPADM

## 2020-02-11 RX ORDER — MORPHINE SULFATE 4 MG/ML
2 INJECTION, SOLUTION INTRAMUSCULAR; INTRAVENOUS
Status: DISCONTINUED | OUTPATIENT
Start: 2020-02-11 | End: 2020-02-11 | Stop reason: HOSPADM

## 2020-02-11 RX ORDER — LABETALOL HYDROCHLORIDE 5 MG/ML
5 INJECTION, SOLUTION INTRAVENOUS
Status: DISCONTINUED | OUTPATIENT
Start: 2020-02-11 | End: 2020-02-11 | Stop reason: HOSPADM

## 2020-02-11 RX ORDER — MEPERIDINE HYDROCHLORIDE 25 MG/ML
12.5 INJECTION INTRAMUSCULAR; INTRAVENOUS; SUBCUTANEOUS
Status: DISCONTINUED | OUTPATIENT
Start: 2020-02-11 | End: 2020-02-11 | Stop reason: HOSPADM

## 2020-02-11 RX ORDER — PROMETHAZINE HYDROCHLORIDE 25 MG/1
25 SUPPOSITORY RECTAL ONCE AS NEEDED
Status: DISCONTINUED | OUTPATIENT
Start: 2020-02-11 | End: 2020-02-11 | Stop reason: HOSPADM

## 2020-02-11 RX ORDER — OXYCODONE HYDROCHLORIDE 5 MG/1
5 TABLET ORAL ONCE AS NEEDED
Status: DISCONTINUED | OUTPATIENT
Start: 2020-02-11 | End: 2020-02-11 | Stop reason: HOSPADM

## 2020-02-11 RX ORDER — HYDROMORPHONE HCL 110MG/55ML
1 PATIENT CONTROLLED ANALGESIA SYRINGE INTRAVENOUS
Status: DISCONTINUED | OUTPATIENT
Start: 2020-02-11 | End: 2020-02-11 | Stop reason: HOSPADM

## 2020-02-11 RX ORDER — HYDRALAZINE HYDROCHLORIDE 20 MG/ML
5 INJECTION INTRAMUSCULAR; INTRAVENOUS
Status: DISCONTINUED | OUTPATIENT
Start: 2020-02-11 | End: 2020-02-11 | Stop reason: HOSPADM

## 2020-02-11 RX ORDER — MIDAZOLAM HYDROCHLORIDE 1 MG/ML
INJECTION INTRAMUSCULAR; INTRAVENOUS AS NEEDED
Status: DISCONTINUED | OUTPATIENT
Start: 2020-02-11 | End: 2020-02-11 | Stop reason: SURG

## 2020-02-11 RX ORDER — PROMETHAZINE HYDROCHLORIDE 25 MG/1
25 TABLET ORAL ONCE AS NEEDED
Status: DISCONTINUED | OUTPATIENT
Start: 2020-02-11 | End: 2020-02-11 | Stop reason: HOSPADM

## 2020-02-11 RX ORDER — IPRATROPIUM BROMIDE AND ALBUTEROL SULFATE 2.5; .5 MG/3ML; MG/3ML
3 SOLUTION RESPIRATORY (INHALATION) ONCE AS NEEDED
Status: DISCONTINUED | OUTPATIENT
Start: 2020-02-11 | End: 2020-02-11 | Stop reason: HOSPADM

## 2020-02-11 RX ORDER — SODIUM CHLORIDE 0.9 % (FLUSH) 0.9 %
10 SYRINGE (ML) INJECTION AS NEEDED
Status: DISCONTINUED | OUTPATIENT
Start: 2020-02-11 | End: 2020-02-11 | Stop reason: HOSPADM

## 2020-02-11 RX ORDER — SODIUM CHLORIDE 9 MG/ML
9 INJECTION, SOLUTION INTRAVENOUS CONTINUOUS PRN
Status: DISCONTINUED | OUTPATIENT
Start: 2020-02-11 | End: 2020-02-11 | Stop reason: HOSPADM

## 2020-02-11 RX ORDER — MORPHINE SULFATE 4 MG/ML
2 INJECTION, SOLUTION INTRAMUSCULAR; INTRAVENOUS EVERY 4 HOURS PRN
Status: DISCONTINUED | OUTPATIENT
Start: 2020-02-11 | End: 2020-02-11 | Stop reason: HOSPADM

## 2020-02-11 RX ORDER — LIDOCAINE HYDROCHLORIDE 20 MG/ML
INJECTION, SOLUTION INFILTRATION; PERINEURAL AS NEEDED
Status: DISCONTINUED | OUTPATIENT
Start: 2020-02-11 | End: 2020-02-11 | Stop reason: HOSPADM

## 2020-02-11 RX ORDER — ONDANSETRON 2 MG/ML
4 INJECTION INTRAMUSCULAR; INTRAVENOUS EVERY 6 HOURS PRN
Status: DISCONTINUED | OUTPATIENT
Start: 2020-02-11 | End: 2020-02-11 | Stop reason: HOSPADM

## 2020-02-11 RX ORDER — EPHEDRINE SULFATE 50 MG/ML
5 INJECTION, SOLUTION INTRAVENOUS ONCE AS NEEDED
Status: DISCONTINUED | OUTPATIENT
Start: 2020-02-11 | End: 2020-02-11 | Stop reason: HOSPADM

## 2020-02-11 RX ORDER — SODIUM CHLORIDE 9 MG/ML
30 INJECTION, SOLUTION INTRAVENOUS CONTINUOUS
Status: DISCONTINUED | OUTPATIENT
Start: 2020-02-11 | End: 2020-02-11 | Stop reason: HOSPADM

## 2020-02-11 RX ORDER — ONDANSETRON 2 MG/ML
4 INJECTION INTRAMUSCULAR; INTRAVENOUS ONCE AS NEEDED
Status: DISCONTINUED | OUTPATIENT
Start: 2020-02-11 | End: 2020-02-11 | Stop reason: HOSPADM

## 2020-02-11 RX ORDER — OXYCODONE HYDROCHLORIDE 5 MG/1
10 TABLET ORAL EVERY 4 HOURS PRN
Status: DISCONTINUED | OUTPATIENT
Start: 2020-02-11 | End: 2020-02-11 | Stop reason: HOSPADM

## 2020-02-11 RX ADMIN — HYDROCORTISONE SODIUM SUCCINATE 100 MG: 100 INJECTION, POWDER, FOR SOLUTION INTRAMUSCULAR; INTRAVENOUS at 09:31

## 2020-02-11 RX ADMIN — MIDAZOLAM 2 MG: 1 INJECTION INTRAMUSCULAR; INTRAVENOUS at 08:44

## 2020-02-11 RX ADMIN — SODIUM CHLORIDE: 900 INJECTION, SOLUTION INTRAVENOUS at 09:40

## 2020-02-11 RX ADMIN — PROPOFOL 50 MCG/KG/MIN: 10 INJECTION, EMULSION INTRAVENOUS at 08:45

## 2020-02-11 RX ADMIN — FENTANYL CITRATE 50 MCG: 50 INJECTION, SOLUTION INTRAMUSCULAR; INTRAVENOUS at 08:50

## 2020-02-11 RX ADMIN — FENTANYL CITRATE 50 MCG: 50 INJECTION, SOLUTION INTRAMUSCULAR; INTRAVENOUS at 08:52

## 2020-02-11 RX ADMIN — SODIUM CHLORIDE 30 ML/HR: 900 INJECTION, SOLUTION INTRAVENOUS at 06:54

## 2020-02-11 RX ADMIN — MIDAZOLAM 3 MG: 1 INJECTION INTRAMUSCULAR; INTRAVENOUS at 08:42

## 2020-02-11 RX ADMIN — ISOPROTERENOL HYDROCHLORIDE 1 MCG/MIN: 1 INJECTION, SOLUTION INTRACARDIAC; INTRAMUSCULAR; INTRAVENOUS; SUBCUTANEOUS at 09:20

## 2020-02-11 NOTE — ANESTHESIA POSTPROCEDURE EVALUATION
Patient: Alyson Lew    Procedure Summary     Date:  02/11/20 Room / Location:  [facility] CATH LAB 3 / [facility] CATH INVASIVE LOCATION    Anesthesia Start:  0839 Anesthesia Stop:  0952    Procedure:  EP/Ablation (N/A ) Diagnosis:       Ventricular tachycardia (paroxysmal) (CMS/HCC)      Palpitations      Wide QRS ventricular tachycardia (CMS/HCC)      (Post EP study without complications)    Provider:  Luther Walden MD Provider:  Narinder August MD    Anesthesia Type:  general ASA Status:  3          Anesthesia Type: general    Vitals  Vitals Value Taken Time   BP 94/59 2/11/2020  2:36 PM   Temp     Pulse 66 2/11/2020  2:37 PM   Resp     SpO2 99 % 2/11/2020  1:25 PM   Vitals shown include unvalidated device data.        Post Anesthesia Care and Evaluation    Patient location during evaluation: PACU  Patient participation: complete - patient participated  Level of consciousness: awake  Pain scale: See nurse's notes for pain score.  Pain management: adequate  Airway patency: patent  Anesthetic complications: No anesthetic complications  PONV Status: none  Cardiovascular status: acceptable  Respiratory status: acceptable  Hydration status: acceptable    Comments: Patient seen and examined postoperatively; vital signs stable; SpO2 greater than or equal to 90%; cardiopulmonary status stable; nausea/vomiting adequately controlled; pain adequately controlled; no apparent anesthesia complications; patient discharged from anesthesia care when discharge criteria were met

## 2020-02-11 NOTE — ANESTHESIA PREPROCEDURE EVALUATION
Anesthesia Evaluation     Patient summary reviewed and Nursing notes reviewed   no history of anesthetic complications:  NPO Solid Status: > 8 hours  NPO Liquid Status: > 8 hours           Airway   Mallampati: II  TM distance: >3 FB  Neck ROM: full  No difficulty expected  Dental      Pulmonary - normal exam   (+) asthma,shortness of breath,   Cardiovascular - normal exam    (+) dysrhythmias Tachycardia,       Neuro/Psych  (+) CVA, headaches, numbness, psychiatric history PTSD and Depression,     GI/Hepatic/Renal/Endo    (+) obesity,       Musculoskeletal     (+) myalgias, neck pain,   Abdominal  - normal exam   Substance History      OB/GYN          Other   arthritis,    history of cancer                  Anesthesia Plan    ASA 3     general     intravenous induction     Anesthetic plan, all risks, benefits, and alternatives have been provided, discussed and informed consent has been obtained with: patient.

## 2020-02-11 NOTE — DISCHARGE INSTRUCTIONS
Post Cath Instructions      Specific Physician Instructions: ***    1) Drink plenty of fluids for the next 24 hours.  This helps to eliminate the dye used in your procedure through urination.  You may resume a normal diet; however, try to avoid foods that would cause gas or constipation.    2) Sedative medication given to you during your catheterization may decrease your judgement and reaction time for up to 24-48 hours.  Therefore:  a. DO NOT drive or operate hazardous machinery (48 hours)  b. DO NOT consume alcoholic beverages  c. DO NOT make any important/legal decisions  d. Have someone stay with you for at least 24 hours    3) To allow proper healing and prevent bleeding, the following activities are to be strictly avoided for the next 24-48 hours:  a. Excessive bending at wound site  b. Straining (anything that would tense up muscles around the affected puncture site)  c. Lifting objects greater than 5 pounds, pushing, or pulling for 5 days    i. For Groin Cases:  1. Refrain from sexual activity  2. Refrain from running or vigorous walking  3. No prolonged sitting or standing  4. Limit stair climbing as much as possible    4) Keep the puncture site clean and dry.  You may remove the dressing tomorrow and replace it with a band-aid for at least one additional day.  Gently clean the site with mild soap and water.  No scrubbing/rubbing and lightly pat the area dry.  Showers are acceptable; however, avoid submerging in water (tub baths, hot tubs, swimming pools, dishwater, etc…) for at least one week.  The site should be completely healed before resuming these activities to reduce the risk of infection.  Check the site often.  Watch for signs and symptoms of infection and notify your physician if any of the following occur:  a. Bleeding or an increase in swelling at the puncture site  b. Fever  c. Increased soreness around puncture site  d. Foul odor or significant drainage from the puncture site  e. Swelling,  redness, or warmth at the puncture site    **A bruise or small “pea sized” lump under the skin at the puncture site is not unusual.  This should disappear within 3-4 weeks.**  5) CONTACT YOUR PHYSICIAN OR CALL 911 IF YOU EXPERIENCE ANY OF THE FOLLOWING:  a. Increased angina (chest pain) or frequent sensations of pressure, burning, pain, or other discomfort in the chest, arm, jaws, or stomach  b. Lightheadedness, dizziness, faint feeling, sweating, or difficulty breathing  c. Odd sensation changes like numbness, tingling, coldness, or pain in the arm or leg in which the catheter was inserted  d. Limb in which the catheter was inserted becomes pale/bluish in color    IMPORTANT:  Although this occurs very rarely, if you should develop bright red or excessive bleeding, feel a “pop” inside at the insertion site, or notice a sudden increase in swelling larger than a walnut, you should call 911.  Hold continuous firm pressure to the access site until emergency personnel arrive.  It is best if someone else can do this for you.

## 2020-02-12 ENCOUNTER — APPOINTMENT (OUTPATIENT)
Dept: CARDIOLOGY | Facility: HOSPITAL | Age: 35
End: 2020-02-12

## 2020-02-12 ENCOUNTER — APPOINTMENT (OUTPATIENT)
Dept: PREADMISSION TESTING | Facility: HOSPITAL | Age: 35
End: 2020-02-12

## 2020-02-12 ENCOUNTER — HOSPITAL ENCOUNTER (OUTPATIENT)
Dept: GENERAL RADIOLOGY | Facility: HOSPITAL | Age: 35
Discharge: HOME | End: 2020-02-12
Admitting: PODIATRIST

## 2020-02-12 VITALS
HEART RATE: 57 BPM | WEIGHT: 155 LBS | HEIGHT: 63 IN | DIASTOLIC BLOOD PRESSURE: 60 MMHG | BODY MASS INDEX: 27.46 KG/M2 | OXYGEN SATURATION: 99 % | SYSTOLIC BLOOD PRESSURE: 100 MMHG

## 2020-02-12 DIAGNOSIS — S91.311A FOOT LACERATION INVOLVING TENDON, RIGHT, INITIAL ENCOUNTER: ICD-10-CM

## 2020-02-12 DIAGNOSIS — M66.271 SPONTANEOUS RUPTURE OF EXTENSOR TENDON OF RIGHT FOOT: ICD-10-CM

## 2020-02-12 DIAGNOSIS — S96.921A FOOT LACERATION INVOLVING TENDON, RIGHT, INITIAL ENCOUNTER: ICD-10-CM

## 2020-02-12 PROCEDURE — 71046 X-RAY EXAM CHEST 2 VIEWS: CPT

## 2020-02-13 ENCOUNTER — APPOINTMENT (OUTPATIENT)
Dept: PREADMISSION TESTING | Facility: HOSPITAL | Age: 35
End: 2020-02-13

## 2020-02-14 ENCOUNTER — ANESTHESIA EVENT (OUTPATIENT)
Dept: PERIOP | Facility: HOSPITAL | Age: 35
End: 2020-02-14

## 2020-02-14 NOTE — PAT
Call out to Dr. Walden's office on 2/13/2020 and 2/14/2020 requesting cardiac clearance for surgery on 2/17/2020.  LMTC with Pool

## 2020-02-17 ENCOUNTER — ANESTHESIA (OUTPATIENT)
Dept: PERIOP | Facility: HOSPITAL | Age: 35
End: 2020-02-17

## 2020-02-17 ENCOUNTER — HOSPITAL ENCOUNTER (OUTPATIENT)
Facility: HOSPITAL | Age: 35
Setting detail: HOSPITAL OUTPATIENT SURGERY
Discharge: HOME | End: 2020-02-17
Attending: PODIATRIST | Admitting: PODIATRIST

## 2020-02-17 ENCOUNTER — TELEPHONE (OUTPATIENT)
Dept: PAIN MEDICINE | Facility: HOSPITAL | Age: 35
End: 2020-02-17

## 2020-02-17 VITALS
SYSTOLIC BLOOD PRESSURE: 111 MMHG | WEIGHT: 157.63 LBS | TEMPERATURE: 99 F | OXYGEN SATURATION: 100 % | DIASTOLIC BLOOD PRESSURE: 67 MMHG | HEART RATE: 104 BPM | HEIGHT: 63 IN | RESPIRATION RATE: 15 BRPM | BODY MASS INDEX: 27.93 KG/M2

## 2020-02-17 DIAGNOSIS — S96.921A FOOT LACERATION INVOLVING TENDON, RIGHT, INITIAL ENCOUNTER: Primary | ICD-10-CM

## 2020-02-17 DIAGNOSIS — S91.311A FOOT LACERATION INVOLVING TENDON, RIGHT, INITIAL ENCOUNTER: Primary | ICD-10-CM

## 2020-02-17 LAB — HCG SERPL QL: NEGATIVE

## 2020-02-17 PROCEDURE — 25010000002 PROPOFOL 10 MG/ML EMULSION: Performed by: ANESTHESIOLOGIST ASSISTANT

## 2020-02-17 PROCEDURE — 25010000003 MEPERIDINE PER 100 MG: Performed by: ANESTHESIOLOGIST ASSISTANT

## 2020-02-17 PROCEDURE — 25010000002 FENTANYL CITRATE (PF) 100 MCG/2ML SOLUTION: Performed by: ANESTHESIOLOGY

## 2020-02-17 PROCEDURE — 25010000002 CEFAZOLIN PER 500 MG: Performed by: ANESTHESIOLOGIST ASSISTANT

## 2020-02-17 PROCEDURE — 28208 REPAIR OF FOOT TENDON: CPT | Performed by: PODIATRIST

## 2020-02-17 PROCEDURE — 64704 REVISE HAND/FOOT NERVE: CPT | Performed by: PODIATRIST

## 2020-02-17 PROCEDURE — 84703 CHORIONIC GONADOTROPIN ASSAY: CPT | Performed by: PODIATRIST

## 2020-02-17 PROCEDURE — 25010000002 ONDANSETRON PER 1 MG: Performed by: ANESTHESIOLOGIST ASSISTANT

## 2020-02-17 PROCEDURE — 25010000002 DEXAMETHASONE PER 1 MG: Performed by: ANESTHESIOLOGIST ASSISTANT

## 2020-02-17 PROCEDURE — 25010000002 ROPIVACAINE PER 1 MG: Performed by: ANESTHESIOLOGY

## 2020-02-17 PROCEDURE — 25010000002 MIDAZOLAM PER 1 MG: Performed by: ANESTHESIOLOGY

## 2020-02-17 PROCEDURE — 25010000002 DEXAMETHASONE PER 1 MG: Performed by: ANESTHESIOLOGY

## 2020-02-17 RX ORDER — DEXAMETHASONE SODIUM PHOSPHATE 4 MG/ML
INJECTION, SOLUTION INTRA-ARTICULAR; INTRALESIONAL; INTRAMUSCULAR; INTRAVENOUS; SOFT TISSUE AS NEEDED
Status: DISCONTINUED | OUTPATIENT
Start: 2020-02-17 | End: 2020-02-17 | Stop reason: SURG

## 2020-02-17 RX ORDER — NALOXONE HCL 0.4 MG/ML
0.4 VIAL (ML) INJECTION AS NEEDED
Status: DISCONTINUED | OUTPATIENT
Start: 2020-02-17 | End: 2020-02-17 | Stop reason: HOSPADM

## 2020-02-17 RX ORDER — MEPERIDINE HYDROCHLORIDE 25 MG/ML
12.5 INJECTION INTRAMUSCULAR; INTRAVENOUS; SUBCUTANEOUS
Status: DISCONTINUED | OUTPATIENT
Start: 2020-02-17 | End: 2020-02-17 | Stop reason: HOSPADM

## 2020-02-17 RX ORDER — ROPIVACAINE HYDROCHLORIDE 5 MG/ML
INJECTION, SOLUTION EPIDURAL; INFILTRATION; PERINEURAL
Status: COMPLETED | OUTPATIENT
Start: 2020-02-17 | End: 2020-02-17

## 2020-02-17 RX ORDER — PROPOFOL 10 MG/ML
VIAL (ML) INTRAVENOUS AS NEEDED
Status: DISCONTINUED | OUTPATIENT
Start: 2020-02-17 | End: 2020-02-17 | Stop reason: SURG

## 2020-02-17 RX ORDER — PROMETHAZINE HYDROCHLORIDE 25 MG/1
25 SUPPOSITORY RECTAL ONCE AS NEEDED
Status: DISCONTINUED | OUTPATIENT
Start: 2020-02-17 | End: 2020-02-17 | Stop reason: HOSPADM

## 2020-02-17 RX ORDER — DEXAMETHASONE SODIUM PHOSPHATE 4 MG/ML
INJECTION, SOLUTION INTRA-ARTICULAR; INTRALESIONAL; INTRAMUSCULAR; INTRAVENOUS; SOFT TISSUE
Status: COMPLETED | OUTPATIENT
Start: 2020-02-17 | End: 2020-02-17

## 2020-02-17 RX ORDER — MIDAZOLAM HYDROCHLORIDE 1 MG/ML
INJECTION INTRAMUSCULAR; INTRAVENOUS
Status: COMPLETED | OUTPATIENT
Start: 2020-02-17 | End: 2020-02-17

## 2020-02-17 RX ORDER — ACETAMINOPHEN 650 MG/1
650 SUPPOSITORY RECTAL ONCE AS NEEDED
Status: DISCONTINUED | OUTPATIENT
Start: 2020-02-17 | End: 2020-02-17 | Stop reason: HOSPADM

## 2020-02-17 RX ORDER — ACETAMINOPHEN 325 MG/1
650 TABLET ORAL ONCE AS NEEDED
Status: DISCONTINUED | OUTPATIENT
Start: 2020-02-17 | End: 2020-02-17 | Stop reason: HOSPADM

## 2020-02-17 RX ORDER — ONDANSETRON 2 MG/ML
4 INJECTION INTRAMUSCULAR; INTRAVENOUS ONCE AS NEEDED
Status: DISCONTINUED | OUTPATIENT
Start: 2020-02-17 | End: 2020-02-17 | Stop reason: HOSPADM

## 2020-02-17 RX ORDER — PROMETHAZINE HYDROCHLORIDE 25 MG/1
25 TABLET ORAL ONCE AS NEEDED
Status: DISCONTINUED | OUTPATIENT
Start: 2020-02-17 | End: 2020-02-17 | Stop reason: HOSPADM

## 2020-02-17 RX ORDER — GLYCOPYRROLATE 0.2 MG/ML
INJECTION INTRAMUSCULAR; INTRAVENOUS AS NEEDED
Status: DISCONTINUED | OUTPATIENT
Start: 2020-02-17 | End: 2020-02-17 | Stop reason: SURG

## 2020-02-17 RX ORDER — PROMETHAZINE HYDROCHLORIDE 25 MG/ML
6.25 INJECTION, SOLUTION INTRAMUSCULAR; INTRAVENOUS ONCE AS NEEDED
Status: DISCONTINUED | OUTPATIENT
Start: 2020-02-17 | End: 2020-02-17 | Stop reason: HOSPADM

## 2020-02-17 RX ORDER — PHENYLEPHRINE HCL IN 0.9% NACL 0.5 MG/5ML
SYRINGE (ML) INTRAVENOUS AS NEEDED
Status: DISCONTINUED | OUTPATIENT
Start: 2020-02-17 | End: 2020-02-17 | Stop reason: SURG

## 2020-02-17 RX ORDER — FLUMAZENIL 0.1 MG/ML
0.1 INJECTION INTRAVENOUS AS NEEDED
Status: DISCONTINUED | OUTPATIENT
Start: 2020-02-17 | End: 2020-02-17 | Stop reason: HOSPADM

## 2020-02-17 RX ORDER — SODIUM CHLORIDE, SODIUM LACTATE, POTASSIUM CHLORIDE, CALCIUM CHLORIDE 600; 310; 30; 20 MG/100ML; MG/100ML; MG/100ML; MG/100ML
9 INJECTION, SOLUTION INTRAVENOUS CONTINUOUS PRN
Status: DISCONTINUED | OUTPATIENT
Start: 2020-02-17 | End: 2020-02-17 | Stop reason: HOSPADM

## 2020-02-17 RX ORDER — SODIUM CHLORIDE 0.9 % (FLUSH) 0.9 %
10 SYRINGE (ML) INJECTION EVERY 12 HOURS SCHEDULED
Status: DISCONTINUED | OUTPATIENT
Start: 2020-02-17 | End: 2020-02-17 | Stop reason: HOSPADM

## 2020-02-17 RX ORDER — LABETALOL HYDROCHLORIDE 5 MG/ML
5 INJECTION, SOLUTION INTRAVENOUS
Status: DISCONTINUED | OUTPATIENT
Start: 2020-02-17 | End: 2020-02-17 | Stop reason: HOSPADM

## 2020-02-17 RX ORDER — DIPHENHYDRAMINE HYDROCHLORIDE 50 MG/ML
12.5 INJECTION INTRAMUSCULAR; INTRAVENOUS
Status: DISCONTINUED | OUTPATIENT
Start: 2020-02-17 | End: 2020-02-17 | Stop reason: HOSPADM

## 2020-02-17 RX ORDER — HYDROCODONE BITARTRATE AND ACETAMINOPHEN 7.5; 325 MG/1; MG/1
1 TABLET ORAL EVERY 6 HOURS PRN
Qty: 28 TABLET | Refills: 0 | Status: SHIPPED | OUTPATIENT
Start: 2020-02-17 | End: 2020-03-17

## 2020-02-17 RX ORDER — FENTANYL CITRATE 50 UG/ML
INJECTION, SOLUTION INTRAMUSCULAR; INTRAVENOUS
Status: COMPLETED | OUTPATIENT
Start: 2020-02-17 | End: 2020-02-17

## 2020-02-17 RX ORDER — SODIUM CHLORIDE 0.9 % (FLUSH) 0.9 %
10 SYRINGE (ML) INJECTION AS NEEDED
Status: DISCONTINUED | OUTPATIENT
Start: 2020-02-17 | End: 2020-02-17 | Stop reason: HOSPADM

## 2020-02-17 RX ORDER — EPHEDRINE SULFATE/0.9% NACL/PF 25 MG/5 ML
SYRINGE (ML) INTRAVENOUS AS NEEDED
Status: DISCONTINUED | OUTPATIENT
Start: 2020-02-17 | End: 2020-02-17 | Stop reason: SURG

## 2020-02-17 RX ORDER — IPRATROPIUM BROMIDE AND ALBUTEROL SULFATE 2.5; .5 MG/3ML; MG/3ML
3 SOLUTION RESPIRATORY (INHALATION) ONCE AS NEEDED
Status: DISCONTINUED | OUTPATIENT
Start: 2020-02-17 | End: 2020-02-17 | Stop reason: HOSPADM

## 2020-02-17 RX ORDER — LORAZEPAM 2 MG/ML
1 INJECTION INTRAMUSCULAR EVERY 4 HOURS PRN
Status: DISCONTINUED | OUTPATIENT
Start: 2020-02-17 | End: 2020-02-17 | Stop reason: HOSPADM

## 2020-02-17 RX ORDER — ONDANSETRON 2 MG/ML
INJECTION INTRAMUSCULAR; INTRAVENOUS AS NEEDED
Status: DISCONTINUED | OUTPATIENT
Start: 2020-02-17 | End: 2020-02-17 | Stop reason: SURG

## 2020-02-17 RX ADMIN — Medication 10 MG: at 12:11

## 2020-02-17 RX ADMIN — PHENYLEPHRINE HYDROCHLORIDE 100 MCG: 10 INJECTION INTRAVENOUS at 12:40

## 2020-02-17 RX ADMIN — ROPIVACAINE HYDROCHLORIDE 30 ML: 5 INJECTION, SOLUTION EPIDURAL; INFILTRATION; PERINEURAL at 11:39

## 2020-02-17 RX ADMIN — PROPOFOL 200 MG: 10 INJECTION, EMULSION INTRAVENOUS at 11:58

## 2020-02-17 RX ADMIN — CEFAZOLIN SODIUM 2 G: 10 INJECTION, POWDER, FOR SOLUTION INTRAVENOUS at 12:03

## 2020-02-17 RX ADMIN — SODIUM CHLORIDE, SODIUM LACTATE, POTASSIUM CHLORIDE, AND CALCIUM CHLORIDE 9 ML/HR: 600; 310; 30; 20 INJECTION, SOLUTION INTRAVENOUS at 10:08

## 2020-02-17 RX ADMIN — DEXAMETHASONE SODIUM PHOSPHATE 4 MG: 4 INJECTION, SOLUTION INTRAMUSCULAR; INTRAVENOUS at 12:00

## 2020-02-17 RX ADMIN — ROPIVACAINE HYDROCHLORIDE 20 ML: 5 INJECTION, SOLUTION EPIDURAL; INFILTRATION; PERINEURAL at 11:43

## 2020-02-17 RX ADMIN — FENTANYL CITRATE 100 MCG: 50 INJECTION, SOLUTION INTRAMUSCULAR; INTRAVENOUS at 11:39

## 2020-02-17 RX ADMIN — SODIUM CHLORIDE, SODIUM LACTATE, POTASSIUM CHLORIDE, AND CALCIUM CHLORIDE: 600; 310; 30; 20 INJECTION, SOLUTION INTRAVENOUS at 13:11

## 2020-02-17 RX ADMIN — MEPERIDINE HYDROCHLORIDE 12.5 MG: 25 INJECTION INTRAMUSCULAR; INTRAVENOUS; SUBCUTANEOUS at 14:10

## 2020-02-17 RX ADMIN — ONDANSETRON 4 MG: 2 INJECTION INTRAMUSCULAR; INTRAVENOUS at 13:06

## 2020-02-17 RX ADMIN — DEXAMETHASONE SODIUM PHOSPHATE 4 MG: 4 INJECTION, SOLUTION INTRAMUSCULAR; INTRAVENOUS at 11:39

## 2020-02-17 RX ADMIN — MIDAZOLAM 2 MG: 1 INJECTION INTRAMUSCULAR; INTRAVENOUS at 13:13

## 2020-02-17 RX ADMIN — Medication 15 MG: at 12:04

## 2020-02-17 RX ADMIN — MIDAZOLAM 2 MG: 1 INJECTION INTRAMUSCULAR; INTRAVENOUS at 11:39

## 2020-02-17 RX ADMIN — GLYCOPYRROLATE 0.2 MG: 0.2 INJECTION INTRAMUSCULAR; INTRAVENOUS at 12:42

## 2020-02-17 RX ADMIN — DEXAMETHASONE SODIUM PHOSPHATE 4 MG: 4 INJECTION, SOLUTION INTRAMUSCULAR; INTRAVENOUS at 11:43

## 2020-02-17 NOTE — ANESTHESIA PROCEDURE NOTES
Peripheral Block    Pre-sedation assessment completed: 2/17/2020 11:35 AM    Patient reassessed immediately prior to procedure    Patient location during procedure: pre-op  Start time: 2/17/2020 11:36 AM  Reason for block: at surgeon's request and post-op pain management  Performed by  Anesthesiologist: Luis Carlos Hale DO  Preanesthetic Checklist  Completed: patient identified, site marked, surgical consent, pre-op evaluation, timeout performed, IV checked, risks and benefits discussed and monitors and equipment checked  Prep:  Pt Position: supine  Sterile barriers:mask, cap and gloves  Prep: ChloraPrep  Patient monitoring: blood pressure monitoring, continuous pulse oximetry and EKG  Procedure  Sedation:yes    Guidance:ultrasound guided  ULTRASOUND INTERPRETATION. Using ultrasound guidance a 20 G gauge needle was placed in close proximity to the nerve, at which point, under ultrasound guidance anesthetic was injected in the area of the nerve and spread of the anesthesia was seen on ultrasound in close proximity thereto.  There were no abnormalities seen on ultrasound; a digital image was taken; and the patient tolerated the procedure with no complications. Images:still images obtained, printed/placed on chart    Laterality:right  Block Type:adductor canal block  Injection Technique:single-shotNeedle Gauge:20 G  Resistance on Injection: less than 15 psi    Medications Used: dexamethasone (DECADRON) injection, 4 mg  ropivacaine (NAROPIN) 0.5 % injection, 20 mL  Med admintered at 2/17/2020 11:43 AM      Post Assessment  Injection Assessment: negative aspiration for heme, no paresthesia on injection and incremental injection  Patient Tolerance:comfortable throughout block  Complications:no

## 2020-02-17 NOTE — ANESTHESIA PROCEDURE NOTES
Peripheral Block    Pre-sedation assessment completed: 2/17/2020 11:35 AM    Patient reassessed immediately prior to procedure    Patient location during procedure: pre-op  Start time: 2/17/2020 11:36 AM  Reason for block: at surgeon's request and post-op pain management  Performed by  Anesthesiologist: Luis Carlos Hale DO  Preanesthetic Checklist  Completed: patient identified, site marked, surgical consent, pre-op evaluation, timeout performed, IV checked, risks and benefits discussed and monitors and equipment checked  Prep:  Pt Position: supine  Sterile barriers:cap, gloves and mask  Prep: ChloraPrep  Patient monitoring: blood pressure monitoring, continuous pulse oximetry and EKG  Procedure  Sedation:no    Guidance:ultrasound guided  ULTRASOUND INTERPRETATION. Using ultrasound guidance a 20 G gauge needle was placed in close proximity to the nerve, at which point, under ultrasound guidance anesthetic was injected in the area of the nerve and spread of the anesthesia was seen on ultrasound in close proximity thereto.  There were no abnormalities seen on ultrasound; a digital image was taken; and the patient tolerated the procedure with no complications. Images:still images obtained, printed/placed on chart    Laterality:right  Block Type:sciatic  Injection Technique:single-shot  Needle Type:short-bevel  Needle Gauge:22 G  Resistance on Injection: less than 15 psi  Sedation medications used: midazolam (VERSED) injection, 2 mg  fentaNYL citrate (PF) (SUBLIMAZE) injection, 100 mcg  Medications Used: dexamethasone (DECADRON) injection, 4 mg  ropivacaine (NAROPIN) 0.5 % injection, 30 mL  Med admintered at 2/17/2020 11:39 AM      Post Assessment  Injection Assessment: negative aspiration for heme, no paresthesia on injection and incremental injection  Patient Tolerance:comfortable throughout block  Complications:no

## 2020-02-17 NOTE — ANESTHESIA POSTPROCEDURE EVALUATION
Patient: Alyson Lew    Procedure Summary     Date:  02/17/20 Room / Location:  [facility] OR 08 / [facility] MAIN OR    Anesthesia Start:  1156 Anesthesia Stop:  1326    Procedure:  EXTENSOR HALLICUS LONGUS REPAIR AND NERVE DECOMPRESSION (Right ) Diagnosis:       Foot laceration involving tendon, right, initial encounter      Spontaneous rupture of extensor tendon of right foot      (Foot laceration involving tendon, right, initial encounter [S91.311A, S96.921A])      (Spontaneous rupture of extensor tendon of right foot [M66.271])    Surgeon:  VALERIA Rajan DPM Provider:  Luis Carlos Hale DO    Anesthesia Type:  general with block ASA Status:  3          Anesthesia Type: general with block    Vitals  Vitals Value Taken Time   /63 2/17/2020  2:19 PM   Temp 97.2 °F (36.2 °C) 2/17/2020  1:22 PM   Pulse 93 2/17/2020  2:20 PM   Resp 12 2/17/2020  2:07 PM   SpO2 100 % 2/17/2020  2:20 PM   Vitals shown include unvalidated device data.        Post Anesthesia Care and Evaluation    Patient location during evaluation: PACU  Patient participation: complete - patient participated  Level of consciousness: awake  Pain scale: See nurse's notes for pain score.  Pain management: adequate  Airway patency: patent  Anesthetic complications: No anesthetic complications  PONV Status: none  Cardiovascular status: acceptable  Respiratory status: acceptable  Hydration status: acceptable    Comments: Patient seen and examined postoperatively; vital signs stable; SpO2 greater than or equal to 90%; cardiopulmonary status stable; nausea/vomiting adequately controlled; pain adequately controlled; no apparent anesthesia complications; patient discharged from anesthesia care when discharge criteria were met
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
## 2020-02-17 NOTE — ANESTHESIA PREPROCEDURE EVALUATION
Anesthesia Evaluation     Patient summary reviewed and Nursing notes reviewed   history of anesthetic complications: prolonged sedation  NPO Solid Status: > 8 hours  NPO Liquid Status: > 8 hours           Airway   Mallampati: II  TM distance: >3 FB  Neck ROM: full  No difficulty expected  Dental - normal exam     Pulmonary - normal exam   (+) asthma,shortness of breath,   Cardiovascular - negative cardio ROS and normal exam        Neuro/Psych  (+) CVA, headaches, numbness, psychiatric history Anxiety and Depression,     GI/Hepatic/Renal/Endo - negative ROS     Musculoskeletal     (+) myalgias, neck pain,   Abdominal  - normal exam    Bowel sounds: normal.   Substance History - negative use     OB/GYN negative ob/gyn ROS         Other   arthritis,    history of cancer remission                  Anesthesia Plan    ASA 3     general with block     intravenous induction     Anesthetic plan, all risks, benefits, and alternatives have been provided, discussed and informed consent has been obtained with: patient.    Plan discussed with [facility]AA.

## 2020-02-19 ENCOUNTER — TELEPHONE (OUTPATIENT)
Dept: PAIN MEDICINE | Facility: HOSPITAL | Age: 35
End: 2020-02-19

## 2020-02-19 ENCOUNTER — HOSPITAL ENCOUNTER (EMERGENCY)
Facility: HOSPITAL | Age: 35
Discharge: HOME | End: 2020-02-19
Attending: EMERGENCY MEDICINE | Admitting: EMERGENCY MEDICINE

## 2020-02-19 VITALS
HEART RATE: 61 BPM | BODY MASS INDEX: 28 KG/M2 | DIASTOLIC BLOOD PRESSURE: 54 MMHG | OXYGEN SATURATION: 95 % | HEIGHT: 63 IN | SYSTOLIC BLOOD PRESSURE: 98 MMHG | RESPIRATION RATE: 17 BRPM | WEIGHT: 158 LBS | TEMPERATURE: 98.8 F

## 2020-02-19 DIAGNOSIS — G89.18 ACUTE POSTOPERATIVE PAIN OF FOOT, RIGHT: Primary | ICD-10-CM

## 2020-02-19 DIAGNOSIS — M79.671 ACUTE POSTOPERATIVE PAIN OF FOOT, RIGHT: Primary | ICD-10-CM

## 2020-02-19 PROCEDURE — 25010000002 MORPHINE PER 10 MG: Performed by: EMERGENCY MEDICINE

## 2020-02-19 PROCEDURE — 96375 TX/PRO/DX INJ NEW DRUG ADDON: CPT

## 2020-02-19 PROCEDURE — 96374 THER/PROPH/DIAG INJ IV PUSH: CPT

## 2020-02-19 PROCEDURE — 25010000002 HYDROMORPHONE PER 4 MG

## 2020-02-19 PROCEDURE — 25010000002 ONDANSETRON PER 1 MG

## 2020-02-19 PROCEDURE — 25010000002 LORAZEPAM PER 2 MG: Performed by: EMERGENCY MEDICINE

## 2020-02-19 PROCEDURE — 99283 EMERGENCY DEPT VISIT LOW MDM: CPT

## 2020-02-19 RX ORDER — SODIUM CHLORIDE 0.9 % (FLUSH) 0.9 %
10 SYRINGE (ML) INJECTION AS NEEDED
Status: DISCONTINUED | OUTPATIENT
Start: 2020-02-19 | End: 2020-02-19 | Stop reason: HOSPADM

## 2020-02-19 RX ORDER — HYDROMORPHONE HCL 110MG/55ML
2 PATIENT CONTROLLED ANALGESIA SYRINGE INTRAVENOUS ONCE
Status: COMPLETED | OUTPATIENT
Start: 2020-02-19 | End: 2020-02-19

## 2020-02-19 RX ORDER — ONDANSETRON 2 MG/ML
INJECTION INTRAMUSCULAR; INTRAVENOUS
Status: COMPLETED
Start: 2020-02-19 | End: 2020-02-19

## 2020-02-19 RX ORDER — MORPHINE SULFATE 4 MG/ML
8 INJECTION, SOLUTION INTRAMUSCULAR; INTRAVENOUS ONCE
Status: COMPLETED | OUTPATIENT
Start: 2020-02-19 | End: 2020-02-19

## 2020-02-19 RX ORDER — ONDANSETRON 2 MG/ML
4 INJECTION INTRAMUSCULAR; INTRAVENOUS ONCE
Status: COMPLETED | OUTPATIENT
Start: 2020-02-19 | End: 2020-02-19

## 2020-02-19 RX ORDER — OXYCODONE AND ACETAMINOPHEN 10; 325 MG/1; MG/1
1 TABLET ORAL EVERY 4 HOURS PRN
Qty: 12 TABLET | Refills: 0 | Status: SHIPPED | OUTPATIENT
Start: 2020-02-19 | End: 2020-03-03

## 2020-02-19 RX ORDER — LORAZEPAM 2 MG/ML
1 INJECTION INTRAMUSCULAR ONCE
Status: COMPLETED | OUTPATIENT
Start: 2020-02-19 | End: 2020-02-19

## 2020-02-19 RX ORDER — HYDROMORPHONE HCL 110MG/55ML
PATIENT CONTROLLED ANALGESIA SYRINGE INTRAVENOUS
Status: COMPLETED
Start: 2020-02-19 | End: 2020-02-19

## 2020-02-19 RX ORDER — TIZANIDINE 4 MG/1
4 TABLET ORAL EVERY 8 HOURS PRN
Qty: 90 TABLET | Refills: 2 | Status: SHIPPED | OUTPATIENT
Start: 2020-02-19 | End: 2020-06-18 | Stop reason: SDUPTHER

## 2020-02-19 RX ADMIN — Medication 2 MG: at 05:53

## 2020-02-19 RX ADMIN — MORPHINE SULFATE 8 MG: 4 INJECTION INTRAVENOUS at 06:22

## 2020-02-19 RX ADMIN — ONDANSETRON 4 MG: 2 INJECTION INTRAMUSCULAR; INTRAVENOUS at 05:53

## 2020-02-19 RX ADMIN — HYDROMORPHONE HYDROCHLORIDE 2 MG: 2 INJECTION, SOLUTION INTRAMUSCULAR; INTRAVENOUS; SUBCUTANEOUS at 05:53

## 2020-02-19 RX ADMIN — LORAZEPAM 1 MG: 2 INJECTION INTRAMUSCULAR; INTRAVENOUS at 06:21

## 2020-02-19 NOTE — TELEPHONE ENCOUNTER
That's fine, but I don't prescribe Ativan. That's a Psych med, and usually prescribed by PCP or Psych. She'll have to get it from one of those. Thanks.

## 2020-02-19 NOTE — DISCHARGE INSTRUCTIONS
Continue current medications except use Percocet instead of hydrocodone for the next 3 days.  Call your pain management physician to discuss the possible use of Ativan short-term.  Also follow-up with Dr. Rajan.  Elevate foot

## 2020-02-19 NOTE — ED NOTES
"Pt reports she had surgery on her right foot at 1130 on Monday-she states the nerve block lasted a while and kept her pain under control with PO narcotics but early this morning she began to have sharp \"firey\" feeling pain shoot through her foot and up into her lower leg. Pt did contact the page number for  where she states they directed her to come here.     Jessica Bermudez RN  02/19/20 2606    "

## 2020-02-19 NOTE — TELEPHONE ENCOUNTER
Had to switch to Oxycodone from the Hydrocodone last night after an ER visit. Also the ER doctor wanted her to ask you if you would order her some Ativan? She had some in the ER last night and it really helped .

## 2020-02-19 NOTE — TELEPHONE ENCOUNTER
She wants to know if you would order a muscle relaxer for her she would try that instead of Ativan.
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
## 2020-02-19 NOTE — ED NOTES
Pt reports no improvement after pain medication- Pillow support and ice pack provided for pt. Will notify Jessica Khan, RN  02/19/20 [id number]

## 2020-02-22 LAB
Lab: 12
TOAL ENROLLMENT DAYS: 30

## 2020-02-22 PROCEDURE — 93272 ECG/REVIEW INTERPRET ONLY: CPT | Performed by: INTERNAL MEDICINE

## 2020-02-25 ENCOUNTER — TELEPHONE (OUTPATIENT)
Dept: CARDIOLOGY | Facility: CLINIC | Age: 35
End: 2020-02-25

## 2020-02-25 NOTE — TELEPHONE ENCOUNTER
Pt podiatry surgery was done at an urgent request last week.  I was not able to get clearance letter done.

## 2020-02-25 NOTE — TELEPHONE ENCOUNTER
----- Message from DANUTA Snyder sent at 2/14/2020 12:25 PM EST -----  She's ok from EP standpoint, no arrhythmias induced on study.  However, she is Dr. Fagan's patient.     Analilia  ----- Message -----  From: Lana Kinsey MA  Sent: 2/13/2020   3:27 PM EST  To: DANUTA Snyder    Pt had ep study done on 02/11/2020, she is scheduled for surgery on 02/17/2020.  She is needing cardiac clearance can we clear her?

## 2020-03-03 ENCOUNTER — OFFICE VISIT (OUTPATIENT)
Dept: PODIATRY | Facility: CLINIC | Age: 35
End: 2020-03-03

## 2020-03-03 VITALS
HEART RATE: 54 BPM | WEIGHT: 157 LBS | DIASTOLIC BLOOD PRESSURE: 65 MMHG | BODY MASS INDEX: 27.82 KG/M2 | HEIGHT: 63 IN | SYSTOLIC BLOOD PRESSURE: 100 MMHG

## 2020-03-03 DIAGNOSIS — M66.271 SPONTANEOUS RUPTURE OF EXTENSOR TENDON OF RIGHT FOOT: ICD-10-CM

## 2020-03-03 DIAGNOSIS — S91.311A FOOT LACERATION INVOLVING TENDON, RIGHT, INITIAL ENCOUNTER: Primary | ICD-10-CM

## 2020-03-03 DIAGNOSIS — S96.921A FOOT LACERATION INVOLVING TENDON, RIGHT, INITIAL ENCOUNTER: Primary | ICD-10-CM

## 2020-03-03 PROCEDURE — 99024 POSTOP FOLLOW-UP VISIT: CPT | Performed by: PODIATRIST

## 2020-03-03 NOTE — PROGRESS NOTES
03/03/2020  Foot and Ankle Surgery - Established Patient/Follow-up  Provider: Dr. Adeel Rajan DPM  Location: [facility]    Subjective:  Alyson Lew is a 34 y.o. female.     Chief Complaint   Patient presents with   • Right Foot - Follow-up       HPI: Patient returns for initial postop visit after extensor tendon repair.  She states that she is having quite a bit of discomfort, but feels that most of her pain is due to fibromyalgia flare.  She has remained off weightbearing as instructed with the knee scooter.  No other issues today.    Allergies   Allergen Reactions   • Adhesive Tape Hives   • Latex Hives and Unknown - High Severity       Current Outpatient Medications on File Prior to Visit   Medication Sig Dispense Refill   • albuterol sulfate HFA (PROAIR HFA) 108 (90 Base) MCG/ACT inhaler Inhale 2 puffs Every 4 (Four) Hours As Needed for Wheezing. 1 inhaler 1   • fluticasone (FLONASE) 50 MCG/ACT nasal spray 2 sprays into the nostril(s) as directed by provider Daily. 1 bottle 2   • HYDROcodone-acetaminophen (NORCO) 7.5-325 MG per tablet Take 1 tablet by mouth Every 6 (Six) Hours As Needed for Moderate Pain  (Pain). 28 tablet 0   • levothyroxine (LEVO-T) 75 MCG tablet Take 1 tablet by mouth Daily. 30 tablet 1   • linaclotide (LINZESS) 145 MCG capsule capsule Take 1 capsule by mouth Every Morning Before Breakfast. 30 capsule    • LYRICA 200 MG capsule Take 1 capsule by mouth 3 (Three) Times a Day. 90 capsule 2   • Morphine (MS CONTIN) 30 MG 12 hr tablet Take 1 tablet by mouth 2 (Two) Times a Day. 60 tablet 0   • naproxen (NAPROSYN) 500 MG tablet TAKE ONE (1) TABLET BY MOUTH EVERY TWELVE HOURS WITH FOOD OR MILK AS NEEDED  0   • omeprazole (PrilOSEC) 40 MG capsule Take 1 capsule by mouth Daily. 30 capsule 6   • Pediatric Multiple Vit-C-FA (FLINSTONES GUMMIES OMEGA-3 DHA) chewable tablet FLINSTONES GUMMIES OMEGA-3 DHA CHEW     • tiZANidine (ZANAFLEX) 4 MG tablet Take 1 tablet by mouth Every 8 (Eight)  "Hours As Needed for Muscle Spasms. 90 tablet 2   • traZODone (DESYREL) 150 MG tablet Take 1 tablet by mouth Every Night. 30 tablet 0   • [DISCONTINUED] oxyCODONE-acetaminophen (PERCOCET)  MG per tablet Take 1 tablet by mouth Every 4 (Four) Hours As Needed for Moderate Pain  for up to 12 doses. 12 tablet 0     No current facility-administered medications on file prior to visit.        Objective   /65   Pulse 54   Ht 160 cm (63\")   Wt 71.2 kg (157 lb)   BMI 27.81 kg/m²     General:   Appearance: appears stated age and healthy    Orientation: AAOx3    Affect: appropriate    Gait: antalgic        Right Foot/Ankle:       Vascular   Dorsalis pedis:  2+  Posterior tibial:  2+  Skin Temperature: warm    Edema Grading:  None  CFT:  < 3 seconds  Pedal Hair Growth:  Present      Neurologic   Right ankle sensation: Allodynia and paresthesia noted to the laceration site.  Vibratory sensation:  Normal  Hot/Cold sensation: normal    Protective Sensation using [place] Monofilament:  10  Achilles reflex:  2+      Musculoskeletal   Ecchymosis:  None  Tenderness: dorsal foot    Arch:  Normal      Range of Motion   Foot and ankle ROM within normal limits        Dermatologic   Skin comment: Incision site is well-healed with intact suture.  No evidence of dehiscence or infection.      Additional Comments EHL tendon remains intact.    Assessment/Plan   Alyson was seen today for follow-up.    Diagnoses and all orders for this visit:    Foot laceration involving tendon, right, initial encounter    Spontaneous rupture of extensor tendon of right foot      Patient presents for initial evaluation after surgery.  Sutures were removed today without complication.  The incision is well coapted.  She continues to have fairly significant sensitivity to the dorsum of the foot.  I have asked that she remain off weightbearing and in a cam boot.  She may start gentle range of motion and manual therapy exercises.  I would like to " see her in 2 weeks for reevaluation.  No orders of the defined types were placed in this encounter.         Note is dictated utilizing voice recognition software. Unfortunately this leads to occasional typographical errors. I apologize in advance if the situation occurs. If questions occur please do not hesitate to call our office.

## 2020-03-17 ENCOUNTER — OFFICE VISIT (OUTPATIENT)
Dept: FAMILY MEDICINE CLINIC | Facility: CLINIC | Age: 35
End: 2020-03-17

## 2020-03-17 ENCOUNTER — OFFICE VISIT (OUTPATIENT)
Dept: PAIN MEDICINE | Facility: CLINIC | Age: 35
End: 2020-03-17

## 2020-03-17 VITALS
BODY MASS INDEX: 27.31 KG/M2 | DIASTOLIC BLOOD PRESSURE: 69 MMHG | HEART RATE: 67 BPM | SYSTOLIC BLOOD PRESSURE: 107 MMHG | HEIGHT: 64 IN | OXYGEN SATURATION: 97 % | RESPIRATION RATE: 16 BRPM | TEMPERATURE: 98.1 F | WEIGHT: 160 LBS

## 2020-03-17 VITALS
RESPIRATION RATE: 16 BRPM | BODY MASS INDEX: 25.95 KG/M2 | TEMPERATURE: 98.1 F | DIASTOLIC BLOOD PRESSURE: 64 MMHG | HEART RATE: 59 BPM | SYSTOLIC BLOOD PRESSURE: 96 MMHG | OXYGEN SATURATION: 99 % | WEIGHT: 152 LBS | HEIGHT: 64 IN

## 2020-03-17 DIAGNOSIS — R30.0 DYSURIA: Primary | ICD-10-CM

## 2020-03-17 DIAGNOSIS — M54.50 CHRONIC MIDLINE LOW BACK PAIN, UNSPECIFIED WHETHER SCIATICA PRESENT: ICD-10-CM

## 2020-03-17 DIAGNOSIS — M54.16 LUMBAR RADICULOPATHY: ICD-10-CM

## 2020-03-17 DIAGNOSIS — M46.1 SACROILIITIS, NOT ELSEWHERE CLASSIFIED (HCC): ICD-10-CM

## 2020-03-17 DIAGNOSIS — G89.29 NECK PAIN, CHRONIC: ICD-10-CM

## 2020-03-17 DIAGNOSIS — M54.2 NECK PAIN, CHRONIC: ICD-10-CM

## 2020-03-17 DIAGNOSIS — M25.511 CHRONIC RIGHT SHOULDER PAIN: ICD-10-CM

## 2020-03-17 DIAGNOSIS — M79.7 FIBROMYALGIA: ICD-10-CM

## 2020-03-17 DIAGNOSIS — G89.29 CHRONIC RIGHT SHOULDER PAIN: ICD-10-CM

## 2020-03-17 DIAGNOSIS — M79.601 PAIN IN RIGHT ARM: ICD-10-CM

## 2020-03-17 DIAGNOSIS — G89.29 CHRONIC MIDLINE LOW BACK PAIN, UNSPECIFIED WHETHER SCIATICA PRESENT: ICD-10-CM

## 2020-03-17 DIAGNOSIS — M25.50 PAIN IN JOINT INVOLVING MULTIPLE SITES: ICD-10-CM

## 2020-03-17 DIAGNOSIS — K59.03 DRUG-INDUCED CONSTIPATION: Primary | ICD-10-CM

## 2020-03-17 PROCEDURE — 99213 OFFICE O/P EST LOW 20 MIN: CPT | Performed by: NURSE PRACTITIONER

## 2020-03-17 PROCEDURE — 99214 OFFICE O/P EST MOD 30 MIN: CPT | Performed by: PHYSICAL MEDICINE & REHABILITATION

## 2020-03-17 PROCEDURE — G0463 HOSPITAL OUTPT CLINIC VISIT: HCPCS | Performed by: PHYSICAL MEDICINE & REHABILITATION

## 2020-03-17 RX ORDER — MORPHINE SULFATE 30 MG/1
30 TABLET, FILM COATED, EXTENDED RELEASE ORAL 2 TIMES DAILY
Qty: 60 TABLET | Refills: 0 | Status: SHIPPED | OUTPATIENT
Start: 2020-03-17 | End: 2020-03-17 | Stop reason: SDUPTHER

## 2020-03-17 RX ORDER — MORPHINE SULFATE 30 MG/1
30 TABLET, FILM COATED, EXTENDED RELEASE ORAL 2 TIMES DAILY
Qty: 60 TABLET | Refills: 0 | Status: SHIPPED | OUTPATIENT
Start: 2020-03-17 | End: 2020-06-18 | Stop reason: SDUPTHER

## 2020-03-17 RX ORDER — NITROFURANTOIN 25; 75 MG/1; MG/1
100 CAPSULE ORAL 2 TIMES DAILY
Qty: 10 CAPSULE | Refills: 0 | Status: SHIPPED | OUTPATIENT
Start: 2020-03-17 | End: 2020-03-22

## 2020-03-17 NOTE — PROGRESS NOTES
"Subjective   Alyson Lew is a 34 y.o. female.     Patient is here today with complaints of possible UTI.  She started having symptoms about 1 week ago.  She has been having some urinary frequency and some dysuria.  Denies fever or chills.  Has tried taking azo, which has been helping with her symptoms.  She has been pushing fluid intake.  Denies any hematuria.  She reports some low back pain.           The following portions of the patient's history were reviewed and updated as appropriate: allergies, current medications, past family history, past medical history, past social history, past surgical history and problem list.    Review of Systems   Constitutional: Negative for chills, fatigue and fever.   Respiratory: Negative for chest tightness and shortness of breath.    Cardiovascular: Negative for chest pain and palpitations.   Genitourinary: Positive for dysuria, flank pain, frequency and urgency.   Neurological: Negative for dizziness and headache.       Objective   /69 (BP Location: Left arm, Patient Position: Sitting, Cuff Size: Adult)   Pulse 67   Temp 98.1 °F (36.7 °C) (Oral)   Resp 16   Ht 162.6 cm (64\")   Wt 72.6 kg (160 lb)   SpO2 97%   Breastfeeding No   BMI 27.46 kg/m²   Physical Exam   Constitutional: She is oriented to person, place, and time. She appears well-developed and well-nourished. No distress.   HENT:   Head: Normocephalic and atraumatic.   Cardiovascular: Normal rate, regular rhythm and normal heart sounds.   No murmur heard.  Pulmonary/Chest: Effort normal and breath sounds normal. No respiratory distress.   Abdominal: Soft.   Neurological: She is alert and oriented to person, place, and time.   Psychiatric: She has a normal mood and affect. Her behavior is normal. Judgment and thought content normal.         Assessment/Plan     Diagnoses and all orders for this visit:    1. Dysuria (Primary)  Comments:  unable to run UA  will send for culture  push fluids  treat with " macrobid  call if symptoms dont improve  Orders:  -     Urine Culture - Urine, Urine, Clean Catch; Future  -     nitrofurantoin, macrocrystal-monohydrate, (Macrobid) 100 MG capsule; Take 1 capsule by mouth 2 (Two) Times a Day for 5 days.  Dispense: 10 capsule; Refill: 0

## 2020-03-17 NOTE — PROGRESS NOTES
"Subjective   Alyson Lew is a 34 y.o. female.     all over pain, especially back, neck and bilateral hip pain, right knee. Dz with fibromyalgia and inflammatory arthritis by Rheum, on DMARDs and Lyrica 100mg BID at initial visit with poor control, pain worsening, 10/10 at worst, 7/10 at best, always present, prevents work and housework, burning, sharp, varies. Prior notes reviewed, referred for worsening pain, does not have time for PT. Increased to Lyrica 100mg BID then TID, started Celebrex, Cymbalta 30mg then 60mg qHS, fewer flares but still severe fibromyalgia pain, also LBP and b/l hip pain. Started Norco 5/325mg, mostly taking qHS, some days BID with worsening pain with cold weather, becoming less effective, rotated to Percocet 5/325mg. C/o insomnia. Went to ED with severe pain not controlled with Percocet 5/325mg BID prn, now QID prn. Had gastric sleeve surgery, liquid Norco worked well, restarted Percocet with severe N/V, vomited up complete prescription. Started liquid Norco, working better, but pain worsening, having difficulty with ADLs, increased Norco and Lyrica, then MS-IR 15mg QID prn with pill . Worsening L \"hip\" pain, insomnia, constipation. Requesting NSAID. Had toni. Has FH of autoimmune disorders and MS, hasn't heard from Rheum. Needs PT before having injection, worsening SIJ pain even with PT, had b/l SIJ injections, helped briefly only.       The following portions of the patient's history were reviewed and updated as appropriate: allergies, current medications, past family history, past medical history, past social history, past surgical history and problem list.    Review of Systems   Constitutional: Negative for chills, fatigue and fever.   HENT: Negative for hearing loss and trouble swallowing.    Eyes: Positive for visual disturbance.   Respiratory: Positive for shortness of breath.    Cardiovascular: Negative for chest pain.   Gastrointestinal: Positive for constipation. " Negative for abdominal pain, diarrhea, nausea and vomiting.   Genitourinary: Negative for urinary incontinence.   Musculoskeletal: Positive for arthralgias, back pain and neck pain. Negative for joint swelling and myalgias.   Neurological: Positive for headache. Negative for dizziness, weakness and numbness.       Objective   Physical Exam   Constitutional: She is oriented to person, place, and time. She appears well-developed and well-nourished.   HENT:   Head: Normocephalic and atraumatic.   Eyes: Pupils are equal, round, and reactive to light. EOM are normal.   Neck: Normal range of motion.   Cardiovascular: Normal rate, regular rhythm, normal heart sounds and intact distal pulses.   Pulmonary/Chest: Breath sounds normal.   Abdominal: Soft. Bowel sounds are normal. She exhibits no distension. There is no tenderness.   Musculoskeletal: She exhibits tenderness.   positive gianni finger test, TTP over b/l SIJ, b/l CARINA, and Gaenslens   Neurological: She is alert and oriented to person, place, and time. She has normal strength and normal reflexes. She displays normal reflexes. No sensory deficit.   Psychiatric: She has a normal mood and affect. Her behavior is normal. Thought content normal.         Assessment/Plan   Alyson was seen today for back pain.    Diagnoses and all orders for this visit:    Drug-induced constipation    Fibromyalgia    Chronic midline low back pain, unspecified whether sciatica present    Lumbar radiculopathy    Neck pain, chronic    Pain in joint involving multiple sites    Pain in right arm    Chronic right shoulder pain    Sacroiliitis, not elsewhere classified (CMS/HCC)        INspect reviewed, in order. Low risk. Repeat UDS 7/23/19 in order.  Increased to Lyrica 200mg TID, sedating, numbs her lips, but benefit outweighs side effects.  Cont Cymbalta 60mg qHS, helping, and sleeping better.  Stopped Norco 5/325mg, failed Percocet 5/325mg, started liquid Norco 7.5/325mg/15ml 15ml QID prn,  #1800. Increased to q4h prn, #2700, no longer working. Increased to MS-IR 15mg q6h prn, now MS-Contin 30mg BID, helping a lot more.  Failed Mobic 7.5mg qd - BID prn, failed Celebrex.  Cont Silenor.  Cont Relistor 450mg qdaily, failed Miralax, colace.  Cont Precision compounded cream, helping.  Order Flector BID prn.  New referral to Rheum.  Referral to PT for LBP and L hip pain.  Performed b/l SIJ injection.  RTC in 3 months for f/u.

## 2020-03-19 ENCOUNTER — OFFICE VISIT (OUTPATIENT)
Dept: PODIATRY | Facility: CLINIC | Age: 35
End: 2020-03-19

## 2020-03-19 VITALS
HEIGHT: 64 IN | BODY MASS INDEX: 27.31 KG/M2 | SYSTOLIC BLOOD PRESSURE: 103 MMHG | WEIGHT: 160 LBS | HEART RATE: 62 BPM | TEMPERATURE: 98.1 F | DIASTOLIC BLOOD PRESSURE: 64 MMHG

## 2020-03-19 DIAGNOSIS — M66.271 SPONTANEOUS RUPTURE OF EXTENSOR TENDON OF RIGHT FOOT: Primary | ICD-10-CM

## 2020-03-19 PROCEDURE — 99024 POSTOP FOLLOW-UP VISIT: CPT | Performed by: PODIATRIST

## 2020-03-19 NOTE — PROGRESS NOTES
03/19/2020  Foot and Ankle Surgery - Established Patient/Follow-up  Provider: Dr. Adeel Rajan DPM  Location: [facility]    Subjective:  Alyson Lew is a 34 y.o. female.     Chief Complaint   Patient presents with   • Right Foot - Follow-up, Post-op       HPI: Patient returns 4 weeks after EHL repair.  She states that she is doing well.  She has remained off weightbearing with the knee scooter.  She continues to have burning and tingling sensations but feels that she is making improvements.    Allergies   Allergen Reactions   • Adhesive Tape Hives   • Latex Hives and Unknown - High Severity       Current Outpatient Medications on File Prior to Visit   Medication Sig Dispense Refill   • albuterol sulfate HFA (PROAIR HFA) 108 (90 Base) MCG/ACT inhaler Inhale 2 puffs Every 4 (Four) Hours As Needed for Wheezing. 1 inhaler 1   • diclofenac (FLECTOR) 1.3 % patch patch Apply 1 patch topically to the appropriate area as directed 2 (Two) Times a Day As Needed (pain). 60 patch 2   • fluticasone (FLONASE) 50 MCG/ACT nasal spray 2 sprays into the nostril(s) as directed by provider Daily. 1 bottle 2   • levothyroxine (LEVO-T) 75 MCG tablet Take 1 tablet by mouth Daily. 30 tablet 1   • linaclotide (LINZESS) 145 MCG capsule capsule Take 1 capsule by mouth Every Morning Before Breakfast. 30 capsule    • LYRICA 200 MG capsule Take 1 capsule by mouth 3 (Three) Times a Day. 90 capsule 2   • Morphine (MS CONTIN) 30 MG 12 hr tablet Take 1 tablet by mouth 2 (Two) Times a Day. 60 tablet 0   • naproxen (NAPROSYN) 500 MG tablet TAKE ONE (1) TABLET BY MOUTH EVERY TWELVE HOURS WITH FOOD OR MILK AS NEEDED  0   • nitrofurantoin, macrocrystal-monohydrate, (Macrobid) 100 MG capsule Take 1 capsule by mouth 2 (Two) Times a Day for 5 days. 10 capsule 0   • omeprazole (PrilOSEC) 40 MG capsule Take 1 capsule by mouth Daily. 30 capsule 6   • Pediatric Multiple Vit-C-FA (FLINSTONES GUMMIES OMEGA-3 DHA) chewable tablet FLINSTONES  "GUMMIES OMEGA-3 DHA CHEW     • tiZANidine (ZANAFLEX) 4 MG tablet Take 1 tablet by mouth Every 8 (Eight) Hours As Needed for Muscle Spasms. 90 tablet 2   • traZODone (DESYREL) 150 MG tablet Take 1 tablet by mouth Every Night. 30 tablet 0     No current facility-administered medications on file prior to visit.        Objective   /64   Pulse 62   Temp 98.1 °F (36.7 °C) (Oral)   Ht 162.6 cm (64\")   Wt 72.6 kg (160 lb)   BMI 27.46 kg/m²     General:   Appearance: appears stated age and healthy    Orientation: AAOx3    Affect: appropriate    Gait: antalgic        Right Foot/Ankle:       Vascular   Dorsalis pedis:  2+  Posterior tibial:  2+  Skin Temperature: warm    Edema Grading:  None  CFT:  < 3 seconds  Pedal Hair Growth:  Present      Neurologic   Right ankle sensation: Allodynia and paresthesia noted to the laceration site.  Vibratory sensation:  Normal  Hot/Cold sensation: normal    Protective Sensation using [place]-Jigna Monofilament:  10  Achilles reflex:  2+      Musculoskeletal   Ecchymosis:  None  Tenderness: dorsal foot    Arch:  Normal      Range of Motion   Foot and ankle ROM within normal limits        Dermatologic   Skin comment: Incision site is well-healed    Assessment/Plan   Alyson was seen today for follow-up and post-op.    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  EHL remains grossly intact.  She does have improving motor function across the first MTPJ.  I have asked that she continue at home range of motion and stretching exercises.  I do feel that she would benefit from formal physical therapy.  I would like her to discontinue the knee scooter and start protected weightbearing activity as tolerated in the cam boot.  After 2 weeks, she may return to her regular shoe as tolerated.  I would like to see her in 4 weeks for reevaluation.    No orders of the defined types were placed in this " encounter.         Note is dictated utilizing voice recognition software. Unfortunately this leads to occasional typographical errors. I apologize in advance if the situation occurs. If questions occur please do not hesitate to call our office.

## 2020-04-15 ENCOUNTER — TELEPHONE (OUTPATIENT)
Dept: FAMILY MEDICINE CLINIC | Facility: CLINIC | Age: 35
End: 2020-04-15

## 2020-04-15 DIAGNOSIS — K59.03 DRUG-INDUCED CONSTIPATION: Primary | ICD-10-CM

## 2020-04-15 NOTE — TELEPHONE ENCOUNTER
Please verify what all she has tried at this time.  We have to try over the counter meds before prescribed meds will be approved.  I would like for her to try an over the counter stool softener such as colace and to try miralax 1-2 times a day as needed, which is a gentle laxative.

## 2020-04-16 ENCOUNTER — TELEPHONE (OUTPATIENT)
Dept: FAMILY MEDICINE CLINIC | Facility: CLINIC | Age: 35
End: 2020-04-16

## 2020-04-16 RX ORDER — LUBIPROSTONE 24 UG/1
24 CAPSULE ORAL 2 TIMES DAILY WITH MEALS
Qty: 60 CAPSULE | Refills: 2 | Status: SHIPPED | OUTPATIENT
Start: 2020-04-16 | End: 2021-12-23

## 2020-04-16 NOTE — TELEPHONE ENCOUNTER
Pt has tried several otc meds. (said name it she has tried it) and was giving samples  Of relistor 150mg. She said that that helped for a while. Asking if that or something else can be sent in. She has opioid induced constipation.   She said that she has discussed this with you before.  Best pharmacy is [facility] on [address] in [place]

## 2020-04-16 NOTE — TELEPHONE ENCOUNTER
error   Pt states she lives with spouse in a private home with a flight of steps to enter and 6 steps to reach bedroom. Pt states prior to admission being independent with all functional mobility and ADLs. Pt states she was ambulatory with a straight cane following last surgery, but has not used it since. Pt states she was very active and going to the gym frequently prior to admission.

## 2020-04-16 NOTE — TELEPHONE ENCOUNTER
Let pt know that we can try amitiza 24mcg BID.  It could initially cause some diarrhea, but if it continues let me know. Make sure she is not taking the linzess too.

## 2020-04-24 ENCOUNTER — TELEPHONE (OUTPATIENT)
Dept: FAMILY MEDICINE CLINIC | Facility: CLINIC | Age: 35
End: 2020-04-24

## 2020-04-24 NOTE — TELEPHONE ENCOUNTER
Spoke with patient informed her I had spoken with the pharmacy and insurance will not pay for medication gave them the fax number so they can send the PA. Informed pt she is currently seeing Cecilio

## 2020-04-30 ENCOUNTER — OFFICE VISIT (OUTPATIENT)
Dept: PODIATRY | Facility: CLINIC | Age: 35
End: 2020-04-30

## 2020-04-30 VITALS
HEIGHT: 64 IN | WEIGHT: 160 LBS | SYSTOLIC BLOOD PRESSURE: 115 MMHG | DIASTOLIC BLOOD PRESSURE: 74 MMHG | BODY MASS INDEX: 27.31 KG/M2 | HEART RATE: 78 BPM

## 2020-04-30 DIAGNOSIS — R20.2 PARESTHESIA OF RIGHT FOOT: ICD-10-CM

## 2020-04-30 DIAGNOSIS — M66.271 SPONTANEOUS RUPTURE OF EXTENSOR TENDON OF RIGHT FOOT: Primary | ICD-10-CM

## 2020-04-30 PROCEDURE — 99024 POSTOP FOLLOW-UP VISIT: CPT | Performed by: PODIATRIST

## 2020-04-30 NOTE — PROGRESS NOTES
04/30/2020  Foot and Ankle Surgery - Established Patient/Follow-up  Provider: Dr. Adeel Rajan DPM  Location: [facility]    Subjective:  Alyson Lew is a 35 y.o. female.     Chief Complaint   Patient presents with   • Right Foot - Follow-up       HPI: Patient returns for right foot follow up. She has improved since last visit but has been unable to proceed with PT. She has been ambulating out of the CAM boot. Continues to have burning and tingling at times.    Allergies   Allergen Reactions   • Adhesive Tape Hives   • Latex Hives and Unknown - High Severity       Current Outpatient Medications on File Prior to Visit   Medication Sig Dispense Refill   • albuterol sulfate HFA (PROAIR HFA) 108 (90 Base) MCG/ACT inhaler Inhale 2 puffs Every 4 (Four) Hours As Needed for Wheezing. 1 inhaler 1   • diclofenac (FLECTOR) 1.3 % patch patch Apply 1 patch topically to the appropriate area as directed 2 (Two) Times a Day As Needed (pain). 60 patch 2   • fluticasone (FLONASE) 50 MCG/ACT nasal spray 2 sprays into the nostril(s) as directed by provider Daily. 1 bottle 2   • levothyroxine (LEVO-T) 75 MCG tablet Take 1 tablet by mouth Daily. 30 tablet 1   • lubiprostone (Amitiza) 24 MCG capsule Take 1 capsule by mouth 2 (Two) Times a Day With Meals. 60 capsule 2   • LYRICA 200 MG capsule Take 1 capsule by mouth 3 (Three) Times a Day. 90 capsule 2   • Morphine (MS CONTIN) 30 MG 12 hr tablet Take 1 tablet by mouth 2 (Two) Times a Day. 60 tablet 0   • naproxen (NAPROSYN) 500 MG tablet TAKE ONE (1) TABLET BY MOUTH EVERY TWELVE HOURS WITH FOOD OR MILK AS NEEDED  0   • omeprazole (PrilOSEC) 40 MG capsule Take 1 capsule by mouth Daily. 30 capsule 6   • Pediatric Multiple Vit-C-FA (FLINSTONES GUMMIES OMEGA-3 DHA) chewable tablet FLINSTONES GUMMIES OMEGA-3 DHA CHEW     • tiZANidine (ZANAFLEX) 4 MG tablet Take 1 tablet by mouth Every 8 (Eight) Hours As Needed for Muscle Spasms. 90 tablet 2   • traZODone (DESYREL) 150 MG tablet  "Take 1 tablet by mouth Every Night. 30 tablet 0     No current facility-administered medications on file prior to visit.        Objective   /74 (BP Location: Left arm, Patient Position: Sitting, Cuff Size: Adult)   Pulse 78   Ht 162.6 cm (64\")   Wt 72.6 kg (160 lb)   BMI 27.46 kg/m²     General:   Appearance: appears stated age and healthy    Orientation: AAOx3    Affect: appropriate    Gait: antalgic        Right Foot/Ankle:       Vascular   Dorsalis pedis:  2+  Posterior tibial:  2+  Skin Temperature: warm    Edema Grading:  None  CFT:  < 3 seconds  Pedal Hair Growth:  Present      Neurologic   Right ankle sensation: Allodynia and paresthesia noted to the laceration site.  Vibratory sensation:  Normal  Hot/Cold sensation: normal    Protective Sensation using [place]-Jigna Monofilament:  10  Achilles reflex:  2+      Musculoskeletal   Ecchymosis:  None  Tenderness: dorsal foot    Arch:  Normal      Range of Motion   Foot and ankle ROM within normal limits        Dermatologic   Skin comment: Incision site is well-healed    Assessment/Plan   Alyson was seen today for follow-up.    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 Tendon appears to remain intact. She continues to remain weak and stiff. I would like her to proceed with formal PT and at home exercises. I would like her to return to a regular shoe and discontinue the boot. She is to avoid high impact or excessive activity until comfortable with normal daily activity. She is to return in 2-3 months for reevaluation.     Orders Placed This Encounter   Procedures   • Ambulatory Referral to Physical Therapy Evaluate and treat (12 weeks); Stretching, ROM, Strengthening     Referral Priority:   Routine     Referral Type:   Therapy    "  Referral Reason:   Specialty Services Required     Requested Specialty:   Physical Therapy     Number of Visits Requested:   1          Note is dictated utilizing voice recognition software. Unfortunately this leads to occasional typographical errors. I apologize in advance if the situation occurs. If questions occur please do not hesitate to call our office.

## 2020-05-01 ENCOUNTER — TELEPHONE (OUTPATIENT)
Dept: PHYSICAL THERAPY | Facility: CLINIC | Age: 35
End: 2020-05-01

## 2020-05-01 NOTE — TELEPHONE ENCOUNTER
Called PT TO EXPLAIN THAT DUE TO THE COVID-19 VIRUS CRITERIA WE WILL BE CALLING HER TO SCHEDULE  WHEN WE RAMP UP. PT UNDERSTOOD.

## 2020-05-06 ENCOUNTER — TELEPHONE (OUTPATIENT)
Dept: PHYSICAL THERAPY | Facility: CLINIC | Age: 35
End: 2020-05-06

## 2020-05-06 ENCOUNTER — TREATMENT (OUTPATIENT)
Dept: PHYSICAL THERAPY | Facility: CLINIC | Age: 35
End: 2020-05-06

## 2020-05-06 DIAGNOSIS — M66.271 SPONTANEOUS RUPTURE OF EXTENSOR TENDON OF RIGHT FOOT: Primary | ICD-10-CM

## 2020-05-06 DIAGNOSIS — M79.671 RIGHT FOOT PAIN: ICD-10-CM

## 2020-05-06 PROCEDURE — 97110 THERAPEUTIC EXERCISES: CPT | Performed by: PHYSICAL THERAPIST

## 2020-05-06 PROCEDURE — 97140 MANUAL THERAPY 1/> REGIONS: CPT | Performed by: PHYSICAL THERAPIST

## 2020-05-06 PROCEDURE — 97162 PT EVAL MOD COMPLEX 30 MIN: CPT | Performed by: PHYSICAL THERAPIST

## 2020-05-06 NOTE — PROGRESS NOTES
Physical Therapy Initial Evaluation and Plan of Care    Patient: Alyson Lew   : 1985  Diagnosis/ICD-10 Code:  Spontaneous rupture of extensor tendon of right foot [M66.271]  Referring practitioner: VALERIA Rajan DPM  Date of Initial Visit: 2020  Today's Date: 2020  Patient seen for 1 session           Subjective Questionnaire: LEFS: 7 points      Subjective Evaluation    History of Present Illness  Date of surgery: 2020  Mechanism of injury: Patient presents to physical therapy with orders to address R foot following surgery.   Patient states that a knife fell off the counter and completely ruptured her extensor tendon on her R foot.   She had surgical repair on 20.  She states that she has had a rough recovery since that time and she states that she has some nerve damage as well.       She used a knee scooter for 6 weeks and has now been able to attend physical therapy.  She states that it hurts to fully bear weight on that foot.  She doesn't feel like she has the strength or full function back in her foot/toe.  She feels like she still can't move her toe or walk right on it.        Patient Occupation: Not currently working Pain  Current pain ratin  At best pain ratin  At worst pain rating: 10  Location: R foot  Relieving factors: rest and medications (Has morphine for fibromyalgia)  Aggravating factors: ambulation and standing    Social Support  Lives in: one-story house (One step entry to back porch)  Lives with: young children and significant other    Diagnostic Tests  MRI studies: abnormal    Treatments  No previous or current treatments  Patient Goals  Patient goals for therapy: decreased pain, improved balance, increased motion and increased strength         Objective          Observations     Right Ankle/Foot   Positive for adhesive scar and edema.     Tenderness     Right Ankle/Foot   Tenderness in the anterior ankle, dorsum foot and first metatarsal head.      Neurological Testing     Sensation     Ankle/Foot     Right Ankle/Foot   Hypersensation: light touch    Active Range of Motion     Right Ankle/Foot   Dorsiflexion (ke): 0 degrees   Plantar flexion: 28 degrees   Inversion: 18 degrees   Eversion: 4 degrees   Great toe flexion: 0 degrees with pain  Great toe extension: 0 degrees with pain    Strength/Myotome Testing     Right Ankle/Foot   Dorsiflexion: 3  Plantar flexion: 3  Inversion: 3  Eversion: 3  Great toe flexion: 3  Great toe extension: 2    Additional Strength Details  Pain with all active movement R foot    Ambulation   Weight-Bearing Status   Weight-Bearing Status (Right): weight-bearing as tolerated   Assistive device used: none    Observational Gait   Gait: antalgic   Decreased walking speed, stride length and right stance time.         HEP issued today:  Access Code: [email or web address]   URL: [email or web address]   Date: 05/06/2020   Prepared by: Mary Rosales     Assessment & Plan     Assessment  Impairments: abnormal gait, abnormal muscle firing, abnormal or restricted ROM, activity intolerance, impaired balance, impaired physical strength, lacks appropriate home exercise program, pain with function and weight-bearing intolerance  Assessment details: The patient is a 35 y.o. female who presents to physical therapy today for R foot weakness and pain. Upon initial evaluation, the patient demonstrates the impairments listed above. Due to these impairments, the patient is unable to tolerate prolonged activities or positions due to pain. The patient would benefit from skilled PT services to address functional limitations and impairments and to improve patient quality of life.    Prognosis: fair  Functional Limitations: sleeping, walking, uncomfortable because of pain, sitting, standing and unable to perform repetitive tasks  Goals  Plan Goals: STG's: 3 weeks   Patient will report pain level at worse </= 6/10 for improved tolerance to ADLs and functional  mobility  Patient will require <3 tactile or verbal cues for proper mechanics during completion of her HEP      LTG's: By Discharge  Patient will report pain levels at worst </= 4/10 for improved tolerance to ADLs and functional mobility  Patient will be able to ambulate unlimited distances without an assistive device and no increased pain  Patient will be able to complete single leg stance on stable surface with no UE support, with supervision for durations > 10 seconds on R LE to decrease risk of falls  Patient will report >75% improvement in her ability to tolerate sitting for durations > 60 minutes per reduction in her symptoms   Patient will report improved levels of overall function as demonstrated by an increase in her reported level of function score on the LEFS to >/= 25/80    Patient will report improvement in their tolerance to sleeping and report waking up </= 1x/night per positional discomfort  Patient will require no tactile or verbal cues for proper mechaics during completion of her HEP for final independence     Plan  Therapy options: will be seen for skilled physical therapy services  Planned modality interventions: cryotherapy, electrical stimulation/Russian stimulation, TENS and thermotherapy (hydrocollator packs)  Planned therapy interventions: balance/weight-bearing training, flexibility, functional ROM exercises, gait training, home exercise program, joint mobilization, manual therapy, neuromuscular re-education, soft tissue mobilization, strengthening, stretching and therapeutic activities  Duration in visits: 12  Treatment plan discussed with: patient        History # of Personal Factors and/or Comorbidities: HIGH (3+)  Examination of Body System(s): # of elements: MODERATE (3)  Clinical Presentation: EVOLVING  Clinical Decision Making: MODERATE      Timed:         Manual Therapy:    15     mins  29902;     Therapeutic Exercise:    15     mins  32217;     Neuromuscular Aubrey:        mins   79374;    Therapeutic Activity:          mins  48933;     Gait Training:           mins  63858;     Ultrasound:          mins  94530;    Ionto                                   mins   14910  Self Care                            mins   92771  Canalith Repos         mins 98280      Un-Timed:  Electrical Stimulation:         mins  87003 ( );  Dry Needling          mins self-pay  Traction          mins 02549  Low Eval          Mins  88032  Mod Eval     20     Mins  35833  High Eval                            Mins  89385  Re-Eval                               mins  79244        Timed Treatment:   30   mins   Total Treatment:     50   mins    PT SIGNATURE: Mary Rosales, VIC   DATE TREATMENT INITIATED: 5/6/2020    Initial Certification  Certification Period: 8/4/2020  I certify that the therapy services are furnished while this patient is under my care.  The services outlined above are required by this patient, and will be reviewed every 90 days.     PHYSICIAN: VALERIA Rajan DPM      DATE:     Please sign and return via fax to [phone]. Thank you, [facility].

## 2020-05-13 ENCOUNTER — TREATMENT (OUTPATIENT)
Dept: PHYSICAL THERAPY | Facility: CLINIC | Age: 35
End: 2020-05-13

## 2020-05-13 DIAGNOSIS — M79.671 RIGHT FOOT PAIN: ICD-10-CM

## 2020-05-13 DIAGNOSIS — M66.271 SPONTANEOUS RUPTURE OF EXTENSOR TENDON OF RIGHT FOOT: Primary | ICD-10-CM

## 2020-05-13 PROCEDURE — 97110 THERAPEUTIC EXERCISES: CPT | Performed by: PHYSICAL THERAPIST

## 2020-05-13 PROCEDURE — 97140 MANUAL THERAPY 1/> REGIONS: CPT | Performed by: PHYSICAL THERAPIST

## 2020-05-13 NOTE — PROGRESS NOTES
Physical Therapy Daily Progress Note    VISIT#: 2    Subjective   Alyson Lew reports that she is really hurting today.  The rainy weather has stirred up her fibromyalgia.  She states that she has continued to work on her HEP and she doesn't feel any better with her R foot.  Pain Rating (0-10): 8-9/10    Objective     See Exercise, Manual, and Modality Logs for complete treatment.     Patient Education: Continue working on desensitization    Assessment & Plan     Assessment  Assessment details: Patient continues with moderate hypersensitivity to palpation of dorsum of R foot but she tolerated increased manual therapy today.            Progress per Plan of Care and Progress strengthening /stabilization /functional activity            Timed:         Manual Therapy:    25     mins  16569;     Therapeutic Exercise:    20     mins  87713;     Neuromuscular Aubrey:        mins  45142;    Therapeutic Activity:          mins  88746;     Gait Training:           mins  29436;     Ultrasound:          mins  27835;    Ionto                                   mins   31675  Self Care                            mins   39779  Canalith Repos                   mins  09605    Un-Timed:  Electrical Stimulation:         mins  16704 ( );  Dry Needling          mins self-pay  Traction          mins 58541  Low Eval          Mins  53842  Mod Eval          Mins  44740  High Eval                            Mins  11078  Re-Eval                               mins  94737    Timed Treatment:   45   mins   Total Treatment:     45   mins    Mary Rosales PT  [place] License # [id number]  Physical Therapist

## 2020-05-19 ENCOUNTER — TREATMENT (OUTPATIENT)
Dept: PHYSICAL THERAPY | Facility: CLINIC | Age: 35
End: 2020-05-19

## 2020-05-19 DIAGNOSIS — M66.271 SPONTANEOUS RUPTURE OF EXTENSOR TENDON OF RIGHT FOOT: Primary | ICD-10-CM

## 2020-05-19 DIAGNOSIS — M79.671 RIGHT FOOT PAIN: ICD-10-CM

## 2020-05-19 PROCEDURE — 97110 THERAPEUTIC EXERCISES: CPT | Performed by: PHYSICAL THERAPIST

## 2020-05-19 PROCEDURE — 97140 MANUAL THERAPY 1/> REGIONS: CPT | Performed by: PHYSICAL THERAPIST

## 2020-05-19 NOTE — PROGRESS NOTES
Physical Therapy Daily Progress Note    VISIT#: 3    Subjective   Alyson Lew reports that she has been getting shocking pains into her foot at random times.  She refers to it as misfiring of the nerves.   Today, she is having more pain and difficulty walking.  Pain Rating (0-10): 9    Objective     See Exercise, Manual, and Modality Logs for complete treatment.     Patient Education: Continue current HEP    Assessment & Plan     Assessment  Assessment details: Patient had significant tenderness with all hands on treatment today.   She was unable to tolerate further exercise progression due to high levels of pain.          Progress per Plan of Care and Progress strengthening /stabilization /functional activity            Timed:         Manual Therapy:    25     mins  87754;     Therapeutic Exercise:    20    mins  71865;     Neuromuscular Aubrey:        mins  76259;    Therapeutic Activity:          mins  06473;     Gait Training:           mins  58205;     Ultrasound:          mins  26673;    Ionto                                   mins   29284  Self Care                            mins   09074  Canalith Repos                   mins  35738    Un-Timed:  Electrical Stimulation:         mins  67810 ( );  Dry Needling          mins self-pay  Traction          mins 88369  Low Eval          Mins  98480  Mod Eval          Mins  32033  High Eval                            Mins  16482  Re-Eval                               mins  03689    Timed Treatment:   45   mins   Total Treatment:     45   mins    Mary Rosales PT  [place] License # [id number]  Physical Therapist

## 2020-05-27 ENCOUNTER — TELEPHONE (OUTPATIENT)
Dept: FAMILY MEDICINE CLINIC | Facility: CLINIC | Age: 35
End: 2020-05-27

## 2020-05-27 DIAGNOSIS — K59.03 DRUG-INDUCED CONSTIPATION: Primary | ICD-10-CM

## 2020-05-27 NOTE — TELEPHONE ENCOUNTER
I attempted PA and 2 appeals for Amitiza. All of which were denied by insurance. Pt states that she is only having small BM about once weekly and are painful and sometimes bloody. Pt insists that something be done asap for this as she has been waiting for a month without normal bm. I asked pt if she has ever seen GI, and said that she has not and was never told to for constipation. She wants something to help with this asap that the insurance will pay for. I explained that we have no way of knowing what the insurance will or will not pay for and have been trying our best to get a medication approved.

## 2020-05-27 NOTE — TELEPHONE ENCOUNTER
I called and spoke with patient.  She was very upset and frustrated on the phone.  She reports that it is ridiculous that insurance will not cover the medication and she had been waiting a month with constipation.  I informed her that I am sorry that insurance will not cover the medication but we tried to appeal it with insurance and they still denied it.  She reports that she was given samples of Relistor in the past by us, which is not documented and I have never prescribed for her before.  Patient was very argumentative during conversation.  I informed her that I am sorry that she was not notified of the first denial and prior authorization appeal.  I told her that I will send her in some lactulose to help with constipation and would refer her to gastroenterology.    During the conversation I told informed pt that she needed to be respectful as she was very rude and demeaning during the entire conversation.

## 2020-05-28 NOTE — TELEPHONE ENCOUNTER
"I CALLED PT TO INFORM HER OF REFERRAL. SHE STARTED GOING ON AND ON ABOUT HER DISSATISFACTION WITH THIS WHOLE SITUATION AND THAT SHE IS ONLY BEING REFERRED SINCE THE ASST CAN'T \"DO HER JOB.\" I  STAYED OUT OF THE ARGUMENT AND JUST GAVE HER THE NUMBER TO CALL [facility] FOR AN APPT.   "

## 2020-05-29 ENCOUNTER — TELEPHONE (OUTPATIENT)
Dept: FAMILY MEDICINE CLINIC | Facility: CLINIC | Age: 35
End: 2020-05-29

## 2020-05-29 DIAGNOSIS — K59.03 DRUG-INDUCED CONSTIPATION: ICD-10-CM

## 2020-05-29 RX ORDER — LACTULOSE 20 G/30ML
10 SOLUTION ORAL DAILY
Qty: 450 ML | Refills: 1 | Status: SHIPPED | OUTPATIENT
Start: 2020-05-29 | End: 2020-05-29

## 2020-05-29 NOTE — TELEPHONE ENCOUNTER
Just received pa request for lactulose packets. Not covered. Called pharmacy and liquid is preferred. Mine switched to liquid. Pt notified of change.

## 2020-06-16 ENCOUNTER — TELEPHONE (OUTPATIENT)
Dept: PHYSICAL THERAPY | Facility: CLINIC | Age: 35
End: 2020-06-16

## 2020-06-16 NOTE — TELEPHONE ENCOUNTER
CALLED PT FOR TRACKING-PT STATED HER FATHER HAS HAD A STROKE. SHE IS PLANNING TO RETURN NEXT WEEK AND WILL CALL TO SCHEDULE.

## 2020-06-18 ENCOUNTER — OFFICE VISIT (OUTPATIENT)
Dept: PAIN MEDICINE | Facility: CLINIC | Age: 35
End: 2020-06-18

## 2020-06-18 DIAGNOSIS — Z79.899 OTHER LONG TERM (CURRENT) DRUG THERAPY: ICD-10-CM

## 2020-06-18 DIAGNOSIS — M54.16 LUMBAR RADICULOPATHY: ICD-10-CM

## 2020-06-18 DIAGNOSIS — M25.511 CHRONIC RIGHT SHOULDER PAIN: ICD-10-CM

## 2020-06-18 DIAGNOSIS — G89.29 CHRONIC MIDLINE LOW BACK PAIN, UNSPECIFIED WHETHER SCIATICA PRESENT: Primary | ICD-10-CM

## 2020-06-18 DIAGNOSIS — G89.29 CHRONIC RIGHT SHOULDER PAIN: ICD-10-CM

## 2020-06-18 DIAGNOSIS — M79.7 FIBROMYALGIA: ICD-10-CM

## 2020-06-18 DIAGNOSIS — M25.50 PAIN IN JOINT INVOLVING MULTIPLE SITES: ICD-10-CM

## 2020-06-18 DIAGNOSIS — M79.601 PAIN IN RIGHT ARM: ICD-10-CM

## 2020-06-18 DIAGNOSIS — M54.2 NECK PAIN, CHRONIC: ICD-10-CM

## 2020-06-18 DIAGNOSIS — G89.29 NECK PAIN, CHRONIC: ICD-10-CM

## 2020-06-18 DIAGNOSIS — K59.03 DRUG-INDUCED CONSTIPATION: ICD-10-CM

## 2020-06-18 DIAGNOSIS — M46.1 SACROILIITIS, NOT ELSEWHERE CLASSIFIED (HCC): ICD-10-CM

## 2020-06-18 DIAGNOSIS — M54.50 CHRONIC MIDLINE LOW BACK PAIN, UNSPECIFIED WHETHER SCIATICA PRESENT: Primary | ICD-10-CM

## 2020-06-18 PROCEDURE — 99441 PR PHYS/QHP TELEPHONE EVALUATION 5-10 MIN: CPT | Performed by: PHYSICAL MEDICINE & REHABILITATION

## 2020-06-18 RX ORDER — TIZANIDINE 4 MG/1
4 TABLET ORAL EVERY 8 HOURS PRN
Qty: 90 TABLET | Refills: 11 | Status: SHIPPED | OUTPATIENT
Start: 2020-06-18 | End: 2021-04-09 | Stop reason: SDUPTHER

## 2020-06-18 RX ORDER — LACTULOSE 10 G/15ML
SOLUTION ORAL
COMMUNITY
Start: 2020-05-29 | End: 2021-11-29

## 2020-06-18 RX ORDER — MORPHINE SULFATE 30 MG/1
30 TABLET, FILM COATED, EXTENDED RELEASE ORAL 2 TIMES DAILY
Qty: 60 TABLET | Refills: 0 | Status: SHIPPED | OUTPATIENT
Start: 2020-06-18 | End: 2020-09-17 | Stop reason: SDUPTHER

## 2020-06-18 RX ORDER — MORPHINE SULFATE 30 MG/1
30 TABLET, FILM COATED, EXTENDED RELEASE ORAL 2 TIMES DAILY
Qty: 60 TABLET | Refills: 0 | Status: SHIPPED | OUTPATIENT
Start: 2020-06-18 | End: 2020-07-30

## 2020-06-18 NOTE — PROGRESS NOTES
"Subjective   Alyson Lew is a 35 y.o. female.     all over pain, especially back, neck and bilateral hip pain, right knee. Dz with fibromyalgia and inflammatory arthritis by Rheum, on DMARDs and Lyrica 100mg BID at initial visit with poor control, pain worsening, 10/10 at worst, 7/10 at best, always present, prevents work and housework, burning, sharp, varies. Prior notes reviewed, referred for worsening pain, does not have time for PT. Increased to Lyrica 100mg BID then TID, started Celebrex, Cymbalta 30mg then 60mg qHS, fewer flares but still severe fibromyalgia pain, also LBP and b/l hip pain. Started Norco 5/325mg, mostly taking qHS, some days BID with worsening pain with cold weather, becoming less effective, rotated to Percocet 5/325mg. C/o insomnia. Went to ED with severe pain not controlled with Percocet 5/325mg BID prn, now QID prn. Had gastric sleeve surgery, liquid Norco worked well, restarted Percocet with severe N/V, vomited up complete prescription. Started liquid Norco, working better, but pain worsening, having difficulty with ADLs, increased Norco and Lyrica, then MS-IR 15mg QID prn with pill . Worsening L \"hip\" pain, insomnia, constipation. Requesting NSAID. Had toni. Has FH of autoimmune disorders and MS, hasn't heard from Rheum. Needs PT before having injection, worsening SIJ pain even with PT, had b/l SIJ injections, helped briefly only. Foot nerve pain worsening.       The following portions of the patient's history were reviewed and updated as appropriate: allergies, current medications, past family history, past medical history, past social history, past surgical history and problem list.    Review of Systems   Constitutional: Negative for chills, fatigue and fever.   HENT: Negative for hearing loss and trouble swallowing.    Eyes: Positive for visual disturbance.   Respiratory: Positive for shortness of breath.    Cardiovascular: Negative for chest pain.   Gastrointestinal: " Positive for constipation. Negative for abdominal pain, diarrhea, nausea and vomiting.   Genitourinary: Negative for urinary incontinence.   Musculoskeletal: Positive for arthralgias, back pain and neck pain. Negative for joint swelling and myalgias.   Neurological: Positive for headache. Negative for dizziness, weakness and numbness.       Objective   Physical Exam   Constitutional: She is oriented to person, place, and time. She appears well-developed and well-nourished.   HENT:   Head: Normocephalic and atraumatic.   Eyes: Pupils are equal, round, and reactive to light. EOM are normal.   Neck: Normal range of motion.   Cardiovascular: Normal rate, regular rhythm, normal heart sounds and intact distal pulses.   Pulmonary/Chest: Breath sounds normal.   Abdominal: Soft. Bowel sounds are normal. She exhibits no distension. There is no tenderness.   Musculoskeletal: She exhibits tenderness.   positive gianni finger test, TTP over b/l SIJ, b/l CARINA, and Gaenslens   Neurological: She is alert and oriented to person, place, and time. She has normal strength and normal reflexes. She displays normal reflexes. No sensory deficit.   Psychiatric: She has a normal mood and affect. Her behavior is normal. Thought content normal.         Assessment/Plan   Alyson was seen today for pain.    Diagnoses and all orders for this visit:    Chronic midline low back pain, unspecified whether sciatica present    Lumbar radiculopathy    Neck pain, chronic    Fibromyalgia    Pain in joint involving multiple sites    Pain in right arm    Chronic right shoulder pain    Drug-induced constipation    Sacroiliitis, not elsewhere classified (CMS/HCC)    Other long term (current) drug therapy        INspect reviewed, in order. Low risk. Repeat UDS 7/23/19 in order.  Increased to Lyrica 200mg TID, sedating, numbs her lips, but benefit outweighs side effects.  Cont Cymbalta 60mg qHS, helping, and sleeping better.  Stopped Norco 5/325mg, failed  Percocet 5/325mg, started liquid Norco 7.5/325mg/15ml 15ml QID prn, #1800. Increased to q4h prn, #2700, no longer working. Increased to MS-IR 15mg q6h prn, now MS-Contin 30mg BID, helping a lot more.  Failed Mobic 7.5mg qd - BID prn, failed Celebrex.  Cont Silenor.  Cont Relistor 450mg qdaily, failed Miralax, colace.  Begin RxAlt #2 compounded cream.  Order Flector BID prn.  New referral to Rheum.  Referral to PT for LBP and L hip pain.  Performed b/l SIJ injection.  RTC in 3 months for f/u. Telephone visit, spent 8 minutes discussing her plan of care and medications.

## 2020-07-30 ENCOUNTER — OFFICE VISIT (OUTPATIENT)
Dept: PODIATRY | Facility: CLINIC | Age: 35
End: 2020-07-30

## 2020-07-30 VITALS
SYSTOLIC BLOOD PRESSURE: 102 MMHG | BODY MASS INDEX: 26.63 KG/M2 | HEIGHT: 64 IN | DIASTOLIC BLOOD PRESSURE: 67 MMHG | WEIGHT: 156 LBS | HEART RATE: 89 BPM

## 2020-07-30 DIAGNOSIS — R22.41 LOCALIZED SWELLING OF RIGHT FOOT: ICD-10-CM

## 2020-07-30 DIAGNOSIS — R20.2 PARESTHESIA OF RIGHT FOOT: Primary | ICD-10-CM

## 2020-07-30 PROCEDURE — 99213 OFFICE O/P EST LOW 20 MIN: CPT | Performed by: PODIATRIST

## 2020-07-30 NOTE — PROGRESS NOTES
07/30/2020  Foot and Ankle Surgery - Established Patient/Follow-up  Provider: Dr. Adeel Rajan DPM  Location: [facility]    Subjective:  Alyson Lew is a 35 y.o. female.     Chief Complaint   Patient presents with   • Right Foot - Follow-up       HPI: Patient returns for follow-up on her right foot.  She continues to complain of discomfort and tingling sensations involving the foot with swelling with increased activity.  She states that she has been mostly at home these days and is ambulating barefoot and in sandals mostly.  She states that she is unable to fit in a standard pair of tennis shoes.  She is able to function day-to-day without any significant limitation.    Allergies   Allergen Reactions   • Bleach [Sodium Hypochlorite] Anaphylaxis   • Adhesive Tape Hives   • Latex Hives and Unknown - High Severity       Current Outpatient Medications on File Prior to Visit   Medication Sig Dispense Refill   • albuterol sulfate HFA (PROAIR HFA) 108 (90 Base) MCG/ACT inhaler Inhale 2 puffs Every 4 (Four) Hours As Needed for Wheezing. 1 inhaler 1   • diclofenac (FLECTOR) 1.3 % patch patch Apply 1 patch topically to the appropriate area as directed 2 (Two) Times a Day As Needed (pain). 60 patch 2   • fluticasone (FLONASE) 50 MCG/ACT nasal spray 2 sprays into the nostril(s) as directed by provider Daily. 1 bottle 2   • lactulose (CHRONULAC) 10 GM/15ML solution TAKE 15 MILLILITER (ML) BY MOUTH EVERY DAY     • levothyroxine (LEVO-T) 75 MCG tablet Take 1 tablet by mouth Daily. 30 tablet 1   • lubiprostone (Amitiza) 24 MCG capsule Take 1 capsule by mouth 2 (Two) Times a Day With Meals. 60 capsule 2   • LYRICA 200 MG capsule Take 1 capsule by mouth 3 (Three) Times a Day. 90 capsule 2   • Morphine (MS CONTIN) 30 MG 12 hr tablet Take 1 tablet by mouth 2 (Two) Times a Day. 60 tablet 0   • naproxen (NAPROSYN) 500 MG tablet TAKE ONE (1) TABLET BY MOUTH EVERY TWELVE HOURS WITH FOOD OR MILK AS NEEDED  0   • omeprazole  "(PrilOSEC) 40 MG capsule Take 1 capsule by mouth Daily. 30 capsule 6   • Pediatric Multiple Vit-C-FA (FLINSTONES GUMMIES OMEGA-3 DHA) chewable tablet FLINSTONES GUMMIES OMEGA-3 DHA CHEW     • tiZANidine (ZANAFLEX) 4 MG tablet Take 1 tablet by mouth Every 8 (Eight) Hours As Needed for Muscle Spasms. 90 tablet 11   • traZODone (DESYREL) 150 MG tablet Take 1 tablet by mouth Every Night. 30 tablet 0   • [DISCONTINUED] Morphine (MS CONTIN) 30 MG 12 hr tablet Take 1 tablet by mouth 2 (Two) Times a Day. 60 tablet 0   • [DISCONTINUED] Morphine (MS CONTIN) 30 MG 12 hr tablet Take 1 tablet by mouth 2 (Two) Times a Day. 60 tablet 0     No current facility-administered medications on file prior to visit.        Objective   /67   Pulse 89   Ht 162.6 cm (64\")   Wt 70.8 kg (156 lb)   BMI 26.78 kg/m²     General:   Appearance: appears stated age and healthy    Orientation: AAOx3    Affect: appropriate    Gait: antalgic        Right Foot/Ankle:       Vascular   Dorsalis pedis:  2+  Posterior tibial:  2+  Skin Temperature: warm    Edema Grading:  None  CFT:  < 3 seconds  Pedal Hair Growth:  Present      Neurologic   Right ankle sensation: Allodynia and paresthesia noted to the laceration site.  Vibratory sensation:  Normal  Hot/Cold sensation: normal    Protective Sensation using [place]igna Monofilament:  10  Achilles reflex:  2+      Musculoskeletal   Ecchymosis:  None  Tenderness: Discomfort with palpation  Arch:  Normal      Range of Motion   Foot and ankle ROM within normal limits        Dermatologic   Skin comment: Incision site remains well-healed.  No erythema or edema.  No progressive deformity or instability.  Extension to the great toe is intact.  Muscle strength is normal but with mild guarding    Assessment/Plan   Alyson was seen today for follow-up.    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  She continues to complain of burning and tingling at times with swelling.  Today, her physical exam is relatively unremarkable.  I did explain that the numbness and tingling could be secondary to nerve damage from the actual injury itself.  She understands and agrees.  I explained that the swelling could be in response to nerve damage as well as increased activity with lack of support.  I would like her to consider wearing compression stockings on a daily basis.  I have also reviewed appropriate support and shoes.  We did discuss stretching and strengthening exercises as well.  She has opted to see me on an as-needed basis at this time.  I have asked that she call with any additional questions or concerns    No orders of the defined types were placed in this encounter.         Note is dictated utilizing voice recognition software. Unfortunately this leads to occasional typographical errors. I apologize in advance if the situation occurs. If questions occur please do not hesitate to call our office.

## 2020-08-20 ENCOUNTER — DOCUMENTATION (OUTPATIENT)
Dept: PHYSICAL THERAPY | Facility: CLINIC | Age: 35
End: 2020-08-20

## 2020-08-20 NOTE — PROGRESS NOTES
Discharge Summary  Discharge Summary from Physical Therapy Report      Dates  PT visit: 5/6/20-5/19/20  Number of Visits: 3     Discharge Status of Patient: See MD Note dated 7/30/20.  Patient has not returned for continued care.  Will discharge from physical therapy at this time.    Goals: Partially Met    Discharge Plan: Continue with current home exercise program as instructed  Patient to return to referring/providing physician    Date of Discharge 8/20/20        Mary Rosales, PT  Physical Therapist

## 2020-09-17 ENCOUNTER — OFFICE VISIT (OUTPATIENT)
Dept: PAIN MEDICINE | Facility: CLINIC | Age: 35
End: 2020-09-17

## 2020-09-17 VITALS — WEIGHT: 148 LBS | BODY MASS INDEX: 25.27 KG/M2 | HEIGHT: 64 IN

## 2020-09-17 DIAGNOSIS — M54.16 LUMBAR RADICULOPATHY: ICD-10-CM

## 2020-09-17 DIAGNOSIS — M25.50 PAIN IN JOINT INVOLVING MULTIPLE SITES: ICD-10-CM

## 2020-09-17 DIAGNOSIS — M54.2 NECK PAIN, CHRONIC: ICD-10-CM

## 2020-09-17 DIAGNOSIS — G89.29 NECK PAIN, CHRONIC: ICD-10-CM

## 2020-09-17 DIAGNOSIS — M25.511 CHRONIC RIGHT SHOULDER PAIN: ICD-10-CM

## 2020-09-17 DIAGNOSIS — M79.601 PAIN IN RIGHT ARM: ICD-10-CM

## 2020-09-17 DIAGNOSIS — G89.29 CHRONIC MIDLINE LOW BACK PAIN, UNSPECIFIED WHETHER SCIATICA PRESENT: Primary | ICD-10-CM

## 2020-09-17 DIAGNOSIS — M79.7 FIBROMYALGIA: ICD-10-CM

## 2020-09-17 DIAGNOSIS — M54.50 CHRONIC MIDLINE LOW BACK PAIN, UNSPECIFIED WHETHER SCIATICA PRESENT: Primary | ICD-10-CM

## 2020-09-17 DIAGNOSIS — Z79.899 OTHER LONG TERM (CURRENT) DRUG THERAPY: ICD-10-CM

## 2020-09-17 DIAGNOSIS — M46.1 SACROILIITIS, NOT ELSEWHERE CLASSIFIED (HCC): ICD-10-CM

## 2020-09-17 DIAGNOSIS — G89.29 CHRONIC RIGHT SHOULDER PAIN: ICD-10-CM

## 2020-09-17 PROCEDURE — 99213 OFFICE O/P EST LOW 20 MIN: CPT | Performed by: PHYSICAL MEDICINE & REHABILITATION

## 2020-09-17 RX ORDER — MORPHINE SULFATE 30 MG/1
30 TABLET, FILM COATED, EXTENDED RELEASE ORAL 2 TIMES DAILY
Qty: 60 TABLET | Refills: 0 | Status: SHIPPED | OUTPATIENT
Start: 2020-09-17 | End: 2020-12-17 | Stop reason: SDUPTHER

## 2020-09-17 NOTE — PROGRESS NOTES
"Subjective   Alyson Lew is a 35 y.o. female.     all over pain, especially back, neck and bilateral hip pain, right knee. Dz with fibromyalgia and inflammatory arthritis by Rheum, on DMARDs and Lyrica 100mg BID at initial visit with poor control, pain worsening, 10/10 at worst, 7/10 at best, always present, prevents work and housework, burning, sharp, varies. Prior notes reviewed, referred for worsening pain, does not have time for PT. Increased to Lyrica 100mg BID then TID, started Celebrex, Cymbalta 30mg then 60mg qHS, fewer flares but still severe fibromyalgia pain, also LBP and b/l hip pain. Started Norco 5/325mg, mostly taking qHS, some days BID with worsening pain with cold weather, becoming less effective, rotated to Percocet 5/325mg. C/o insomnia. Went to ED with severe pain not controlled with Percocet 5/325mg BID prn, now QID prn. Had gastric sleeve surgery, liquid Norco worked well, restarted Percocet with severe N/V, vomited up complete prescription. Started liquid Norco, working better, but pain worsening, having difficulty with ADLs, increased Norco and Lyrica, then MS-IR 15mg QID prn with pill . Worsening L \"hip\" pain, insomnia, constipation. Requesting NSAID. Had toni. Has FH of autoimmune disorders and MS, hasn't heard from Rheum. Needs PT before having injection, worsening SIJ pain even with PT, had b/l SIJ injections, helped briefly only. Foot nerve pain worsening.       The following portions of the patient's history were reviewed and updated as appropriate: allergies, current medications, past family history, past medical history, past social history, past surgical history and problem list.    Review of Systems   Constitutional: Negative for chills, fatigue and fever.   HENT: Negative for hearing loss and trouble swallowing.    Eyes: Positive for visual disturbance.   Respiratory: Positive for shortness of breath.    Cardiovascular: Negative for chest pain.   Gastrointestinal: " Positive for constipation. Negative for abdominal pain, diarrhea, nausea and vomiting.   Genitourinary: Negative for urinary incontinence.   Musculoskeletal: Positive for arthralgias, back pain and neck pain. Negative for joint swelling and myalgias.   Neurological: Positive for headache. Negative for dizziness, weakness and numbness.       Objective   Physical Exam   Constitutional: She is oriented to person, place, and time. She appears well-developed and well-nourished.   HENT:   Head: Normocephalic and atraumatic.   Eyes: Pupils are equal, round, and reactive to light.   Neck: Normal range of motion.   Cardiovascular: Normal rate, regular rhythm and normal heart sounds.   Pulmonary/Chest: Breath sounds normal.   Abdominal: Soft. Bowel sounds are normal. She exhibits no distension. There is no abdominal tenderness.   Musculoskeletal: Tenderness present.      Comments: positive gianni finger test, TTP over b/l SIJ, b/l CARINA, and Gaenslens   Neurological: She is alert and oriented to person, place, and time. She has normal reflexes. She displays normal reflexes. No sensory deficit.   Psychiatric: Her behavior is normal. Thought content normal.         Assessment/Plan   Alyson was seen today for fibromyalgia and arthritis.    Diagnoses and all orders for this visit:    Chronic midline low back pain, unspecified whether sciatica present    Neck pain, chronic    Lumbar radiculopathy    Fibromyalgia    Pain in joint involving multiple sites    Pain in right arm    Chronic right shoulder pain    Sacroiliitis, not elsewhere classified (CMS/HCC)    Other long term (current) drug therapy        INspect reviewed, in order. Low risk. Repeat UDS 7/23/19 in order.  Increased to Lyrica 200mg TID, sedating, numbs her lips, but benefit outweighs side effects.  Cont Cymbalta 60mg qHS, helping, and sleeping better.  Stopped Norco 5/325mg, failed Percocet 5/325mg, started liquid Norco 7.5/325mg/15ml 15ml QID prn, #1800. Increased to  q4h prn, #9900, no longer working. Increased to MS-IR 15mg q6h prn, now MS-Contin 30mg BID, helping a lot more.  Failed Mobic 7.5mg qd - BID prn, failed Celebrex.  Cont Silenor.  Cont Relistor 450mg qdaily, failed Miralax, colace.  Begin RxAlt #2 compounded cream.  Order Flector BID prn.  New referral to Rheum.  Referral to PT for LBP and L hip pain.  Performed b/l SIJ injection.  RTC in 3 months for f/u. Telephone visit, spent 6 minutes discussing her plan of care and medications.

## 2020-09-25 RX ORDER — NITROFURANTOIN 25; 75 MG/1; MG/1
100 CAPSULE ORAL 2 TIMES DAILY
Qty: 10 CAPSULE | Refills: 0 | Status: SHIPPED | OUTPATIENT
Start: 2020-09-25 | End: 2020-09-30

## 2020-11-04 DIAGNOSIS — J45.909 ASTHMA, UNSPECIFIED ASTHMA SEVERITY, UNSPECIFIED WHETHER COMPLICATED, UNSPECIFIED WHETHER PERSISTENT: ICD-10-CM

## 2020-11-04 RX ORDER — ALBUTEROL SULFATE 90 UG/1
2 AEROSOL, METERED RESPIRATORY (INHALATION) EVERY 4 HOURS PRN
Qty: 1 G | Refills: 1 | Status: SHIPPED | OUTPATIENT
Start: 2020-11-04 | End: 2020-12-16

## 2020-12-16 DIAGNOSIS — J45.909 ASTHMA, UNSPECIFIED ASTHMA SEVERITY, UNSPECIFIED WHETHER COMPLICATED, UNSPECIFIED WHETHER PERSISTENT: ICD-10-CM

## 2020-12-16 RX ORDER — ALBUTEROL SULFATE 90 UG/1
AEROSOL, METERED RESPIRATORY (INHALATION)
Qty: 18 G | Refills: 1 | Status: SHIPPED | OUTPATIENT
Start: 2020-12-16 | End: 2021-02-05

## 2020-12-17 ENCOUNTER — OFFICE VISIT (OUTPATIENT)
Dept: PAIN MEDICINE | Facility: CLINIC | Age: 35
End: 2020-12-17

## 2020-12-17 VITALS — WEIGHT: 156 LBS | BODY MASS INDEX: 26.63 KG/M2 | HEIGHT: 64 IN

## 2020-12-17 DIAGNOSIS — M25.50 PAIN IN JOINT INVOLVING MULTIPLE SITES: ICD-10-CM

## 2020-12-17 DIAGNOSIS — G89.29 CHRONIC MIDLINE LOW BACK PAIN, UNSPECIFIED WHETHER SCIATICA PRESENT: Primary | ICD-10-CM

## 2020-12-17 DIAGNOSIS — M46.1 SACROILIITIS, NOT ELSEWHERE CLASSIFIED (HCC): ICD-10-CM

## 2020-12-17 DIAGNOSIS — Z79.899 OTHER LONG TERM (CURRENT) DRUG THERAPY: ICD-10-CM

## 2020-12-17 DIAGNOSIS — M54.16 LUMBAR RADICULOPATHY: ICD-10-CM

## 2020-12-17 DIAGNOSIS — M79.7 FIBROMYALGIA: ICD-10-CM

## 2020-12-17 DIAGNOSIS — M79.601 PAIN IN RIGHT ARM: ICD-10-CM

## 2020-12-17 DIAGNOSIS — M25.511 CHRONIC RIGHT SHOULDER PAIN: ICD-10-CM

## 2020-12-17 DIAGNOSIS — M54.50 CHRONIC MIDLINE LOW BACK PAIN, UNSPECIFIED WHETHER SCIATICA PRESENT: Primary | ICD-10-CM

## 2020-12-17 DIAGNOSIS — G89.29 CHRONIC RIGHT SHOULDER PAIN: ICD-10-CM

## 2020-12-17 DIAGNOSIS — G89.29 NECK PAIN, CHRONIC: ICD-10-CM

## 2020-12-17 DIAGNOSIS — K59.03 DRUG-INDUCED CONSTIPATION: ICD-10-CM

## 2020-12-17 DIAGNOSIS — M54.2 NECK PAIN, CHRONIC: ICD-10-CM

## 2020-12-17 PROCEDURE — 99213 OFFICE O/P EST LOW 20 MIN: CPT | Performed by: PHYSICAL MEDICINE & REHABILITATION

## 2020-12-17 RX ORDER — MORPHINE SULFATE 30 MG/1
30 TABLET, FILM COATED, EXTENDED RELEASE ORAL 2 TIMES DAILY
Qty: 60 TABLET | Refills: 0 | Status: SHIPPED | OUTPATIENT
Start: 2020-12-17 | End: 2021-04-09 | Stop reason: SDUPTHER

## 2020-12-17 RX ORDER — MORPHINE SULFATE 30 MG/1
30 TABLET, FILM COATED, EXTENDED RELEASE ORAL 2 TIMES DAILY
Qty: 60 TABLET | Refills: 0 | Status: SHIPPED | OUTPATIENT
Start: 2020-12-17 | End: 2021-03-12 | Stop reason: SDUPTHER

## 2020-12-17 NOTE — PROGRESS NOTES
"Subjective   Alyson Lew is a 35 y.o. female.     all over pain, especially back, neck and bilateral hip pain, right knee. Dz with fibromyalgia and inflammatory arthritis by Rheum, on DMARDs and Lyrica 100mg BID at initial visit with poor control, pain worsening, 10/10 at worst, 7/10 at best, always present, prevents work and housework, burning, sharp, varies. Prior notes reviewed, referred for worsening pain, does not have time for PT. Increased to Lyrica 100mg BID then TID, started Celebrex, Cymbalta 30mg then 60mg qHS, fewer flares but still severe fibromyalgia pain, also LBP and b/l hip pain. Started Norco 5/325mg, mostly taking qHS, some days BID with worsening pain with cold weather, becoming less effective, rotated to Percocet 5/325mg. C/o insomnia. Went to ED with severe pain not controlled with Percocet 5/325mg BID prn, now QID prn. Had gastric sleeve surgery, liquid Norco worked well, restarted Percocet with severe N/V, vomited up complete prescription. Started liquid Norco, working better, but pain worsening, having difficulty with ADLs, increased Norco and Lyrica, then MS-IR 15mg QID prn with pill . Worsening L \"hip\" pain, insomnia, constipation. Requesting NSAID. Had toni. Has FH of autoimmune disorders and MS, hasn't heard from Rheum. Needs PT before having injection, worsening SIJ pain even with PT, had b/l SIJ injections, helped briefly only. Foot nerve pain worsening. Having more frequent flares of worsening pain with colder weather, but manageable for now.       The following portions of the patient's history were reviewed and updated as appropriate: allergies, current medications, past family history, past medical history, past social history, past surgical history and problem list.    Review of Systems   Constitutional: Negative for chills, fatigue and fever.   HENT: Negative for hearing loss and trouble swallowing.    Eyes: Positive for visual disturbance.   Respiratory: Positive for " shortness of breath.    Cardiovascular: Negative for chest pain.   Gastrointestinal: Positive for constipation. Negative for abdominal pain, diarrhea, nausea and vomiting.   Genitourinary: Negative for urinary incontinence.   Musculoskeletal: Positive for arthralgias, back pain and neck pain. Negative for joint swelling and myalgias.   Neurological: Positive for headache. Negative for dizziness, weakness and numbness.       Objective   Physical Exam   Constitutional: She is oriented to person, place, and time. She appears well-developed and well-nourished.   HENT:   Head: Normocephalic and atraumatic.   Eyes: Pupils are equal, round, and reactive to light.   Neck: Normal range of motion.   Cardiovascular: Normal rate, regular rhythm and normal heart sounds.   Pulmonary/Chest: Breath sounds normal.   Abdominal: Soft. Bowel sounds are normal. She exhibits no distension. There is no abdominal tenderness.   Musculoskeletal: Tenderness present.      Comments: positive gianni finger test, TTP over b/l SIJ, b/l CARINA, and Gaenslens   Neurological: She is alert and oriented to person, place, and time. She has normal reflexes. She displays normal reflexes. No sensory deficit.   Psychiatric: Her behavior is normal. Thought content normal.         Assessment/Plan   Diagnoses and all orders for this visit:    1. Chronic midline low back pain, unspecified whether sciatica present (Primary)    2. Neck pain, chronic    3. Pain in joint involving multiple sites    4. Pain in right arm    5. Lumbar radiculopathy    6. Fibromyalgia    7. Drug-induced constipation    8. Chronic right shoulder pain    9. Sacroiliitis, not elsewhere classified (CMS/HCC)    10. Other long term (current) drug therapy        INspect reviewed, in order. Low risk. Repeat UDS 7/23/19 in order.  Increased to Lyrica 200mg TID, sedating, numbs her lips, but benefit outweighs side effects.  Cont Cymbalta 60mg qHS, helping, and sleeping better.  Stopped Norco  5/325mg, failed Percocet 5/325mg, started liquid Norco 7.5/325mg/15ml 15ml QID prn, #1800. Increased to q4h prn, #2700, no longer working. Increased to MS-IR 15mg q6h prn, now MS-Contin 30mg BID, helping a lot more.  Failed Mobic 7.5mg qd - BID prn, failed Celebrex.  Cont Silenor.  Cont Relistor 450mg qdaily, failed Miralax, colace.  Begin RxAlt #2 compounded cream.  Ordered Flector BID prn.  New referral to Rheum.  Referral to PT for LBP and L hip pain.  Performed b/l SIJ injection.  RTC in 3 months for f/u. Telephone visit, spent 5 minutes discussing her plan of care and medications.

## 2021-02-05 DIAGNOSIS — J45.909 ASTHMA, UNSPECIFIED ASTHMA SEVERITY, UNSPECIFIED WHETHER COMPLICATED, UNSPECIFIED WHETHER PERSISTENT: ICD-10-CM

## 2021-02-05 RX ORDER — ALBUTEROL SULFATE 90 UG/1
AEROSOL, METERED RESPIRATORY (INHALATION)
Qty: 18 G | Refills: 1 | Status: SHIPPED | OUTPATIENT
Start: 2021-02-05

## 2021-03-12 DIAGNOSIS — G89.29 CHRONIC MIDLINE LOW BACK PAIN, UNSPECIFIED WHETHER SCIATICA PRESENT: ICD-10-CM

## 2021-03-12 DIAGNOSIS — M54.50 CHRONIC MIDLINE LOW BACK PAIN, UNSPECIFIED WHETHER SCIATICA PRESENT: ICD-10-CM

## 2021-03-14 RX ORDER — MORPHINE SULFATE 30 MG/1
30 TABLET, FILM COATED, EXTENDED RELEASE ORAL 2 TIMES DAILY
Qty: 60 TABLET | Refills: 0 | Status: SHIPPED | OUTPATIENT
Start: 2021-03-14 | End: 2021-04-09 | Stop reason: SDUPTHER

## 2021-03-16 ENCOUNTER — APPOINTMENT (OUTPATIENT)
Dept: PAIN MEDICINE | Facility: CLINIC | Age: 36
End: 2021-03-16

## 2021-04-09 ENCOUNTER — OFFICE VISIT (OUTPATIENT)
Dept: PAIN MEDICINE | Facility: CLINIC | Age: 36
End: 2021-04-09

## 2021-04-09 VITALS — BODY MASS INDEX: 26.63 KG/M2 | WEIGHT: 156 LBS | RESPIRATION RATE: 16 BRPM | HEIGHT: 64 IN

## 2021-04-09 DIAGNOSIS — M54.2 NECK PAIN, CHRONIC: ICD-10-CM

## 2021-04-09 DIAGNOSIS — M79.601 PAIN IN RIGHT ARM: ICD-10-CM

## 2021-04-09 DIAGNOSIS — M79.7 FIBROMYALGIA: ICD-10-CM

## 2021-04-09 DIAGNOSIS — G89.29 CHRONIC MIDLINE LOW BACK PAIN, UNSPECIFIED WHETHER SCIATICA PRESENT: Primary | ICD-10-CM

## 2021-04-09 DIAGNOSIS — Z79.899 OTHER LONG TERM (CURRENT) DRUG THERAPY: ICD-10-CM

## 2021-04-09 DIAGNOSIS — M25.50 PAIN IN JOINT INVOLVING MULTIPLE SITES: ICD-10-CM

## 2021-04-09 DIAGNOSIS — G89.29 CHRONIC RIGHT SHOULDER PAIN: ICD-10-CM

## 2021-04-09 DIAGNOSIS — M25.511 CHRONIC RIGHT SHOULDER PAIN: ICD-10-CM

## 2021-04-09 DIAGNOSIS — G89.29 NECK PAIN, CHRONIC: ICD-10-CM

## 2021-04-09 DIAGNOSIS — M46.1 SACROILIITIS, NOT ELSEWHERE CLASSIFIED (HCC): ICD-10-CM

## 2021-04-09 DIAGNOSIS — M54.50 CHRONIC MIDLINE LOW BACK PAIN, UNSPECIFIED WHETHER SCIATICA PRESENT: Primary | ICD-10-CM

## 2021-04-09 DIAGNOSIS — M54.16 LUMBAR RADICULOPATHY: ICD-10-CM

## 2021-04-09 PROCEDURE — 99213 OFFICE O/P EST LOW 20 MIN: CPT | Performed by: PHYSICAL MEDICINE & REHABILITATION

## 2021-04-09 RX ORDER — MORPHINE SULFATE 30 MG/1
30 TABLET, FILM COATED, EXTENDED RELEASE ORAL 2 TIMES DAILY
Qty: 60 TABLET | Refills: 0 | Status: SHIPPED | OUTPATIENT
Start: 2021-04-09 | End: 2021-07-28 | Stop reason: SDUPTHER

## 2021-04-09 RX ORDER — MORPHINE SULFATE 30 MG/1
30 TABLET, FILM COATED, EXTENDED RELEASE ORAL 2 TIMES DAILY
Qty: 60 TABLET | Refills: 0 | Status: SHIPPED | OUTPATIENT
Start: 2021-04-09 | End: 2021-07-28

## 2021-04-09 RX ORDER — TIZANIDINE 4 MG/1
4 TABLET ORAL EVERY 8 HOURS PRN
Qty: 90 TABLET | Refills: 11 | Status: SHIPPED | OUTPATIENT
Start: 2021-04-09 | End: 2021-08-10 | Stop reason: SDUPTHER

## 2021-04-09 RX ORDER — MORPHINE SULFATE 30 MG/1
30 TABLET, FILM COATED, EXTENDED RELEASE ORAL 2 TIMES DAILY
Qty: 60 TABLET | Refills: 0 | Status: SHIPPED | OUTPATIENT
Start: 2021-04-09 | End: 2021-05-09

## 2021-04-09 NOTE — PROGRESS NOTES
"Subjective   Alyson Lew is a 36 y.o. female.     all over pain, especially back, neck and bilateral hip pain, right knee. Dz with fibromyalgia and inflammatory arthritis by Rheum, on DMARDs and Lyrica 100mg BID at initial visit with poor control, pain worsening, 10/10 at worst, 7/10 at best, always present, prevents work and housework, burning, sharp, varies. Prior notes reviewed, referred for worsening pain, does not have time for PT. Increased to Lyrica 100mg BID then TID, started Celebrex, Cymbalta 30mg then 60mg qHS, fewer flares but still severe fibromyalgia pain, also LBP and b/l hip pain. Started Norco 5/325mg, mostly taking qHS, some days BID with worsening pain with cold weather, becoming less effective, rotated to Percocet 5/325mg. C/o insomnia. Went to ED with severe pain not controlled with Percocet 5/325mg BID prn, now QID prn. Had gastric sleeve surgery, liquid Norco worked well, restarted Percocet with severe N/V, vomited up complete prescription. Started liquid Norco, working better, but pain worsening, having difficulty with ADLs, increased Norco and Lyrica, then MS-IR 15mg QID prn with pill . Worsening L \"hip\" pain, insomnia, constipation. Requesting NSAID. Had toni. Has FH of autoimmune disorders and MS, hasn't heard from Rheum. Needs PT before having injection, worsening SIJ pain even with PT, had b/l SIJ injections, helped briefly only. Foot nerve pain worsening. Having more frequent flares of worsening pain with colder weather, but manageable for now.       The following portions of the patient's history were reviewed and updated as appropriate: allergies, current medications, past family history, past medical history, past social history, past surgical history and problem list.    Review of Systems   Constitutional: Negative for chills, fatigue and fever.   HENT: Negative for hearing loss and trouble swallowing.    Eyes: Positive for visual disturbance.   Respiratory: Positive for " shortness of breath.    Cardiovascular: Negative for chest pain.   Gastrointestinal: Positive for constipation. Negative for abdominal pain, diarrhea, nausea and vomiting.   Genitourinary: Negative for urinary incontinence.   Musculoskeletal: Positive for arthralgias, back pain and neck pain. Negative for joint swelling and myalgias.   Neurological: Positive for headache. Negative for dizziness, weakness and numbness.       Objective   Physical Exam   Constitutional: She is oriented to person, place, and time. She appears well-developed and well-nourished.   HENT:   Head: Normocephalic and atraumatic.   Eyes: Pupils are equal, round, and reactive to light.   Cardiovascular: Normal rate, regular rhythm and normal heart sounds.   Pulmonary/Chest: Breath sounds normal.   Abdominal: Soft. Bowel sounds are normal. She exhibits no distension. There is no abdominal tenderness.   Musculoskeletal: Tenderness present.      Comments: positive gianni finger test, TTP over b/l SIJ, b/l CARINA, and Gaenslens   Neurological: She is alert and oriented to person, place, and time. She has normal reflexes. She displays normal reflexes. No sensory deficit.   Psychiatric: Her behavior is normal. Thought content normal.         Assessment/Plan   Diagnoses and all orders for this visit:    1. Chronic midline low back pain, unspecified whether sciatica present (Primary)    2. Neck pain, chronic    3. Fibromyalgia    4. Lumbar radiculopathy    5. Other long term (current) drug therapy    6. Pain in joint involving multiple sites    7. Pain in right arm    8. Sacroiliitis, not elsewhere classified (CMS/HCC)    9. Chronic right shoulder pain        INspect reviewed, in order. Low risk. Repeat UDS 7/23/19 in order.  Increased to Lyrica 200mg TID, sedating, numbs her lips, but benefit outweighs side effects.  Cont Cymbalta 60mg qHS, helping, and sleeping better.  Stopped Norco 5/325mg, failed Percocet 5/325mg, started liquid Norco 7.5/325mg/15ml  15ml QID prn, #1800. Increased to q4h prn, #2700, no longer working. Increased to MS-IR 15mg q6h prn, now MS-Contin 30mg BID, helping a lot more.  Failed Mobic 7.5mg qd - BID prn, failed Celebrex.  Cont Silenor.  Cont Relistor 450mg qdaily, failed Miralax, colace.  Began RxAlt #2 compounded cream.  Ordered Flector BID prn.  New referral to Rheum.  Referral to PT for LBP and L hip pain.  Performed b/l SIJ injection.  RTC in 3 months for f/u. Telephone visit, spent 6 minutes discussing her plan of care and medications.

## 2021-04-15 ENCOUNTER — HOSPITAL ENCOUNTER (EMERGENCY)
Facility: HOSPITAL | Age: 36
Discharge: HOME | End: 2021-04-15
Attending: EMERGENCY MEDICINE | Admitting: EMERGENCY MEDICINE

## 2021-04-15 ENCOUNTER — APPOINTMENT (OUTPATIENT)
Dept: GENERAL RADIOLOGY | Facility: HOSPITAL | Age: 36
End: 2021-04-15

## 2021-04-15 VITALS
RESPIRATION RATE: 15 BRPM | HEART RATE: 71 BPM | WEIGHT: 156 LBS | SYSTOLIC BLOOD PRESSURE: 104 MMHG | HEIGHT: 64 IN | DIASTOLIC BLOOD PRESSURE: 51 MMHG | BODY MASS INDEX: 26.63 KG/M2 | OXYGEN SATURATION: 99 % | TEMPERATURE: 98.3 F

## 2021-04-15 DIAGNOSIS — R07.9 CHEST PAIN, UNSPECIFIED TYPE: Primary | ICD-10-CM

## 2021-04-15 DIAGNOSIS — R00.2 PALPITATIONS: ICD-10-CM

## 2021-04-15 LAB
ANION GAP SERPL CALCULATED.3IONS-SCNC: 12 MMOL/L (ref 5–15)
BASOPHILS # BLD AUTO: 0 10*3/MM3 (ref 0–0.2)
BASOPHILS NFR BLD AUTO: 0.8 % (ref 0–1.5)
BUN SERPL-MCNC: 14 MG/DL (ref 6–20)
BUN/CREAT SERPL: 23 (ref 7–25)
CALCIUM SPEC-SCNC: 9.1 MG/DL (ref 8.6–10.5)
CHLORIDE SERPL-SCNC: 103 MMOL/L (ref 98–107)
CO2 SERPL-SCNC: 24 MMOL/L (ref 22–29)
CREAT SERPL-MCNC: 0.61 MG/DL (ref 0.57–1)
DEPRECATED RDW RBC AUTO: 45.9 FL (ref 37–54)
EOSINOPHIL # BLD AUTO: 0.1 10*3/MM3 (ref 0–0.4)
EOSINOPHIL NFR BLD AUTO: 2.2 % (ref 0.3–6.2)
ERYTHROCYTE [DISTWIDTH] IN BLOOD BY AUTOMATED COUNT: 16.5 % (ref 12.3–15.4)
GFR SERPL CREATININE-BSD FRML MDRD: 112 ML/MIN/1.73
GLUCOSE SERPL-MCNC: 123 MG/DL (ref 65–99)
HCT VFR BLD AUTO: 28.5 % (ref 34–46.6)
HGB BLD-MCNC: 9.1 G/DL (ref 12–15.9)
HOLD SPECIMEN: NORMAL
HOLD SPECIMEN: NORMAL
LYMPHOCYTES # BLD AUTO: 2.6 10*3/MM3 (ref 0.7–3.1)
LYMPHOCYTES NFR BLD AUTO: 41.3 % (ref 19.6–45.3)
MCH RBC QN AUTO: 25.6 PG (ref 26.6–33)
MCHC RBC AUTO-ENTMCNC: 31.9 G/DL (ref 31.5–35.7)
MCV RBC AUTO: 80.2 FL (ref 79–97)
MONOCYTES # BLD AUTO: 0.7 10*3/MM3 (ref 0.1–0.9)
MONOCYTES NFR BLD AUTO: 10.8 % (ref 5–12)
NEUTROPHILS NFR BLD AUTO: 2.9 10*3/MM3 (ref 1.7–7)
NEUTROPHILS NFR BLD AUTO: 44.9 % (ref 42.7–76)
NRBC BLD AUTO-RTO: 0.1 /100 WBC (ref 0–0.2)
PLATELET # BLD AUTO: 328 10*3/MM3 (ref 140–450)
PMV BLD AUTO: 7.2 FL (ref 6–12)
POTASSIUM SERPL-SCNC: 3.8 MMOL/L (ref 3.5–5.2)
RBC # BLD AUTO: 3.55 10*6/MM3 (ref 3.77–5.28)
SODIUM SERPL-SCNC: 139 MMOL/L (ref 136–145)
TROPONIN T SERPL-MCNC: <0.01 NG/ML (ref 0–0.03)
WBC # BLD AUTO: 6.4 10*3/MM3 (ref 3.4–10.8)
WHOLE BLOOD HOLD SPECIMEN: NORMAL
WHOLE BLOOD HOLD SPECIMEN: NORMAL

## 2021-04-15 PROCEDURE — 80048 BASIC METABOLIC PNL TOTAL CA: CPT | Performed by: EMERGENCY MEDICINE

## 2021-04-15 PROCEDURE — 85025 COMPLETE CBC W/AUTO DIFF WBC: CPT | Performed by: EMERGENCY MEDICINE

## 2021-04-15 PROCEDURE — 93005 ELECTROCARDIOGRAM TRACING: CPT | Performed by: EMERGENCY MEDICINE

## 2021-04-15 PROCEDURE — 71045 X-RAY EXAM CHEST 1 VIEW: CPT

## 2021-04-15 PROCEDURE — 99285 EMERGENCY DEPT VISIT HI MDM: CPT

## 2021-04-15 PROCEDURE — 84484 ASSAY OF TROPONIN QUANT: CPT | Performed by: EMERGENCY MEDICINE

## 2021-04-15 PROCEDURE — 93005 ELECTROCARDIOGRAM TRACING: CPT

## 2021-04-15 PROCEDURE — 99284 EMERGENCY DEPT VISIT MOD MDM: CPT

## 2021-04-15 RX ORDER — SODIUM CHLORIDE 0.9 % (FLUSH) 0.9 %
10 SYRINGE (ML) INJECTION AS NEEDED
Status: DISCONTINUED | OUTPATIENT
Start: 2021-04-15 | End: 2021-04-15 | Stop reason: HOSPADM

## 2021-04-15 RX ADMIN — SODIUM CHLORIDE 500 ML: 9 INJECTION, SOLUTION INTRAVENOUS at 03:19

## 2021-04-15 NOTE — ED PROVIDER NOTES
Subjective   Patient is a 36-year-old female complaint sharp pain in her chest for the past 1 hour.  She also complains of palpitations.  She has cough fever shortness of breath vomiting or other complaint.  She states she has minimal symptoms at this time.  The pain is minimal and nearly gone.          Review of Systems  Negative for headache ears no cough fever shortness of breath abdominal pain vomiting diarrhea dysuria achiness weight loss or other complaint.  A complete review of systems was obtained and is otherwise negative  Past Medical History:   Diagnosis Date   • Allergic    • Anemia    • Anxiety    • Arthritis    • Asthma    • Cancer (CMS/HCC)     cervical 2008   • Chronic constipation    • Chronic pain disorder    • Depression    • Disease of thyroid gland    • Extremity pain    • Fibromyalgia, primary    • Headache    • Hx of migraines    • Injury of back    • Joint pain    • Low back pain    • Neck pain    • Peripheral neuropathy    • PTSD (post-traumatic stress disorder)    • Shortness of breath    • Stroke (CMS/HCC)     Mini heat stroke   • Tachycardia        Allergies   Allergen Reactions   • Bleach [Sodium Hypochlorite] Anaphylaxis   • Adhesive Tape Hives   • Latex Hives and Unknown - High Severity       Past Surgical History:   Procedure Laterality Date   • CARDIAC ABLATION  02/11/2020   • CARDIAC CATHETERIZATION N/A 11/5/2019    Procedure: Coronary angiography;  Surgeon: Leo Fagan MD;  Location: [facility] CATH INVASIVE LOCATION;  Service: Cardiovascular   • CARDIAC CATHETERIZATION Left 11/5/2019    Procedure: Cardiac Catheterization/Vascular Study;  Surgeon: Leo Fagan MD;  Location: [facility] CATH INVASIVE LOCATION;  Service: Cardiovascular   • CARDIAC CATHETERIZATION N/A 11/5/2019    Procedure: Left ventriculography;  Surgeon: Leo Fagan MD;  Location: [facility] CATH INVASIVE LOCATION;  Service: Cardiovascular   • CARDIAC ELECTROPHYSIOLOGY PROCEDURE N/A 2/11/2020    Procedure: EP/Ablation;   Surgeon: Luther Walden MD;  Location: [facility] CATH INVASIVE LOCATION;  Service: Cardiovascular;  Laterality: N/A;   •  SECTION      x2   • CHOLECYSTECTOMY     • ENDOSCOPY     • GASTRIC SLEEVE LAPAROSCOPIC     • HAND SURGERY     • PERONEAL TENDON EXPLORATION Right 2020    Procedure: EXTENSOR HALLICUS LONGUS REPAIR AND NERVE DECOMPRESSION;  Surgeon: VALERIA Rajan DPM;  Location: [facility] MAIN OR;  Service: Podiatry;  Laterality: Right;   • TUBAL ABDOMINAL LIGATION         Family History   Problem Relation Age of Onset   • No Known Problems Mother    • Cancer Father    • Coronary artery disease Father    • Diabetes Father    • Stroke Father    • Heart attack Father    • Mental illness Brother    • Mental illness Son        Social History     Socioeconomic History   • Marital status:      Spouse name: Not on file   • Number of children: Not on file   • Years of education: Not on file   • Highest education level: Not on file   Tobacco Use   • Smoking status: Never Smoker   • Smokeless tobacco: Never Used   • Tobacco comment: Pt states she has never smoked   Vaping Use   • Vaping Use: Never used   Substance and Sexual Activity   • Alcohol use: No   • Drug use: No   • Sexual activity: Defer           Objective   Physical Exam  HEENT exam shows TMs to be clear.  Oropharynx clear moist.  Sclera nonicteric.  Neck has no adenopathy JVD or bruits.  Lungs are clear.  Heart has a regular rate rhythm without murmur rub or gallop.  Chest is nontender.  Abdomen is soft nontender.  Patient has normal bowel sounds without rebound or guarding.  Back has no CVA tenderness.  Extremity exam is unremarkable.  Procedures     My EKG interpretation shows normal sinus rhythm with no acute ST change      ED Course      Results for orders placed or performed during the hospital encounter of 04/15/21   Basic Metabolic Panel    Specimen: Blood   Result Value Ref Range    Glucose 123 (H) 65 - 99 mg/dL    BUN 14 6 - 20  mg/dL    Creatinine 0.61 0.57 - 1.00 mg/dL    Sodium 139 136 - 145 mmol/L    Potassium 3.8 3.5 - 5.2 mmol/L    Chloride 103 98 - 107 mmol/L    CO2 24.0 22.0 - 29.0 mmol/L    Calcium 9.1 8.6 - 10.5 mg/dL    eGFR Non African Amer 112 >60 mL/min/1.73    BUN/Creatinine Ratio 23.0 7.0 - 25.0    Anion Gap 12.0 5.0 - 15.0 mmol/L   Troponin    Specimen: Blood   Result Value Ref Range    Troponin T <0.010 0.000 - 0.030 ng/mL   CBC Auto Differential    Specimen: Blood   Result Value Ref Range    WBC 6.40 3.40 - 10.80 10*3/mm3    RBC 3.55 (L) 3.77 - 5.28 10*6/mm3    Hemoglobin 9.1 (L) 12.0 - 15.9 g/dL    Hematocrit 28.5 (L) 34.0 - 46.6 %    MCV 80.2 79.0 - 97.0 fL    MCH 25.6 (L) 26.6 - 33.0 pg    MCHC 31.9 31.5 - 35.7 g/dL    RDW 16.5 (H) 12.3 - 15.4 %    RDW-SD 45.9 37.0 - 54.0 fl    MPV 7.2 6.0 - 12.0 fL    Platelets 328 140 - 450 10*3/mm3    Neutrophil % 44.9 42.7 - 76.0 %    Lymphocyte % 41.3 19.6 - 45.3 %    Monocyte % 10.8 5.0 - 12.0 %    Eosinophil % 2.2 0.3 - 6.2 %    Basophil % 0.8 0.0 - 1.5 %    Neutrophils, Absolute 2.90 1.70 - 7.00 10*3/mm3    Lymphocytes, Absolute 2.60 0.70 - 3.10 10*3/mm3    Monocytes, Absolute 0.70 0.10 - 0.90 10*3/mm3    Eosinophils, Absolute 0.10 0.00 - 0.40 10*3/mm3    Basophils, Absolute 0.00 0.00 - 0.20 10*3/mm3    nRBC 0.1 0.0 - 0.2 /100 WBC   ECG 12 Lead   Result Value Ref Range    QT Interval 342 ms   Light Blue Top   Result Value Ref Range    Extra Tube hold for add-on    Green Top (Gel)   Result Value Ref Range    Extra Tube Hold for add-ons.    Lavender Top   Result Value Ref Range    Extra Tube hold for add-on    Gold Top - SST   Result Value Ref Range    Extra Tube Hold for add-ons.      No radiology results for the last day                                         MDM  Number of Diagnoses or Management Options  Diagnosis management comments: Patient is benign physical exam.  She has no evidence of acute coronary syndrome based on EKG and troponin.  Metabolic panel is normal.  There  is no evidence of acute infectious process including pneumonia.  She is pain-free on reexamination.  She will be discharged to follow with MD for recheck as needed.       Amount and/or Complexity of Data Reviewed  Clinical lab tests: reviewed  Tests in the medicine section of CPT®: reviewed    Risk of Complications, Morbidity, and/or Mortality  Presenting problems: high  Diagnostic procedures: high  Management options: high    Patient Progress  Patient progress: stable      Final diagnoses:   Chest pain, unspecified type   Palpitations       ED Disposition  ED Disposition     ED Disposition Condition Comment    Discharge Stable           No follow-up provider specified.       Medication List      No changes were made to your prescriptions during this visit.          Jonn Moran MD  04/15/21 9412

## 2021-04-15 NOTE — ED NOTES
"Pt reports having had a substernal \"heavy\" chest pain that lasted for approx. 30-40 mins. Pt reports no chest pain once she arrived in the ED. Some cardiac hx with similar but not this level of intensity. Pt denies soa.      Juanita Morales RN  04/15/21 0321    "

## 2021-04-16 ENCOUNTER — HOSPITAL ENCOUNTER (EMERGENCY)
Facility: HOSPITAL | Age: 36
Discharge: HOME | End: 2021-04-16
Attending: EMERGENCY MEDICINE | Admitting: EMERGENCY MEDICINE

## 2021-04-16 ENCOUNTER — APPOINTMENT (OUTPATIENT)
Dept: MRI IMAGING | Facility: HOSPITAL | Age: 36
End: 2021-04-16

## 2021-04-16 ENCOUNTER — OFFICE VISIT (OUTPATIENT)
Dept: FAMILY MEDICINE CLINIC | Facility: CLINIC | Age: 36
End: 2021-04-16

## 2021-04-16 VITALS
RESPIRATION RATE: 13 BRPM | WEIGHT: 171 LBS | OXYGEN SATURATION: 98 % | HEART RATE: 67 BPM | TEMPERATURE: 97.8 F | DIASTOLIC BLOOD PRESSURE: 57 MMHG | BODY MASS INDEX: 30.3 KG/M2 | SYSTOLIC BLOOD PRESSURE: 112 MMHG | HEIGHT: 63 IN

## 2021-04-16 VITALS
DIASTOLIC BLOOD PRESSURE: 74 MMHG | OXYGEN SATURATION: 100 % | TEMPERATURE: 97.3 F | BODY MASS INDEX: 29.7 KG/M2 | SYSTOLIC BLOOD PRESSURE: 124 MMHG | WEIGHT: 173 LBS | HEART RATE: 65 BPM

## 2021-04-16 DIAGNOSIS — R41.0 CONFUSION: Primary | ICD-10-CM

## 2021-04-16 DIAGNOSIS — G43.809 HEADACHE, VARIANT MIGRAINE: Primary | ICD-10-CM

## 2021-04-16 DIAGNOSIS — I99.8 FLUCTUATING BLOOD PRESSURE: ICD-10-CM

## 2021-04-16 LAB
ALBUMIN SERPL-MCNC: 4.1 G/DL (ref 3.5–5.2)
ALBUMIN/GLOB SERPL: 1.3 G/DL
ALP SERPL-CCNC: 45 U/L (ref 39–117)
ALT SERPL W P-5'-P-CCNC: 7 U/L (ref 1–33)
AMMONIA BLD-SCNC: 10 UMOL/L (ref 11–51)
AMPHET+METHAMPHET UR QL: NEGATIVE
ANION GAP SERPL CALCULATED.3IONS-SCNC: 11 MMOL/L (ref 5–15)
AST SERPL-CCNC: 12 U/L (ref 1–32)
B-HCG UR QL: NEGATIVE
BARBITURATES UR QL SCN: NEGATIVE
BASOPHILS # BLD AUTO: 0 10*3/MM3 (ref 0–0.2)
BASOPHILS NFR BLD AUTO: 0.9 % (ref 0–1.5)
BENZODIAZ UR QL SCN: NEGATIVE
BILIRUB SERPL-MCNC: 0.2 MG/DL (ref 0–1.2)
BILIRUB UR QL STRIP: NEGATIVE
BUN SERPL-MCNC: 11 MG/DL (ref 6–20)
BUN/CREAT SERPL: 16.2 (ref 7–25)
CALCIUM SPEC-SCNC: 8.6 MG/DL (ref 8.6–10.5)
CANNABINOIDS SERPL QL: NEGATIVE
CHLORIDE SERPL-SCNC: 104 MMOL/L (ref 98–107)
CLARITY UR: CLEAR
CO2 SERPL-SCNC: 23 MMOL/L (ref 22–29)
COCAINE UR QL: NEGATIVE
COLOR UR: YELLOW
CREAT SERPL-MCNC: 0.68 MG/DL (ref 0.57–1)
DEPRECATED RDW RBC AUTO: 45.9 FL (ref 37–54)
EOSINOPHIL # BLD AUTO: 0.1 10*3/MM3 (ref 0–0.4)
EOSINOPHIL NFR BLD AUTO: 2.1 % (ref 0.3–6.2)
ERYTHROCYTE [DISTWIDTH] IN BLOOD BY AUTOMATED COUNT: 16.4 % (ref 12.3–15.4)
ERYTHROCYTE [SEDIMENTATION RATE] IN BLOOD: 26 MM/HR (ref 0–20)
GFR SERPL CREATININE-BSD FRML MDRD: 98 ML/MIN/1.73
GLOBULIN UR ELPH-MCNC: 3.2 GM/DL
GLUCOSE BLDC GLUCOMTR-MCNC: 76 MG/DL (ref 70–105)
GLUCOSE SERPL-MCNC: 90 MG/DL (ref 65–99)
GLUCOSE UR STRIP-MCNC: NEGATIVE MG/DL
HCT VFR BLD AUTO: 27.8 % (ref 34–46.6)
HGB BLD-MCNC: 9.1 G/DL (ref 12–15.9)
HGB UR QL STRIP.AUTO: NEGATIVE
KETONES UR QL STRIP: NEGATIVE
LEUKOCYTE ESTERASE UR QL STRIP.AUTO: NEGATIVE
LYMPHOCYTES # BLD AUTO: 1.2 10*3/MM3 (ref 0.7–3.1)
LYMPHOCYTES NFR BLD AUTO: 23.8 % (ref 19.6–45.3)
MCH RBC QN AUTO: 26 PG (ref 26.6–33)
MCHC RBC AUTO-ENTMCNC: 32.7 G/DL (ref 31.5–35.7)
MCV RBC AUTO: 79.3 FL (ref 79–97)
METHADONE UR QL SCN: NEGATIVE
MONOCYTES # BLD AUTO: 0.6 10*3/MM3 (ref 0.1–0.9)
MONOCYTES NFR BLD AUTO: 11.4 % (ref 5–12)
NEUTROPHILS NFR BLD AUTO: 3.2 10*3/MM3 (ref 1.7–7)
NEUTROPHILS NFR BLD AUTO: 61.8 % (ref 42.7–76)
NITRITE UR QL STRIP: NEGATIVE
NRBC BLD AUTO-RTO: 0 /100 WBC (ref 0–0.2)
OPIATES UR QL: POSITIVE
OXYCODONE UR QL SCN: NEGATIVE
PH UR STRIP.AUTO: 5.5 [PH] (ref 5–8)
PLATELET # BLD AUTO: 332 10*3/MM3 (ref 140–450)
PMV BLD AUTO: 7.1 FL (ref 6–12)
POTASSIUM SERPL-SCNC: 3.9 MMOL/L (ref 3.5–5.2)
PROT SERPL-MCNC: 7.3 G/DL (ref 6–8.5)
PROT UR QL STRIP: NEGATIVE
QT INTERVAL: 359 MS
RBC # BLD AUTO: 3.51 10*6/MM3 (ref 3.77–5.28)
SODIUM SERPL-SCNC: 138 MMOL/L (ref 136–145)
SP GR UR STRIP: 1.01 (ref 1–1.03)
UROBILINOGEN UR QL STRIP: NORMAL
WBC # BLD AUTO: 5.2 10*3/MM3 (ref 3.4–10.8)

## 2021-04-16 PROCEDURE — 25010000002 DIPHENHYDRAMINE PER 50 MG: Performed by: EMERGENCY MEDICINE

## 2021-04-16 PROCEDURE — 80307 DRUG TEST PRSMV CHEM ANLYZR: CPT | Performed by: EMERGENCY MEDICINE

## 2021-04-16 PROCEDURE — 80053 COMPREHEN METABOLIC PANEL: CPT | Performed by: EMERGENCY MEDICINE

## 2021-04-16 PROCEDURE — 99284 EMERGENCY DEPT VISIT MOD MDM: CPT

## 2021-04-16 PROCEDURE — 25010000002 PROCHLORPERAZINE 10 MG/2ML SOLUTION: Performed by: EMERGENCY MEDICINE

## 2021-04-16 PROCEDURE — 85652 RBC SED RATE AUTOMATED: CPT | Performed by: EMERGENCY MEDICINE

## 2021-04-16 PROCEDURE — 70551 MRI BRAIN STEM W/O DYE: CPT

## 2021-04-16 PROCEDURE — P9612 CATHETERIZE FOR URINE SPEC: HCPCS

## 2021-04-16 PROCEDURE — 81003 URINALYSIS AUTO W/O SCOPE: CPT | Performed by: EMERGENCY MEDICINE

## 2021-04-16 PROCEDURE — 81025 URINE PREGNANCY TEST: CPT | Performed by: EMERGENCY MEDICINE

## 2021-04-16 PROCEDURE — 96375 TX/PRO/DX INJ NEW DRUG ADDON: CPT

## 2021-04-16 PROCEDURE — 96374 THER/PROPH/DIAG INJ IV PUSH: CPT

## 2021-04-16 PROCEDURE — 85025 COMPLETE CBC W/AUTO DIFF WBC: CPT | Performed by: EMERGENCY MEDICINE

## 2021-04-16 PROCEDURE — 82962 GLUCOSE BLOOD TEST: CPT

## 2021-04-16 PROCEDURE — 82140 ASSAY OF AMMONIA: CPT | Performed by: EMERGENCY MEDICINE

## 2021-04-16 PROCEDURE — 99214 OFFICE O/P EST MOD 30 MIN: CPT | Performed by: NURSE PRACTITIONER

## 2021-04-16 RX ORDER — MORPHINE SULFATE 30 MG/1
30 TABLET, FILM COATED, EXTENDED RELEASE ORAL ONCE
Status: COMPLETED | OUTPATIENT
Start: 2021-04-16 | End: 2021-04-16

## 2021-04-16 RX ORDER — PROCHLORPERAZINE EDISYLATE 5 MG/ML
10 INJECTION INTRAMUSCULAR; INTRAVENOUS ONCE
Status: COMPLETED | OUTPATIENT
Start: 2021-04-16 | End: 2021-04-16

## 2021-04-16 RX ORDER — PROCHLORPERAZINE MALEATE 10 MG
10 TABLET ORAL EVERY 6 HOURS PRN
Qty: 12 TABLET | Refills: 0 | Status: SHIPPED | OUTPATIENT
Start: 2021-04-16 | End: 2021-12-23

## 2021-04-16 RX ORDER — DIPHENHYDRAMINE HYDROCHLORIDE 50 MG/ML
25 INJECTION INTRAMUSCULAR; INTRAVENOUS ONCE
Status: COMPLETED | OUTPATIENT
Start: 2021-04-16 | End: 2021-04-16

## 2021-04-16 RX ADMIN — MORPHINE SULFATE 30 MG: 30 TABLET, FILM COATED, EXTENDED RELEASE ORAL at 14:33

## 2021-04-16 RX ADMIN — PROCHLORPERAZINE EDISYLATE 10 MG: 5 INJECTION INTRAMUSCULAR; INTRAVENOUS at 13:43

## 2021-04-16 RX ADMIN — DIPHENHYDRAMINE HYDROCHLORIDE 25 MG: 50 INJECTION, SOLUTION INTRAMUSCULAR; INTRAVENOUS at 13:43

## 2021-04-16 RX ADMIN — SODIUM CHLORIDE 500 ML: 9 INJECTION, SOLUTION INTRAVENOUS at 13:43

## 2021-04-16 NOTE — PROGRESS NOTES
Subjective   Alyson Lew is a 36 y.o. female.     Pt is here today with c/o abnormal hypertension and some confusion.  She states that she went to the ER on 4/15 with c/o chest pain.  She reports that her BP fluctuated while in the ER.   Her hgb was in the 9s.  She reports that she started her menstrual cycle yesterday.  Troponin was negative.  Kidney function was normal.   She states that once she got home she started having some confusion.  She has trouble concentrating.  Her  states that yesterday morning around 2am she started having CP and that's when she went to the ER.  They got home around 5 am yesterday.  She has been having trouble forming words.  She is having trouble verbally expressing her thoughts.  Pt states that she has left sided weakness.   Last time she felt normal was 2am yesterday       The following portions of the patient's history were reviewed and updated as appropriate: allergies, current medications, past family history, past medical history, past social history, past surgical history and problem list.    Review of Systems   Constitutional: Positive for fatigue. Negative for chills and fever.   Eyes: Negative for blurred vision and double vision.   Respiratory: Negative for chest tightness and shortness of breath.    Cardiovascular: Negative for chest pain and palpitations.   Neurological: Positive for speech difficulty, weakness and confusion.       Objective   /74 (BP Location: Left arm, Patient Position: Sitting, Cuff Size: Adult)   Pulse 65   Temp 97.3 °F (36.3 °C) (Tympanic)   Wt 78.5 kg (173 lb)   SpO2 100%   BMI 29.70 kg/m²   Physical Exam  Constitutional:       Appearance: She is ill-appearing.   HENT:      Head: Normocephalic and atraumatic.   Cardiovascular:      Rate and Rhythm: Normal rate and regular rhythm.      Heart sounds: No murmur heard.     Pulmonary:      Effort: Pulmonary effort is normal. No respiratory distress.      Breath sounds: Normal  breath sounds.   Neurological:      Mental Status: She is alert and oriented to person, place, and time.      Motor: Weakness (mild left sided weakness.) present.      Comments: Reports feeling like left side of face is heavy when blowing cheeks out and smiling   Psychiatric:         Mood and Affect: Mood normal.         Behavior: Behavior normal.         Thought Content: Thought content normal.         Judgment: Judgment normal.           Assessment/Plan     Diagnoses and all orders for this visit:    1. Confusion (Primary)  Comments:  new issue  will refer to ER for further eval to r/o stroke  feels left sided weakness  report given to ER-  taking her directly there  Orders:  -     Vitamin B12; Future  -     Iron and TIBC; Future  -     Hemoglobin A1c; Future  -     TSH; Future    2. Fluctuating blood pressure  Comments:  follow back up with cardiology  referral back to ER  Orders:  -     Ambulatory Referral to Cardiology

## 2021-04-16 NOTE — DISCHARGE INSTRUCTIONS
No driving swimming or use of hazardous machinery until cleared by follow-up physician  Medication as directed

## 2021-04-16 NOTE — ED NOTES
Pt refuses to remove ear/nose/tongue piercings for MRI due to them being new. MRI notified. MD notified. MRI states that they can continue with MRI, but results might not be diagnostic.      Susan Carolina RN  04/16/21 1518

## 2021-04-16 NOTE — ED NOTES
Pt states she came to the ER last night and came back today because her symptoms of confusion had gotten worse.     Susan Carolina, MAXIMO  04/16/21 0115

## 2021-04-16 NOTE — ED PROVIDER NOTES
Subjective   36-year-old female seen earlier for chest pain went home and went to bed.  She states she woke up and had a headache with some left-sided weakness.  She states she felt confused and slow to respond.  The patient was noted to be alert and oriented with synthetic speech at triage.  The patient has had no reports of head trauma or syncope.  She denies chest pain shortness of breath diaphoresis or palpitations.  She denies abdominal pain nausea vomiting diaphoresis or palpitations.  She reports no focal neurologic defects or lateralizing neurologic signs.  She denies neck pain or stiffness.  Her history taking process was straightforward without any overt evidence of confusion, although the patient would occasionally pause before answering questions          Review of Systems   Constitutional: Positive for fatigue. Negative for chills, diaphoresis and fever.   HENT: Negative for nosebleeds, postnasal drip and sore throat.    Respiratory: Positive for chest tightness and shortness of breath.    Cardiovascular: Negative for chest pain, palpitations and leg swelling.   Gastrointestinal: Positive for nausea. Negative for diarrhea and vomiting.   Musculoskeletal: Positive for myalgias. Negative for neck pain and neck stiffness.   Neurological: Positive for dizziness and headaches. Negative for tremors, syncope, weakness and numbness.   Hematological: Does not bruise/bleed easily.   All other systems reviewed and are negative.      Past Medical History:   Diagnosis Date   • Allergic    • Anemia    • Anxiety    • Arthritis    • Asthma    • Cancer (CMS/HCC)     cervical 2008   • Chronic constipation    • Chronic pain disorder    • Depression    • Disease of thyroid gland    • Extremity pain    • Fibromyalgia, primary    • Headache    • Hx of migraines    • Injury of back    • Joint pain    • Low back pain    • Neck pain    • Peripheral neuropathy    • PTSD (post-traumatic stress disorder)    • Shortness of breath     • Stroke (CMS/[facility])     Mini heat stroke   • Tachycardia        Allergies   Allergen Reactions   • Bleach [Sodium Hypochlorite] Anaphylaxis   • Adhesive Tape Hives   • Latex Hives and Unknown - High Severity       Past Surgical History:   Procedure Laterality Date   • CARDIAC ABLATION  2020   • CARDIAC CATHETERIZATION N/A 2019    Procedure: Coronary angiography;  Surgeon: Leo Fagan MD;  Location: [facility] CATH INVASIVE LOCATION;  Service: Cardiovascular   • CARDIAC CATHETERIZATION Left 2019    Procedure: Cardiac Catheterization/Vascular Study;  Surgeon: Leo Fagan MD;  Location: [facility] CATH INVASIVE LOCATION;  Service: Cardiovascular   • CARDIAC CATHETERIZATION N/A 2019    Procedure: Left ventriculography;  Surgeon: Leo Fagan MD;  Location: [facility] CATH INVASIVE LOCATION;  Service: Cardiovascular   • CARDIAC ELECTROPHYSIOLOGY PROCEDURE N/A 2020    Procedure: EP/Ablation;  Surgeon: Luther Walden MD;  Location: [facility] CATH INVASIVE LOCATION;  Service: Cardiovascular;  Laterality: N/A;   •  SECTION      x2   • CHOLECYSTECTOMY     • ENDOSCOPY     • GASTRIC SLEEVE LAPAROSCOPIC     • HAND SURGERY     • PERONEAL TENDON EXPLORATION Right 2020    Procedure: EXTENSOR HALLICUS LONGUS REPAIR AND NERVE DECOMPRESSION;  Surgeon: VALERIA Rajan DPM;  Location: [facility] MAIN OR;  Service: Podiatry;  Laterality: Right;   • TUBAL ABDOMINAL LIGATION         Family History   Problem Relation Age of Onset   • No Known Problems Mother    • Cancer Father    • Coronary artery disease Father    • Diabetes Father    • Stroke Father    • Heart attack Father    • Mental illness Brother    • Mental illness Son        Social History     Socioeconomic History   • Marital status:      Spouse name: Not on file   • Number of children: Not on file   • Years of education: Not on file   • Highest education level: Not on file   Tobacco Use   • Smoking status: Never Smoker   • Smokeless  tobacco: Never Used   • Tobacco comment: Pt states she has never smoked   Vaping Use   • Vaping Use: Never used   Substance and Sexual Activity   • Alcohol use: No   • Drug use: No   • Sexual activity: Defer       No reported safety issues  Objective   Physical Exam  Alert [place] Coma Scale 15 3/3 objects at 3 minutes.  This patient's speech was fluent and synthetic.  She reported that she had been told once in the past she might have migraines but never since had this as a permanent diagnosis.  She reports no recent changes in medication.  She denies being upset or stressed and denies homicidal or suicidal ideations.  She denied cognitive and vegetative symptoms of depression   HEENT: Pupils equal and reactive to light. Conjunctivae are not injected. normal tympanic membranes. Oropharynx and nares are normal.   Neck: Supple. Midline trachea. No JVD. No goiter.   Chest: Clear and equal breath sounds bilaterally regular rate and rhythm without murmur or rub.   Abdomen: Positive bowel sounds nontender nondistended. No rebound or peritoneal signs. No CVA tenderness.   Extremities/neuro: Right-hand-dominant.  Cranial nerves are grossly intact motor sensory exam is normal no eye reflexes elicited normal gait normal proprioception testing and no pronator drift was identified no clubbing cyanosis or edema motor sensory exam is normal the full range of motion is intact   skin: Warm and dry, no rashes or petechia.   Lymphatic: No regional lymphadenopathy. No calf pain, swelling or Sarah's sign    Procedures           ED Course           Labs Reviewed   SEDIMENTATION RATE - Abnormal; Notable for the following components:       Result Value    Sed Rate 26 (*)     All other components within normal limits   URINE DRUG SCREEN - Abnormal; Notable for the following components:    Opiate Screen Positive (*)     All other components within normal limits    Narrative:     Negative Thresholds Per Drugs Screened:    Amphetamines                  500 ng/ml  Barbiturates                 200 ng/ml  Benzodiazepines              100 ng/ml  Cocaine                      300 ng/ml  Methadone                    300 ng/ml  Opiates                      300 ng/ml  Oxycodone                    100 ng/ml  THC                           50 ng/ml    The Normal Value for all drugs tested is negative. This report includes final unconfirmed screening results to be used for medical treatment purposes only. Unconfirmed results must not be used for non-medical purposes such as employment or legal testing. Clinical consideration should be applied to any drug of abuse test, particularly when unconfirmed results are used.          All urine drugs of abuse requests without chain of custody are for medical screening purposes only.  False positives are possible.     AMMONIA - Abnormal; Notable for the following components:    Ammonia 10 (*)     All other components within normal limits   CBC WITH AUTO DIFFERENTIAL - Abnormal; Notable for the following components:    RBC 3.51 (*)     Hemoglobin 9.1 (*)     Hematocrit 27.8 (*)     MCH 26.0 (*)     RDW 16.4 (*)     All other components within normal limits   PREGNANCY, URINE - Normal   URINALYSIS W/ CULTURE IF INDICATED - Normal    Narrative:     Urine microscopic not indicated.   POCT GLUCOSE FINGERSTICK - Normal   COMPREHENSIVE METABOLIC PANEL    Narrative:     GFR Normal >60  Chronic Kidney Disease <60  Kidney Failure <15     CBC AND DIFFERENTIAL    Narrative:     The following orders were created for panel order CBC & Differential.  Procedure                               Abnormality         Status                     ---------                               -----------         ------                     CBC Auto Differential[[id number]]        Abnormal            Final result                 Please view results for these tests on the individual orders.     Medications   diphenhydrAMINE (BENADRYL) injection 25 mg (25 mg  Intravenous Given 4/16/21 1343)   prochlorperazine (COMPAZINE) injection 10 mg (10 mg Intravenous Given 4/16/21 1343)   sodium chloride 0.9 % bolus 500 mL (0 mL Intravenous Stopped 4/16/21 1506)   Morphine (MS CONTIN) 12 hr tablet 30 mg (30 mg Oral Given 4/16/21 1433)     MRI Brain Without Contrast    Result Date: 4/16/2021  1. Normal MR of the brain.  Electronically Signed By-Gwendolyn Bolaños MD On:4/16/2021 4:14 PM This report was finalized on [id number] by  Gwendolyn Bolaños MD.    XR Chest 1 View    Result Date: 4/15/2021  No acute cardiopulmonary abnormality.  Electronically Signed By-Chaz Anna MD On:4/15/2021 7:08 AM This report was finalized on [id number] by  Chaz Anna MD.                                    MDM  Number of Diagnoses or Management Options     Amount and/or Complexity of Data Reviewed  Clinical lab tests: reviewed  Tests in the radiology section of CPT®: reviewed    Risk of Complications, Morbidity, and/or Mortality  Presenting problems: high  Diagnostic procedures: high  Management options: high  General comments: There was an extended delay getting the patient's MRI.  The patient was noted to have no observable confusion throughout her ER stay.  The patient appears to have a variant migraine symptomatology and was given a prescription for Compazine she is encouraged to follow-up with a neurologist the patient was stable alert and fully functional at discharge with complete orientation she vocalized understanding of discharge instructions and warnings        Final diagnoses:   Headache, variant migraine       ED Disposition  ED Disposition     ED Disposition Condition Comment    Discharge Stable           Michelle Narayanan MD  [facility] [address]  [place] [facility]  [phone]          Neurologist  [phone]             Medication List      New Prescriptions    prochlorperazine 10 MG tablet  Commonly known as: COMPAZINE  Take 1 tablet by mouth Every 6  (Six) Hours As Needed for Nausea or Vomiting (And variant migraine symptoms).           Where to Get Your Medications      These medications were sent to [facility], [place] - [address] - [phone] [facility] [phone]   [address] [place] [address]    Phone: [phone]   · prochlorperazine 10 MG tablet          Daniel Vidal MD  04/16/21 6952

## 2021-04-28 ENCOUNTER — OFFICE VISIT (OUTPATIENT)
Dept: CARDIOLOGY | Facility: CLINIC | Age: 36
End: 2021-04-28

## 2021-04-28 ENCOUNTER — TELEPHONE (OUTPATIENT)
Dept: FAMILY MEDICINE CLINIC | Facility: CLINIC | Age: 36
End: 2021-04-28

## 2021-04-28 VITALS
OXYGEN SATURATION: 99 % | HEART RATE: 73 BPM | SYSTOLIC BLOOD PRESSURE: 120 MMHG | BODY MASS INDEX: 30.3 KG/M2 | DIASTOLIC BLOOD PRESSURE: 70 MMHG | HEIGHT: 63 IN | WEIGHT: 171 LBS

## 2021-04-28 DIAGNOSIS — R51.9 NONINTRACTABLE HEADACHE, UNSPECIFIED CHRONICITY PATTERN, UNSPECIFIED HEADACHE TYPE: ICD-10-CM

## 2021-04-28 DIAGNOSIS — R00.2 PALPITATIONS: Primary | ICD-10-CM

## 2021-04-28 DIAGNOSIS — I47.29 VENTRICULAR TACHYCARDIA (PAROXYSMAL) (HCC): ICD-10-CM

## 2021-04-28 DIAGNOSIS — R41.0 CONFUSION: Primary | ICD-10-CM

## 2021-04-28 PROCEDURE — 99213 OFFICE O/P EST LOW 20 MIN: CPT | Performed by: INTERNAL MEDICINE

## 2021-04-28 NOTE — TELEPHONE ENCOUNTER
Pt states that the  thinks she was misdiagnosed. She is not having headaches just confusion and slurred speech.

## 2021-04-28 NOTE — TELEPHONE ENCOUNTER
I called and spoke with patient.  She is concerned that she could have POTS.  Dr. Fagan referred her to a physician in [place].  She is going to make sure they take her insurance.  I advised her that I still want her to follow-up with neurology.  She is still having slurred speech and confusion   English

## 2021-04-28 NOTE — PROGRESS NOTES
Date of Office Visit: 2021  Encounter Provider: Dr. Leo Fagan    Place of Service: [facility] CARDIOLOGY [place]  Patient Name: Alyson Lew  :1985  Michelle Narayanan MD    Chief Complaint   Patient presents with   • Hypertension     Blood pressure fluctuating      History of Present Illness    I am pleased to see Mrs. Lew in my office today as a follow-up.    As you know, patient is 36 years old white female whose past medical history is significant for hypothyroidism, asthma, chronic pain syndrome, fibromyalgia, on chronic morphine therapy, who came today for follow-up.    In 2019, patient was presented to our office for symptom of dizziness and lightheadedness and palpitation.  Patient underwent Holter monitor and it was noted to have nonsustained ventricular tachycardia.  Patient underwent echocardiogram which showed EF of 60 to 65%.  No significant valvular heart disease is noted.  Cardiac catheterization showed no significant coronary artery disease and LV function was preserved.  I referred the patient to electrophysiologist Dr. Walden.  Patient underwent EP study and no inducible arrhythmia was noted on EP study.  Medical treatment was recommended.    From last visit, patient had few episodes of elevated blood pressure in the range of 160.  She had headache and also slurring of speech.  She was evaluated in the emergency room.  MRI was done and it showed no stroke.  It was thought to be complicated migraine.    Patient came today.  She is very confrontational and argumentative during the interview.  Patient wants to evaluate for POTS disease.  I do not have expertise and equipment to proceed with such studies.  I suggested that she should seek second opinion.    Patient denies any syncopal episode.  No chest pain.  No orthopnea or PND.  Patient had chronic pain syndrome throughout the body.  Patient is on very high dose of morphine and Lyrica.  Patient is also on  trazodone.    At this stage, I would recommend that patient should have second opinion.  I advised him to go to [facility] and also consider [facility].  In the meanwhile patient is advised to have second opinion with Dr. Avendaño at [facility].  I would not see the patient in future.  Patient is advised to follow-up with PCP and Dr. Avendaño.  I would only see the patient as needed.          Past Medical History:   Diagnosis Date   • Allergic    • Anemia    • Anxiety    • Arthritis    • Asthma    • Cancer (CMS/HCC)     cervical 2008   • Chronic constipation    • Chronic pain disorder    • Depression    • Disease of thyroid gland    • Extremity pain    • Fibromyalgia, primary    • Headache    • Hx of migraines    • Injury of back    • Joint pain    • Low back pain    • Neck pain    • Peripheral neuropathy    • PTSD (post-traumatic stress disorder)    • Shortness of breath    • Stroke (CMS/HCC)     Mini heat stroke   • Tachycardia          Past Surgical History:   Procedure Laterality Date   • CARDIAC ABLATION  2020   • CARDIAC CATHETERIZATION N/A 2019    Procedure: Coronary angiography;  Surgeon: Leo Fagan MD;  Location: [facility] CATH INVASIVE LOCATION;  Service: Cardiovascular   • CARDIAC CATHETERIZATION Left 2019    Procedure: Cardiac Catheterization/Vascular Study;  Surgeon: Leo Fagan MD;  Location: [facility] CATH INVASIVE LOCATION;  Service: Cardiovascular   • CARDIAC CATHETERIZATION N/A 2019    Procedure: Left ventriculography;  Surgeon: Leo Fagan MD;  Location: [facility] CATH INVASIVE LOCATION;  Service: Cardiovascular   • CARDIAC ELECTROPHYSIOLOGY PROCEDURE N/A 2020    Procedure: EP/Ablation;  Surgeon: Luther Walden MD;  Location: [facility] CATH INVASIVE LOCATION;  Service: Cardiovascular;  Laterality: N/A;   •  SECTION      x2   • CHOLECYSTECTOMY     • ENDOSCOPY     • GASTRIC SLEEVE LAPAROSCOPIC     • HAND SURGERY     • PERONEAL TENDON  EXPLORATION Right 2/17/2020    Procedure: EXTENSOR HALLICUS LONGUS REPAIR AND NERVE DECOMPRESSION;  Surgeon: VALERIA Rajan DPM;  Location: [facility] MAIN OR;  Service: Podiatry;  Laterality: Right;   • TUBAL ABDOMINAL LIGATION             Current Outpatient Medications:   •  albuterol sulfate  (90 Base) MCG/ACT inhaler, INHALE TWO (2) PUFFS BY MOUTH EVERY 4 HOURS AS NEEDED, Disp: 18 g, Rfl: 1  •  diclofenac (FLECTOR) 1.3 % patch patch, Apply 1 patch topically to the appropriate area as directed 2 (Two) Times a Day As Needed (pain)., Disp: 60 patch, Rfl: 2  •  fluticasone (FLONASE) 50 MCG/ACT nasal spray, 2 sprays into the nostril(s) as directed by provider Daily., Disp: 1 bottle, Rfl: 2  •  lactulose (CHRONULAC) 10 GM/15ML solution, TAKE 15 MILLILITER (ML) BY MOUTH EVERY DAY, Disp: , Rfl:   •  levothyroxine (LEVO-T) 75 MCG tablet, Take 1 tablet by mouth Daily., Disp: 30 tablet, Rfl: 1  •  lubiprostone (Amitiza) 24 MCG capsule, Take 1 capsule by mouth 2 (Two) Times a Day With Meals., Disp: 60 capsule, Rfl: 2  •  Lyrica 200 MG capsule, Take 1 capsule by mouth 3 (Three) Times a Day., Disp: 90 capsule, Rfl: 2  •  Morphine (MS CONTIN) 30 MG 12 hr tablet, Take 1 tablet by mouth 2 (Two) Times a Day for 30 days., Disp: 60 tablet, Rfl: 0  •  Morphine (MS CONTIN) 30 MG 12 hr tablet, Take 1 tablet by mouth 2 (Two) Times a Day., Disp: 60 tablet, Rfl: 0  •  Morphine (MS CONTIN) 30 MG 12 hr tablet, Take 1 tablet by mouth 2 (Two) Times a Day., Disp: 60 tablet, Rfl: 0  •  naproxen (NAPROSYN) 500 MG tablet, TAKE ONE (1) TABLET BY MOUTH EVERY TWELVE HOURS WITH FOOD OR MILK AS NEEDED, Disp: , Rfl: 0  •  omeprazole (PrilOSEC) 40 MG capsule, Take 1 capsule by mouth Daily., Disp: 30 capsule, Rfl: 6  •  Pediatric Multiple Vit-C-FA (FLINSTONES GUMMIES OMEGA-3 DHA) chewable tablet, JOHANA GUMMIES OMEGA-3 DHA CHEW, Disp: , Rfl:   •  prochlorperazine (COMPAZINE) 10 MG tablet, Take 1 tablet by mouth Every 6 (Six) Hours As Needed  "for Nausea or Vomiting (And variant migraine symptoms)., Disp: 12 tablet, Rfl: 0  •  tiZANidine (ZANAFLEX) 4 MG tablet, Take 1 tablet by mouth Every 8 (Eight) Hours As Needed for Muscle Spasms., Disp: 90 tablet, Rfl: 11  •  traZODone (DESYREL) 150 MG tablet, Take 1 tablet by mouth Every Night., Disp: 30 tablet, Rfl: 0      Social History     Socioeconomic History   • Marital status:      Spouse name: Not on file   • Number of children: Not on file   • Years of education: Not on file   • Highest education level: Not on file   Tobacco Use   • Smoking status: Never Smoker   • Smokeless tobacco: Never Used   • Tobacco comment: Pt states she has never smoked   Vaping Use   • Vaping Use: Never used   Substance and Sexual Activity   • Alcohol use: No   • Drug use: No   • Sexual activity: Defer         Review of Systems   Constitutional: Negative for chills and fever.   HENT: Negative for ear discharge and nosebleeds.    Eyes: Negative for discharge and redness.   Cardiovascular: Negative for chest pain, orthopnea, palpitations, paroxysmal nocturnal dyspnea and syncope.   Respiratory: Negative for cough, shortness of breath and wheezing.    Endocrine: Negative for heat intolerance.   Skin: Negative for rash.   Musculoskeletal: Positive for arthritis and joint pain. Negative for myalgias.   Gastrointestinal: Negative for abdominal pain, melena, nausea and vomiting.   Genitourinary: Negative for dysuria and hematuria.   Neurological: Positive for headaches. Negative for dizziness, light-headedness, numbness and tremors.   Psychiatric/Behavioral: Negative for depression. The patient is nervous/anxious.        Procedures    Procedures    No orders to display           Objective:    /70   Pulse 73   Ht 160 cm (62.99\")   Wt 77.6 kg (171 lb)   SpO2 99%   BMI 30.30 kg/m²         Constitutional:       Appearance: Well-developed.   Eyes:      General: No scleral icterus.        Right eye: No discharge.   HENT:      " Head: Normocephalic and atraumatic.   Neck:      Thyroid: No thyromegaly.      Lymphadenopathy: No cervical adenopathy.   Pulmonary:      Effort: Pulmonary effort is normal. No respiratory distress.      Breath sounds: Normal breath sounds. No wheezing. No rales.   Cardiovascular:      Normal rate. Regular rhythm.      No gallop.   Abdominal:      Tenderness: There is no abdominal tenderness.   Skin:     Findings: No erythema or rash.   Neurological:      Mental Status: Alert and oriented to person, place, and time.             Assessment:       Diagnosis Plan   1. Palpitations     2. Ventricular tachycardia (paroxysmal) (CMS/[facility])              Plan:       MDM:    1.  Ventricular tachycardia:    Patient underwent EP study and there was no induction of arrhythmia.  Dr. Celeste recommend observation.  Patient has not had any further episode.    2.  Palpitation:    Patient has not had any further episode    3.  Fluctuating blood pressure:    During all my visits in the office today her blood pressure has been stable.  She reports that she has elevated blood pressure at home and it was associated with headaches and she was evaluated by emergency room.  She thinks that she has pots disease.  I would recommend that patient should have second opinion.  I have given different options to go.  Patient is advised to go to [facility] or [facility].  In the meanwhile I advised the patient to see Dr. Avendaño at [facility].  I discussed in detail with the patient as well as her .  All information about contacts and making appointment with Dr. Avendaño are given to the patient.  I will see the patient as needed until patient sees a second opinion.

## 2021-04-28 NOTE — TELEPHONE ENCOUNTER
Pt and pt  request call back regarding referral appt to Dr Fagan.   States that Dr Fagan told them pt problem was out of his realm  And that he could do nothing for her.   Asks for call back to discuss what the next step will be.

## 2021-04-28 NOTE — TELEPHONE ENCOUNTER
It looks as if the ER recommended seeing a neurologist at her last visit. I will place a referral but that is the next step for the headaches and confusion

## 2021-07-28 ENCOUNTER — TELEPHONE (OUTPATIENT)
Dept: PAIN MEDICINE | Facility: CLINIC | Age: 36
End: 2021-07-28

## 2021-07-28 DIAGNOSIS — M54.50 CHRONIC MIDLINE LOW BACK PAIN, UNSPECIFIED WHETHER SCIATICA PRESENT: ICD-10-CM

## 2021-07-28 DIAGNOSIS — G89.29 CHRONIC MIDLINE LOW BACK PAIN, UNSPECIFIED WHETHER SCIATICA PRESENT: ICD-10-CM

## 2021-07-28 RX ORDER — MORPHINE SULFATE 30 MG/1
30 TABLET, FILM COATED, EXTENDED RELEASE ORAL 2 TIMES DAILY
Qty: 14 TABLET | Refills: 0 | Status: SHIPPED | OUTPATIENT
Start: 2021-07-28 | End: 2021-08-04

## 2021-07-28 NOTE — TELEPHONE ENCOUNTER
Sent morphine 30 mg ER twice daily as needed for 7 days for 14 tablets to the requested pharmacy.    Inspect and UDS reviewed and consistent with patient's interview in the past.      Navin Jerry DO  Pain Management   [facility]

## 2021-08-06 ENCOUNTER — TELEPHONE (OUTPATIENT)
Dept: PAIN MEDICINE | Facility: CLINIC | Age: 36
End: 2021-08-06

## 2021-08-06 NOTE — TELEPHONE ENCOUNTER
Patient notified and voiced understanding. Patient said that she would stretch what she has left out until she comes in on Tuesday.

## 2021-08-06 NOTE — TELEPHONE ENCOUNTER
Patient had a telephone visit last time and was told someone will call her to make appt. Patient states she had a stroke because of heart issues. And she cant remember much anymore. She will be out of meds next week and needs a refill she is requesting a telephone visit for next week. Her  is in hospital and she is not able to drive yet.  Pls send to Izzy to schedule.

## 2021-08-06 NOTE — TELEPHONE ENCOUNTER
Caller: BARBARA DE LEON    Relationship to patient: SELF    Best call back number:    Type of visit:  FOLLOW UP TELEPHONE    Requested date: *ASAP W DR BRO      Additional notes:PATIENT REQ CALL BACK TODAY //  UNABLE TO WARM TRANSFER - ** PATIENT ONLY HAS 3 DAY SUPPLY LEFT OF MORHPHINE AND WANTS TO SCHEDULE W DR CLASBY ASAP - THIS COMING WEEK.

## 2021-08-06 NOTE — TELEPHONE ENCOUNTER
Called and spoke with patient, patient relates that no one ever called her to schedule her next appointment. Patient states that since she had her mini stroke, her memory hasn't been the best so she never called to schedule with us either. I scheduled patient an appointment for 8/10, and informed patient that I would check with Dr. Rivero to see if he could send in a few days worth of her meds to last her until she comes in on Tuesday.

## 2021-08-10 ENCOUNTER — OFFICE VISIT (OUTPATIENT)
Dept: PAIN MEDICINE | Facility: CLINIC | Age: 36
End: 2021-08-10

## 2021-08-10 VITALS
DIASTOLIC BLOOD PRESSURE: 71 MMHG | OXYGEN SATURATION: 98 % | WEIGHT: 171 LBS | BODY MASS INDEX: 30.3 KG/M2 | SYSTOLIC BLOOD PRESSURE: 105 MMHG | RESPIRATION RATE: 16 BRPM | HEIGHT: 63 IN | HEART RATE: 97 BPM

## 2021-08-10 DIAGNOSIS — G89.29 CHRONIC RIGHT SHOULDER PAIN: ICD-10-CM

## 2021-08-10 DIAGNOSIS — G89.29 CHRONIC MIDLINE LOW BACK PAIN, UNSPECIFIED WHETHER SCIATICA PRESENT: Primary | ICD-10-CM

## 2021-08-10 DIAGNOSIS — G89.29 NECK PAIN, CHRONIC: ICD-10-CM

## 2021-08-10 DIAGNOSIS — M54.16 LUMBAR RADICULOPATHY: ICD-10-CM

## 2021-08-10 DIAGNOSIS — M79.601 PAIN IN RIGHT ARM: ICD-10-CM

## 2021-08-10 DIAGNOSIS — M54.50 CHRONIC MIDLINE LOW BACK PAIN, UNSPECIFIED WHETHER SCIATICA PRESENT: Primary | ICD-10-CM

## 2021-08-10 DIAGNOSIS — K59.03 DRUG-INDUCED CONSTIPATION: ICD-10-CM

## 2021-08-10 DIAGNOSIS — M25.511 CHRONIC RIGHT SHOULDER PAIN: ICD-10-CM

## 2021-08-10 DIAGNOSIS — M54.2 NECK PAIN, CHRONIC: ICD-10-CM

## 2021-08-10 DIAGNOSIS — M79.7 FIBROMYALGIA: ICD-10-CM

## 2021-08-10 DIAGNOSIS — M46.1 SACROILIITIS, NOT ELSEWHERE CLASSIFIED (HCC): ICD-10-CM

## 2021-08-10 DIAGNOSIS — M25.50 PAIN IN JOINT INVOLVING MULTIPLE SITES: ICD-10-CM

## 2021-08-10 DIAGNOSIS — Z79.899 HIGH RISK MEDICATION USE: Primary | ICD-10-CM

## 2021-08-10 DIAGNOSIS — Z79.899 OTHER LONG TERM (CURRENT) DRUG THERAPY: ICD-10-CM

## 2021-08-10 PROCEDURE — 99213 OFFICE O/P EST LOW 20 MIN: CPT | Performed by: PHYSICAL MEDICINE & REHABILITATION

## 2021-08-10 RX ORDER — MORPHINE SULFATE 30 MG/1
30 TABLET, FILM COATED, EXTENDED RELEASE ORAL 2 TIMES DAILY
Qty: 60 TABLET | Refills: 0 | Status: SHIPPED | OUTPATIENT
Start: 2021-08-10 | End: 2021-11-04 | Stop reason: SDUPTHER

## 2021-08-10 RX ORDER — DICLOFENAC EPOLAMINE 0.01 G/1
1 SYSTEM TOPICAL 2 TIMES DAILY
Qty: 60 PATCH | Refills: 11 | Status: SHIPPED | OUTPATIENT
Start: 2021-08-10

## 2021-08-10 RX ORDER — MORPHINE SULFATE 30 MG/1
30 TABLET, FILM COATED, EXTENDED RELEASE ORAL 2 TIMES DAILY
COMMUNITY
End: 2021-08-10 | Stop reason: SDUPTHER

## 2021-08-10 RX ORDER — TIZANIDINE 4 MG/1
4 TABLET ORAL EVERY 8 HOURS PRN
Qty: 90 TABLET | Refills: 11 | Status: SHIPPED | OUTPATIENT
Start: 2021-08-10 | End: 2022-04-26 | Stop reason: SDUPTHER

## 2021-08-10 NOTE — PROGRESS NOTES
"Subjective   Alyson Lew is a 36 y.o. female.     all over pain, especially back, neck and bilateral hip pain, right knee. Dz with fibromyalgia and inflammatory arthritis by Rheum, on DMARDs and Lyrica 100mg BID at initial visit with poor control, pain worsening, 10/10 at worst, 7/10 at best, always present, prevents work and housework, burning, sharp, varies. Prior notes reviewed, referred for worsening pain, does not have time for PT. Increased to Lyrica 100mg BID then TID, started Celebrex, Cymbalta 30mg then 60mg qHS, fewer flares but still severe fibromyalgia pain, also LBP and b/l hip pain. Started Norco 5/325mg, mostly taking qHS, some days BID with worsening pain with cold weather, becoming less effective, rotated to Percocet 5/325mg. C/o insomnia. Went to ED with severe pain not controlled with Percocet 5/325mg BID prn, now QID prn. Had gastric sleeve surgery, liquid Norco worked well, restarted Percocet with severe N/V, vomited up complete prescription. Started liquid Norco, working better, but pain worsening, having difficulty with ADLs, increased Norco and Lyrica, then MS-IR 15mg QID prn with pill . Worsening L \"hip\" pain, insomnia, constipation. Requesting NSAID. Had toni. Has FH of autoimmune disorders and MS, hasn't heard from Rheum. Needs PT before having injection, worsening SIJ pain even with PT, had b/l SIJ injections, helped briefly only.       The following portions of the patient's history were reviewed and updated as appropriate: allergies, current medications, past family history, past medical history, past social history, past surgical history and problem list.    Review of Systems   Constitutional: Negative for chills, fatigue and fever.   HENT: Negative for hearing loss and trouble swallowing.    Eyes: Positive for visual disturbance.   Respiratory: Positive for shortness of breath.    Cardiovascular: Negative for chest pain.   Gastrointestinal: Positive for constipation. " Negative for abdominal pain, diarrhea, nausea and vomiting.   Genitourinary: Negative for urinary incontinence.   Musculoskeletal: Positive for arthralgias, back pain and neck pain. Negative for joint swelling and myalgias.   Neurological: Positive for headache. Negative for dizziness, weakness and numbness.       Objective   Physical Exam   Constitutional: She is oriented to person, place, and time. She appears well-developed and well-nourished.   HENT:   Head: Normocephalic and atraumatic.   Eyes: Pupils are equal, round, and reactive to light.   Cardiovascular: Normal rate, regular rhythm and normal heart sounds.   Pulmonary/Chest: Breath sounds normal.   Abdominal: Soft. Bowel sounds are normal. She exhibits no distension. There is no abdominal tenderness.   Musculoskeletal: Tenderness present.      Comments: positive gianni finger test, TTP over b/l SIJ, b/l CARINA, and Gaenslens   Neurological: She is alert and oriented to person, place, and time. She has normal reflexes. She displays normal reflexes. No sensory deficit.   Psychiatric: Her behavior is normal. Thought content normal.         Assessment/Plan   Diagnoses and all orders for this visit:    1. Chronic midline low back pain, unspecified whether sciatica present (Primary)    2. Neck pain, chronic    3. Drug-induced constipation    4. Fibromyalgia    5. Lumbar radiculopathy    6. Other long term (current) drug therapy    7. Pain in joint involving multiple sites    8. Pain in right arm    9. Sacroiliitis, not elsewhere classified (CMS/HCC)    10. Chronic right shoulder pain        INspect reviewed, in order. Low risk. Repeat UDS 7/23/19 in order.  Increased to Lyrica 200mg TID, sedating, numbs her lips, but benefit outweighs side effects.  Cont Cymbalta 60mg qHS, helping, and sleeping better.  Stopped Norco 5/325mg, failed Percocet 5/325mg, started liquid Norco 7.5/325mg/15ml 15ml QID prn, #1800. Increased to q4h prn, #2700, no longer working.  Increased to MS-IR 15mg q6h prn, now MS-Contin 30mg BID, helping a lot more.  Failed Mobic 7.5mg qd - BID prn, failed Celebrex.  Cont Silenor.  Cont Relistor 450mg qdaily, failed Miralax, colace.  Began RxAlt #2 compounded cream.  Ordered Flector BID prn.  New referral to Rheum.  Referral to PT for LBP and L hip pain.  Performed b/l SIJ injection.  RTC in 3 months for f/u.

## 2021-08-11 ENCOUNTER — TELEPHONE (OUTPATIENT)
Dept: PAIN MEDICINE | Facility: CLINIC | Age: 36
End: 2021-08-11

## 2021-11-04 ENCOUNTER — OFFICE VISIT (OUTPATIENT)
Dept: PAIN MEDICINE | Facility: CLINIC | Age: 36
End: 2021-11-04

## 2021-11-04 VITALS
RESPIRATION RATE: 16 BRPM | WEIGHT: 171 LBS | SYSTOLIC BLOOD PRESSURE: 102 MMHG | HEIGHT: 63 IN | BODY MASS INDEX: 30.3 KG/M2 | DIASTOLIC BLOOD PRESSURE: 60 MMHG | HEART RATE: 72 BPM | OXYGEN SATURATION: 96 %

## 2021-11-04 DIAGNOSIS — R47.01 APHASIA: ICD-10-CM

## 2021-11-04 DIAGNOSIS — G89.29 NECK PAIN, CHRONIC: ICD-10-CM

## 2021-11-04 DIAGNOSIS — G89.29 CHRONIC RIGHT SHOULDER PAIN: ICD-10-CM

## 2021-11-04 DIAGNOSIS — M54.2 NECK PAIN, CHRONIC: ICD-10-CM

## 2021-11-04 DIAGNOSIS — I47.29 VENTRICULAR TACHYCARDIA (PAROXYSMAL) (HCC): ICD-10-CM

## 2021-11-04 DIAGNOSIS — M25.511 CHRONIC RIGHT SHOULDER PAIN: ICD-10-CM

## 2021-11-04 DIAGNOSIS — G89.29 CHRONIC MIDLINE LOW BACK PAIN, UNSPECIFIED WHETHER SCIATICA PRESENT: Primary | ICD-10-CM

## 2021-11-04 DIAGNOSIS — M54.16 LUMBAR RADICULOPATHY: ICD-10-CM

## 2021-11-04 DIAGNOSIS — M25.50 PAIN IN JOINT INVOLVING MULTIPLE SITES: ICD-10-CM

## 2021-11-04 DIAGNOSIS — M46.1 SACROILIITIS, NOT ELSEWHERE CLASSIFIED (HCC): ICD-10-CM

## 2021-11-04 DIAGNOSIS — M79.7 FIBROMYALGIA: ICD-10-CM

## 2021-11-04 DIAGNOSIS — Z79.899 OTHER LONG TERM (CURRENT) DRUG THERAPY: ICD-10-CM

## 2021-11-04 DIAGNOSIS — K59.03 DRUG-INDUCED CONSTIPATION: ICD-10-CM

## 2021-11-04 DIAGNOSIS — M54.50 CHRONIC MIDLINE LOW BACK PAIN, UNSPECIFIED WHETHER SCIATICA PRESENT: Primary | ICD-10-CM

## 2021-11-04 PROBLEM — R00.0 TACHYCARDIA: Status: ACTIVE | Noted: 2021-11-04

## 2021-11-04 PROCEDURE — 99214 OFFICE O/P EST MOD 30 MIN: CPT | Performed by: PHYSICAL MEDICINE & REHABILITATION

## 2021-11-04 RX ORDER — MORPHINE SULFATE 30 MG/1
30 TABLET, FILM COATED, EXTENDED RELEASE ORAL 2 TIMES DAILY
Qty: 60 TABLET | Refills: 0 | Status: SHIPPED | OUTPATIENT
Start: 2021-11-04 | End: 2021-11-29

## 2021-11-04 RX ORDER — MORPHINE SULFATE 30 MG/1
30 TABLET, FILM COATED, EXTENDED RELEASE ORAL 2 TIMES DAILY
Qty: 60 TABLET | Refills: 0 | Status: SHIPPED | OUTPATIENT
Start: 2021-11-04 | End: 2022-02-01 | Stop reason: SDUPTHER

## 2021-11-04 NOTE — PROGRESS NOTES
"Subjective   Alyson Lew is a 36 y.o. female.     all over pain, especially back, neck and bilateral hip pain, right knee. Dz with fibromyalgia and inflammatory arthritis by Rheum, on DMARDs and Lyrica 100mg BID at initial visit with poor control, pain worsening, 10/10 at worst, 7/10 at best, always present, prevents work and housework, burning, sharp, varies. Prior notes reviewed, referred for worsening pain, does not have time for PT. Increased to Lyrica 100mg BID then TID, started Celebrex, Cymbalta 30mg then 60mg qHS, fewer flares but still severe fibromyalgia pain, also LBP and b/l hip pain. Started Norco 5/325mg, mostly taking qHS, some days BID with worsening pain with cold weather, becoming less effective, rotated to Percocet 5/325mg. C/o insomnia. Went to ED with severe pain not controlled with Percocet 5/325mg BID prn, now QID prn. Had gastric sleeve surgery, liquid Norco worked well, restarted Percocet with severe N/V, vomited up complete prescription. Started liquid Norco, working better, but pain worsening, having difficulty with ADLs, increased Norco and Lyrica, then MS-IR 15mg QID prn with pill . Worsening L \"hip\" pain, insomnia, constipation. Requesting NSAID. Had toni. Has FH of autoimmune disorders and MS, hasn't heard from Rheum. Needs PT before having injection, worsening SIJ pain even with PT, had b/l SIJ injections, helped, would like to repeat.    Also reports \"episodes\" where she loses the ability to speak c/w Broca's aphasia, resolves over 3-4 days spontaneously, had CT-brain in ED which was negative. Has h/o tachycardia, failed ablation, is not currently medicated.        The following portions of the patient's history were reviewed and updated as appropriate: allergies, current medications, past family history, past medical history, past social history, past surgical history and problem list.    Review of Systems   Constitutional: Negative for chills, fatigue and fever.   HENT: " Negative for hearing loss and trouble swallowing.    Eyes: Positive for visual disturbance.   Respiratory: Positive for shortness of breath.    Cardiovascular: Negative for chest pain.   Gastrointestinal: Positive for constipation. Negative for abdominal pain, diarrhea, nausea and vomiting.   Genitourinary: Negative for urinary incontinence.   Musculoskeletal: Positive for arthralgias, back pain and neck pain. Negative for joint swelling and myalgias.   Neurological: Positive for headache. Negative for dizziness, weakness and numbness.       Objective   Physical Exam   Constitutional: She is oriented to person, place, and time. She appears well-developed and well-nourished.   HENT:   Head: Normocephalic and atraumatic.   Eyes: Pupils are equal, round, and reactive to light.   Cardiovascular: Normal rate, regular rhythm, normal heart sounds and normal pulses.   No tachycardia or irregularity to pulses today   Pulmonary/Chest: Effort normal and breath sounds normal.   Abdominal: Soft. Bowel sounds are normal. She exhibits no distension. There is no abdominal tenderness.   Musculoskeletal: Tenderness present.      Comments: positive gianni finger test, TTP over b/l SIJ, b/l CARINA, and Gaenslens   Neurological: She is alert and oriented to person, place, and time. She has normal reflexes. She displays normal reflexes. No sensory deficit.   Psychiatric: Her behavior is normal. Thought content normal.         Assessment/Plan   Diagnoses and all orders for this visit:    1. Chronic midline low back pain, unspecified whether sciatica present (Primary)    2. Neck pain, chronic    3. Lumbar radiculopathy    4. Drug-induced constipation    5. Fibromyalgia    6. Other long term (current) drug therapy    7. Pain in joint involving multiple sites    8. Sacroiliitis, not elsewhere classified (HCC)    9. Chronic right shoulder pain        INspect reviewed, in order. Low risk. Repeat UDS 8/10/21 in order.  Increased to Lyrica 200mg  TID, sedating, numbs her lips, but benefit outweighs side effects.  Cont Cymbalta 60mg qHS, helping, and sleeping better.  Stopped Norco 5/325mg, failed Percocet 5/325mg, started liquid Norco 7.5/325mg/15ml 15ml QID prn, #1800. Increased to q4h prn, #2700, no longer working. Increased to MS-IR 15mg q6h prn, now MS-Contin 30mg BID, helping a lot more.  Failed Mobic 7.5mg qd - BID prn, failed Celebrex.  Cont Silenor.  Cont Relistor 450mg qdaily, failed Miralax, colace.  Began RxAlt #2 compounded cream.  Ordered Flector BID prn.  New referral to Rheum.  Referral to PT for LBP and L hip pain.  Performed b/l SIJ injection. Schedule repeat.  Referral to Dr. Seiple for possible h/o TIAs.  Referral to Cardiology for unmanaged (supraventricular?) tachycardia.  RTC for SIJ injections then in 3 months for f/u.

## 2021-11-10 ENCOUNTER — TELEPHONE (OUTPATIENT)
Dept: NEUROLOGY | Facility: CLINIC | Age: 36
End: 2021-11-10

## 2021-11-10 NOTE — TELEPHONE ENCOUNTER
Provider: LILLIAN  Caller: BARBARA  Relationship to Patient: SELF  Phone Number: [phone]  Reason for Call: PT HAS HAD 5 TIA ATTACKS SINCE MARCH AND HAS BEEN TRYING TO GET IN SOONER AND CARDIOLOGIST TOOK FOREVER TO GET HER IN. PT IS ASKING IF WE  CAN GET HER IN ANY TIME SOONER THAN FEB. PLEASE ADVISE.

## 2021-11-23 RX ORDER — NITROFURANTOIN 25; 75 MG/1; MG/1
100 CAPSULE ORAL 2 TIMES DAILY
Qty: 10 CAPSULE | Refills: 0 | Status: SHIPPED | OUTPATIENT
Start: 2021-11-23 | End: 2021-11-28

## 2021-11-23 NOTE — PROGRESS NOTES
Chief Complaint  Neurologic Problem (spells of confusion and difficulty talking. )    Subjective            Alyson Lew presents to [facility]UROLOG[facility] for Aphasia  History of Present Illness   Patient was referred by Dr. Rivero for aphasia.     Pt. has spells of confusion and difficulty talking. Disorientation. Weakness in arms, difficulty forming words.  Duration more than 24 hours, mostly back to normal in 24 hours. First spell April 2021.  Last spell 3 weeks ago, and one or two other times. All spells about the same. Had ha after the spells, severe ha, bilateral, start in front and then the entire head. HA started several hours into the spells of confusion. Ha not improved with Excedrin, goes away with sleeping.     Once or twice per month has ha relieved with excedrin  MRI of brain April 2021 was normal     Once while watching TV  Unable to move for about one min. Not aware she had fallen asleep., not related to the above spells     ====er visit April 2021 for spell of ha and confusion===     36-year-old female seen earlier for chest pain went home and went to bed.  She states she woke up and had a headache with some left-sided weakness.  She states she felt confused and slow to respond.  The patient was noted to be alert and oriented with synthetic speech at triage.  The patient has had no reports of head trauma or syncope.  She denies chest pain shortness of breath diaphoresis or palpitations.  She denies abdominal pain nausea vomiting diaphoresis or palpitations.  She reports no focal neurologic defects or lateralizing neurologic signs.  She denies neck pain or stiffness.  Her history taking process was straightforward without any overt evidence of confusion, although the patient would occasionally pause before answering questions      She reported that she had been told once in the past she might have migraines but never since had this as a permanent diagnosis      The patient was  "noted to have no observable confusion throughout her ER stay.  The patient appears to have a variant migraine symptomatology     ======previous visit dr cam 11/4/21======================  all over pain, especially back, neck and bilateral hip pain, right knee. Dz with fibromyalgia and inflammatory arthritis by Rheum, on DMARDs and Lyrica 100mg BID at initial visit with poor control, pain worsening, 10/10 at worst, 7/10 at best, always present, prevents work and housework, burning, sharp, varies. Prior notes reviewed, referred for worsening pain, does not have time for PT. Increased to Lyrica 100mg BID then TID, started Celebrex, Cymbalta 30mg then 60mg qHS, fewer flares but still severe fibromyalgia pain, also LBP and b/l hip pain. Started Norco 5/325mg, mostly taking qHS, some days BID with worsening pain with cold weather, becoming less effective, rotated to Percocet 5/325mg. C/o insomnia. Went to ED with severe pain not controlled with Percocet 5/325mg BID prn, now QID prn. Had gastric sleeve surgery, liquid Norco worked well, restarted Percocet with severe N/V, vomited up complete prescription. Started liquid Norco, working better, but pain worsening, having difficulty with ADLs, increased Norco and Lyrica, then MS-IR 15mg QID prn with pill . Worsening L \"hip\" pain, insomnia, constipation. Requesting NSAID. Had toni. Has FH of autoimmune disorders and MS, hasn't heard from Rheum. Needs PT before having injection, worsening SIJ pain even with PT, had b/l SIJ injections, helped, would like to repeat.     Also reports \"episodes\" where she loses the ability to speak c/w Broca's aphasia, resolves over 3-4 days spontaneously, had CT-brain in ED which was negative. Has h/o tachycardia, failed ablation, is not currently medicated.       INspect reviewed, in order. Low risk. Repeat UDS 8/10/21 in order.  Increased to Lyrica 200mg TID, sedating, numbs her lips, but benefit outweighs side effects.  Cont Cymbalta " 60mg qHS, helping, and sleeping better.  Stopped Norco 5/325mg, failed Percocet 5/325mg, started liquid Norco 7.5/325mg/15ml 15ml QID prn, #1800. Increased to q4h prn, #2700, no longer working. Increased to MS-IR 15mg q6h prn, now MS-Contin 30mg BID, helping a lot more.  Failed Mobic 7.5mg qd - BID prn, failed Celebrex.  Cont Silenor.  Cont Relistor 450mg qdaily, failed Miralax, colace.  Began RxAlt #2 compounded cream.  Ordered Flector BID prn.  New referral to Rheum.  Referral to PT for LBP and L hip pain.  Performed b/l SIJ injection. Schedule repeat.  Referral to Dr. Seiple for possible h/o TIAs.  Referral to Cardiology for unmanaged (supraventricular?) tachycardia.  RTC for SIJ injections then in 3 months for f/u.    DATE OF EXAM:   4/16/2021 3:40 PM   PROCEDURE:   MRI BRAINWO CONTRAST-   INDICATIONS:   confusion, left sided weakness   COMPARISON:  07/16/2016 CT head   IMPRESSION:  1. Normal MR of the brain.      Electronically Signed By-Gwendolyn Bolaños MD On:4/16/2021 4:14 PM  This report was finalized on [id number] by  Gwendolyn Bolaños MD.    Family History   Problem Relation Age of Onset   • No Known Problems Mother    • Cancer Father    • Coronary artery disease Father    • Diabetes Father    • Stroke Father    • Heart attack Father    • Heart disease Father    • Transient ischemic attack Father    • Migraines Father    • Mental illness Brother    • Mental illness Son    • Migraines Maternal Aunt    • Seizures Maternal Aunt    • Stroke Maternal Aunt    • Migraines Paternal Grandmother        Past Medical History:   Diagnosis Date   • Allergic    • Anemia    • Anxiety    • Arthritis    • Asthma    • Cancer (HCC)     cervical 2008   • Chronic constipation    • Chronic pain disorder    • Depression    • Disease of thyroid gland    • Extremity pain    • Fibromyalgia, primary    • Headache    • Hx of migraines    • Injury of back    • Joint pain    • Low back pain    • Neck pain    • Peripheral neuropathy    •  PTSD (post-traumatic stress disorder)    • Shortness of breath    • Stroke (HCC)     Mini heat stroke   • Tachycardia        Social History     Socioeconomic History   • Marital status:    Tobacco Use   • Smoking status: Never Smoker   • Smokeless tobacco: Never Used   • Tobacco comment: Pt states she has never smoked   Vaping Use   • Vaping Use: Every day   • Substances: Nicotine, Flavoring   • Devices: Pre-filled or refillable cartridge   Substance and Sexual Activity   • Alcohol use: No   • Drug use: No   • Sexual activity: Defer         Current Outpatient Medications:   •  albuterol sulfate  (90 Base) MCG/ACT inhaler, INHALE TWO (2) PUFFS BY MOUTH EVERY 4 HOURS AS NEEDED, Disp: 18 g, Rfl: 1  •  Diclofenac Epolamine (FLECTOR) 1.3 % patch patch, Apply 1 patch topically to the appropriate area as directed 2 (Two) Times a Day. As needed for back pain. Remove after 12 hours., Disp: 60 patch, Rfl: 11  •  levothyroxine (LEVO-T) 75 MCG tablet, Take 1 tablet by mouth Daily., Disp: 30 tablet, Rfl: 1  •  Morphine (MS CONTIN) 30 MG 12 hr tablet, Take 1 tablet by mouth 2 (Two) Times a Day., Disp: 60 tablet, Rfl: 0  •  naproxen (NAPROSYN) 500 MG tablet, TAKE ONE (1) TABLET BY MOUTH EVERY TWELVE HOURS WITH FOOD OR MILK AS NEEDED, Disp: , Rfl: 0  •  omeprazole (PrilOSEC) 40 MG capsule, Take 1 capsule by mouth Daily., Disp: 30 capsule, Rfl: 6  •  Pediatric Multiple Vit-C-FA (FLINSTONES GUMMIES OMEGA-3 DHA) chewable tablet, FLINSTONES GUMMIES OMEGA-3 DHA CHEW, Disp: , Rfl:   •  prochlorperazine (COMPAZINE) 10 MG tablet, Take 1 tablet by mouth Every 6 (Six) Hours As Needed for Nausea or Vomiting (And variant migraine symptoms)., Disp: 12 tablet, Rfl: 0  •  tiZANidine (ZANAFLEX) 4 MG tablet, Take 1 tablet by mouth Every 8 (Eight) Hours As Needed for Muscle Spasms., Disp: 90 tablet, Rfl: 11  •  divalproex (Depakote ER) 500 MG 24 hr tablet, Take 1 tablet by mouth Daily., Disp: 30 tablet, Rfl: 5  •  lubiprostone  "(Amitiza) 24 MCG capsule, Take 1 capsule by mouth 2 (Two) Times a Day With Meals., Disp: 60 capsule, Rfl: 2  •  Lyrica 200 MG capsule, Take 1 capsule by mouth 3 (Three) Times a Day., Disp: 90 capsule, Rfl: 2    Review of Systems   Constitutional: Positive for fatigue.   HENT: Negative for congestion.    Respiratory: Negative for chest tightness.    Musculoskeletal: Positive for back pain.   Neurological: Positive for speech difficulty and headaches.   Psychiatric/Behavioral: Negative for sleep disturbance.   All other systems reviewed and are negative.           Objective   Vital Signs:   BP 96/65   Pulse 117   Temp 97.1 °F (36.2 °C)   Resp 18   Ht 160 cm (63\")   Wt 72.6 kg (160 lb)   SpO2 99%   BMI 28.34 kg/m²     Physical Exam  Vitals reviewed.   Constitutional:       Appearance: Normal appearance.   HENT:      Head: Normocephalic.      Nose: Nose normal.   Eyes:      Extraocular Movements: Extraocular movements intact.      Pupils: Pupils are equal, round, and reactive to light.   Cardiovascular:      Rate and Rhythm: Normal rate.      Pulses: Normal pulses.   Pulmonary:      Effort: Pulmonary effort is normal. No respiratory distress.   Musculoskeletal:         General: Normal range of motion.      Cervical back: Normal range of motion.      Right lower leg: No edema.      Left lower leg: No edema.   Neurological:      General: No focal deficit present.      Mental Status: She is alert and oriented to person, place, and time.   Psychiatric:         Mood and Affect: Mood normal.         Behavior: Behavior normal.        Result Review :                Neurologic Exam     Mental Status   Oriented to person, place, and time.     Cranial Nerves     CN III, IV, VI   Pupils are equal, round, and reactive to light.             Assessment and Plan    Diagnoses and all orders for this visit:    1. Episodes of speech arrest (Primary)  -     MRI Brain With & Without Contrast; Future  -     EEG (Hospital Performed); " Future    Other orders  -     divalproex (Depakote ER) 500 MG 24 hr tablet; Take 1 tablet by mouth Daily.  Dispense: 30 tablet; Refill: 5        Follow Up   Return in about 3 months (around 2/28/2022).  Patient was given instructions and counseling regarding her condition or for health maintenance advice. Please see specific information pulled into the AVS if appropriate.         This document has been electronically signed by Joseph Seipel, MD on November 29, 2021 16:02 EST

## 2021-11-29 ENCOUNTER — OFFICE VISIT (OUTPATIENT)
Dept: NEUROLOGY | Facility: CLINIC | Age: 36
End: 2021-11-29

## 2021-11-29 VITALS
DIASTOLIC BLOOD PRESSURE: 65 MMHG | WEIGHT: 160 LBS | HEIGHT: 63 IN | TEMPERATURE: 97.1 F | BODY MASS INDEX: 28.35 KG/M2 | OXYGEN SATURATION: 99 % | HEART RATE: 117 BPM | RESPIRATION RATE: 18 BRPM | SYSTOLIC BLOOD PRESSURE: 96 MMHG

## 2021-11-29 DIAGNOSIS — R47.89 EPISODES OF SPEECH ARREST: Primary | ICD-10-CM

## 2021-11-29 DIAGNOSIS — G43.109 MIGRAINE EQUIVALENT SYNDROME: ICD-10-CM

## 2021-11-29 PROCEDURE — 99204 OFFICE O/P NEW MOD 45 MIN: CPT | Performed by: PSYCHIATRY & NEUROLOGY

## 2021-11-29 RX ORDER — SUMATRIPTAN 100 MG/1
100 TABLET, FILM COATED ORAL ONCE AS NEEDED
Qty: 9 TABLET | Refills: 3 | Status: SHIPPED | OUTPATIENT
Start: 2021-11-29 | End: 2021-12-23

## 2021-11-29 RX ORDER — DIVALPROEX SODIUM 500 MG/1
500 TABLET, EXTENDED RELEASE ORAL DAILY
Qty: 30 TABLET | Refills: 5 | Status: SHIPPED | OUTPATIENT
Start: 2021-11-29 | End: 2022-04-07 | Stop reason: SDUPTHER

## 2021-12-02 ENCOUNTER — APPOINTMENT (OUTPATIENT)
Dept: PAIN MEDICINE | Facility: HOSPITAL | Age: 36
End: 2021-12-02

## 2021-12-03 ENCOUNTER — TELEPHONE (OUTPATIENT)
Dept: PAIN MEDICINE | Facility: CLINIC | Age: 36
End: 2021-12-03

## 2021-12-03 NOTE — TELEPHONE ENCOUNTER
It's because she should have filled on 11/7, not 11/5, but that was a weekend. So she filled after 28 days last month, then after 32 days this month. 12/7 is the appropriate fill date. Thanks.

## 2021-12-03 NOTE — TELEPHONE ENCOUNTER
Spoke to pharmacist and the rx they have on file has a do not fill date for 12/7  Per inspect her last fill was 11/5  Pharmacy is closed Saturday and Sunday but they will fill it today if you can send a prescription with today's date.

## 2021-12-03 NOTE — TELEPHONE ENCOUNTER
Provider: ADALI  Caller: BARBARA  Phone Number: [phone]  Reason for Call: PT IS CALLING TO REPORT PROBLEMS WITH RX AND PHARMACY. LEFT  WAS ON 11/5/21 AND PT IS REPORTS THEY ARE REFUSING TO LET HER  NEXT TILL 12/07/21 LEAVING HER WITHOUT MEDS FOR 2 DAYS.  DUE TO THE WAY RX WAS WRITTEN, PLEASE REVIEW.    PT WANTS US TO UNDERSTAND PHARMACY WILL BE CLOSED SAT/SUN

## 2021-12-23 ENCOUNTER — OFFICE VISIT (OUTPATIENT)
Dept: CARDIOLOGY | Facility: CLINIC | Age: 36
End: 2021-12-23

## 2021-12-23 ENCOUNTER — TELEPHONE (OUTPATIENT)
Dept: CARDIOLOGY | Facility: CLINIC | Age: 36
End: 2021-12-23

## 2021-12-23 VITALS
OXYGEN SATURATION: 96 % | HEIGHT: 63 IN | WEIGHT: 161 LBS | SYSTOLIC BLOOD PRESSURE: 106 MMHG | HEART RATE: 68 BPM | DIASTOLIC BLOOD PRESSURE: 71 MMHG | BODY MASS INDEX: 28.53 KG/M2

## 2021-12-23 DIAGNOSIS — R55 NEAR SYNCOPE: ICD-10-CM

## 2021-12-23 DIAGNOSIS — R00.2 PALPITATIONS: Primary | ICD-10-CM

## 2021-12-23 DIAGNOSIS — I10 PRIMARY HYPERTENSION: ICD-10-CM

## 2021-12-23 DIAGNOSIS — R42 DIZZINESS: ICD-10-CM

## 2021-12-23 PROCEDURE — 99214 OFFICE O/P EST MOD 30 MIN: CPT | Performed by: INTERNAL MEDICINE

## 2021-12-23 PROCEDURE — 93000 ELECTROCARDIOGRAM COMPLETE: CPT | Performed by: INTERNAL MEDICINE

## 2021-12-23 RX ORDER — PROCHLORPERAZINE MALEATE 10 MG
10 TABLET ORAL EVERY 6 HOURS PRN
COMMUNITY
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
## 2021-12-23 NOTE — PROGRESS NOTES
CC--near syncope and wide QRS tachycardia    Sub--36-year-old female patient complains of sudden onset of palpitations and sudden offset of palpitations in the last few years and is being investigated by Dr. Fagan and was found to have wide QRS tachycardia during a Holter recording which prompted further cardiac evaluation and cardiac catheterization was within normal limits  She complains of ongoing symptoms for last 3 to 4 years can last for few minutes and sometimes as long as an hour  She does feel dizzy when that happens without any discomfort in her chest and denies any thomas syncope though she had spells of presyncope  She has several medical problems including prior history of obesity resolved with gastric sleeve and chronic fibromyalgia being managed by pain physician with long-acting morphine  She has history of acid reflux  Multiple other medical problems are listed in the past medical history  Father has some history of cardiovascular problem according to her without any clear description  Had EP study without inducible arrhythmias  Patient complains of low BP and feels erratic heart rate  Complains of fatigue and discomfort in the shoulder area which is chronic and complains her systolic blood pressure going as low as 60 and erratic heart rate with the symptoms and also claims intermittently sometimes she may have hypertension  Apparently her family members have diagnosis of pots syndrome          Past Medical History:   Diagnosis Date   • Allergic    • Anemia    • Anxiety    • Arthritis    • Asthma    • Cancer (HCC)     cervical 2008   • Chronic constipation    • Chronic pain disorder    • Depression    • Disease of thyroid gland    • Extremity pain    • Fibromyalgia, primary    • Headache    • Hx of migraines    • Injury of back    • Joint pain    • Low back pain    • Neck pain    • Peripheral neuropathy    • PTSD (post-traumatic stress disorder)    • Shortness of breath    • Stroke (HCC)     Mini heat  stroke   • Tachycardia      Past Surgical History:   Procedure Laterality Date   • CARDIAC ABLATION  2020   • CARDIAC CATHETERIZATION N/A 2019    Procedure: Coronary angiography;  Surgeon: Leo Fagan MD;  Location: [facility] CATH INVASIVE LOCATION;  Service: Cardiovascular   • CARDIAC CATHETERIZATION Left 2019    Procedure: Cardiac Catheterization/Vascular Study;  Surgeon: Leo Fagan MD;  Location: [facility] CATH INVASIVE LOCATION;  Service: Cardiovascular   • CARDIAC CATHETERIZATION N/A 2019    Procedure: Left ventriculography;  Surgeon: Leo Fagan MD;  Location: [facility] CATH INVASIVE LOCATION;  Service: Cardiovascular   • CARDIAC ELECTROPHYSIOLOGY PROCEDURE N/A 2020    Procedure: EP/Ablation;  Surgeon: Luther Walden MD;  Location: [facility] CATH INVASIVE LOCATION;  Service: Cardiovascular;  Laterality: N/A;   •  SECTION      x2   • CHOLECYSTECTOMY     • ENDOSCOPY     • GASTRIC SLEEVE LAPAROSCOPIC     • HAND SURGERY     • PERONEAL TENDON EXPLORATION Right 2020    Procedure: EXTENSOR HALLICUS LONGUS REPAIR AND NERVE DECOMPRESSION;  Surgeon: VALERIA Rajan DPM;  Location: [facility] MAIN OR;  Service: Podiatry;  Laterality: Right;   • TUBAL ABDOMINAL LIGATION           Review of Systems   General:  positive for fatigue and tiredness  Eyes: No redness  Cardiovascular: No chest pain, positive for  palpitations      Physical Exam  VITALS REVIEWED    General:      well developed, well nourished, in no acute distress.    Head:      normocephalic and atraumatic.    Eyes:      PERRL/EOM intact, conjunctiva and sclera clear with out nystagmus.    Neck:      no masses, thyromegaly, trachea central with normal respiratory effort  Lungs:      clear bilaterally to auscultation.    Heart:      regular rate and rhythm, S1, S2 without murmurs, rubs, or gallops  Bilateral knee reflexes were hyperreflexic  Patient was initially upset before I entered the room and she was much more calmer  after discussing the testing options.  Heart rate went up to nearly 100 on standing and slowly got better consistent with sinus tachycardia      Assessment plan    Intermittent symptoms of palpitations and documented wide QRS tachycardia--negative for inducible arrhythmias  Prior history of near syncope without any thomas syncope  Chronic pain syndrome  History of fibromyalgia  SX suggestive of POTS??  Low BP??--check tilt table testing to exclude pots syndrome  Check TSH  May need midodrine  Plenty of hydration and high salt intake educated  Patient also educated about intake of salt pills which are over-the-counter to help her symptoms of low blood pressure  Cannot totally exclude autonomic dysfunction and may need a tertiary referral which was discussed with the patient  Clearly does not have any focal arrhythmias and patient underwent extensive EP study in the past      ECG 12 Lead    Date/Time: 12/23/2021 3:41 PM  Performed by: Luther Walden MD  Authorized by: Luther Walden MD   Comparison: compared with previous ECG   Similar to previous ECG  Rhythm: sinus rhythm  Rate: normal  Conduction: conduction normal  QRS axis: normal  Other findings: non-specific ST-T wave changes          Electronically signed by Luther Walden MD, 12/23/21, 3:41 PM EST.

## 2022-01-06 ENCOUNTER — LAB (OUTPATIENT)
Dept: LAB | Facility: HOSPITAL | Age: 37
End: 2022-01-06

## 2022-01-06 ENCOUNTER — HOSPITAL ENCOUNTER (OUTPATIENT)
Dept: MRI IMAGING | Facility: HOSPITAL | Age: 37
Discharge: HOME | End: 2022-01-06

## 2022-01-06 DIAGNOSIS — R42 DIZZINESS: ICD-10-CM

## 2022-01-06 DIAGNOSIS — R00.2 PALPITATIONS: ICD-10-CM

## 2022-01-06 DIAGNOSIS — R47.89 EPISODES OF SPEECH ARREST: ICD-10-CM

## 2022-01-06 PROCEDURE — 84443 ASSAY THYROID STIM HORMONE: CPT

## 2022-01-06 PROCEDURE — 36415 COLL VENOUS BLD VENIPUNCTURE: CPT

## 2022-01-06 PROCEDURE — 25010000002 GADOTERIDOL PER 1 ML: Performed by: PSYCHIATRY & NEUROLOGY

## 2022-01-06 PROCEDURE — A9579 GAD-BASE MR CONTRAST NOS,1ML: HCPCS | Performed by: PSYCHIATRY & NEUROLOGY

## 2022-01-06 PROCEDURE — 70553 MRI BRAIN STEM W/O & W/DYE: CPT

## 2022-01-06 RX ADMIN — GADOTERIDOL 14 ML: 279.3 INJECTION, SOLUTION INTRAVENOUS at 15:44

## 2022-01-07 LAB — TSH SERPL DL<=0.05 MIU/L-ACNC: 1.47 UIU/ML (ref 0.27–4.2)

## 2022-01-11 ENCOUNTER — HOSPITAL ENCOUNTER (OUTPATIENT)
Dept: CARDIOLOGY | Facility: HOSPITAL | Age: 37
Discharge: HOME | End: 2022-01-11
Admitting: INTERNAL MEDICINE

## 2022-01-11 VITALS
BODY MASS INDEX: 27.03 KG/M2 | SYSTOLIC BLOOD PRESSURE: 104 MMHG | HEIGHT: 66 IN | OXYGEN SATURATION: 100 % | HEART RATE: 56 BPM | WEIGHT: 168.21 LBS | TEMPERATURE: 98.9 F | RESPIRATION RATE: 16 BRPM | DIASTOLIC BLOOD PRESSURE: 51 MMHG

## 2022-01-11 DIAGNOSIS — R00.2 PALPITATIONS: ICD-10-CM

## 2022-01-11 DIAGNOSIS — R55 NEAR SYNCOPE: ICD-10-CM

## 2022-01-11 DIAGNOSIS — R42 DIZZINESS: ICD-10-CM

## 2022-01-11 LAB
MAXIMAL PREDICTED HEART RATE: 185 BPM
STRESS TARGET HR: 157 BPM

## 2022-01-11 PROCEDURE — 93660 TILT TABLE EVALUATION: CPT

## 2022-01-11 PROCEDURE — 93660 TILT TABLE EVALUATION: CPT | Performed by: INTERNAL MEDICINE

## 2022-01-11 NOTE — DISCHARGE INSTRUCTIONS
Follow up with Dr Walden in 2 [phone]  Drink plenty  Of water the remainder of the day.  Report signs and symptoms of dizziness, passing out cold sweats as needed

## 2022-01-18 ENCOUNTER — HOSPITAL ENCOUNTER (OUTPATIENT)
Dept: NEUROLOGY | Facility: HOSPITAL | Age: 37
Discharge: HOME | End: 2022-01-18
Admitting: PSYCHIATRY & NEUROLOGY

## 2022-01-18 DIAGNOSIS — R47.89 EPISODES OF SPEECH ARREST: ICD-10-CM

## 2022-01-18 PROCEDURE — 95816 EEG AWAKE AND DROWSY: CPT

## 2022-01-18 PROCEDURE — 95816 EEG AWAKE AND DROWSY: CPT | Performed by: PSYCHIATRY & NEUROLOGY

## 2022-02-01 ENCOUNTER — OFFICE VISIT (OUTPATIENT)
Dept: PAIN MEDICINE | Facility: CLINIC | Age: 37
End: 2022-02-01

## 2022-02-01 VITALS
SYSTOLIC BLOOD PRESSURE: 128 MMHG | OXYGEN SATURATION: 97 % | HEIGHT: 65 IN | DIASTOLIC BLOOD PRESSURE: 77 MMHG | WEIGHT: 168 LBS | BODY MASS INDEX: 27.99 KG/M2 | RESPIRATION RATE: 16 BRPM | HEART RATE: 91 BPM

## 2022-02-01 DIAGNOSIS — M25.511 CHRONIC RIGHT SHOULDER PAIN: ICD-10-CM

## 2022-02-01 DIAGNOSIS — M79.7 FIBROMYALGIA: ICD-10-CM

## 2022-02-01 DIAGNOSIS — G89.29 NECK PAIN, CHRONIC: ICD-10-CM

## 2022-02-01 DIAGNOSIS — M54.2 NECK PAIN, CHRONIC: ICD-10-CM

## 2022-02-01 DIAGNOSIS — G89.29 CHRONIC RIGHT SHOULDER PAIN: ICD-10-CM

## 2022-02-01 DIAGNOSIS — M54.16 LUMBAR RADICULOPATHY: ICD-10-CM

## 2022-02-01 DIAGNOSIS — G89.29 CHRONIC MIDLINE LOW BACK PAIN, UNSPECIFIED WHETHER SCIATICA PRESENT: Primary | ICD-10-CM

## 2022-02-01 DIAGNOSIS — M54.50 CHRONIC MIDLINE LOW BACK PAIN, UNSPECIFIED WHETHER SCIATICA PRESENT: Primary | ICD-10-CM

## 2022-02-01 DIAGNOSIS — M46.1 SACROILIITIS, NOT ELSEWHERE CLASSIFIED: ICD-10-CM

## 2022-02-01 DIAGNOSIS — Z79.899 OTHER LONG TERM (CURRENT) DRUG THERAPY: ICD-10-CM

## 2022-02-01 DIAGNOSIS — K59.03 DRUG-INDUCED CONSTIPATION: ICD-10-CM

## 2022-02-01 PROCEDURE — 99214 OFFICE O/P EST MOD 30 MIN: CPT | Performed by: PHYSICAL MEDICINE & REHABILITATION

## 2022-02-01 RX ORDER — SUMATRIPTAN 25 MG/1
25 TABLET, FILM COATED ORAL
COMMUNITY
End: 2022-04-07 | Stop reason: SDUPTHER

## 2022-02-01 RX ORDER — MORPHINE SULFATE 30 MG/1
30 TABLET, FILM COATED, EXTENDED RELEASE ORAL 2 TIMES DAILY
Qty: 60 TABLET | Refills: 0 | Status: SHIPPED | OUTPATIENT
Start: 2022-02-01 | End: 2022-04-07

## 2022-02-01 RX ORDER — MORPHINE SULFATE 30 MG/1
30 TABLET, FILM COATED, EXTENDED RELEASE ORAL 2 TIMES DAILY
Qty: 60 TABLET | Refills: 0 | Status: SHIPPED | OUTPATIENT
Start: 2022-02-01 | End: 2022-04-26 | Stop reason: SDUPTHER

## 2022-02-01 NOTE — PROGRESS NOTES
"Subjective   Alyson Lew is a 36 y.o. female.     all over pain, especially back, neck and bilateral hip pain, right knee. Dz with fibromyalgia and inflammatory arthritis by Rheum, on DMARDs and Lyrica 100mg BID at initial visit with poor control, pain worsening, 10/10 at worst, 7/10 at best, always present, prevents work and housework, burning, sharp, varies. Prior notes reviewed, referred for worsening pain, does not have time for PT. Increased to Lyrica 100mg BID then TID, started Celebrex, Cymbalta 30mg then 60mg qHS, fewer flares but still severe fibromyalgia pain, also LBP and b/l hip pain. Started Norco 5/325mg, mostly taking qHS, some days BID with worsening pain with cold weather, becoming less effective, rotated to Percocet 5/325mg. C/o insomnia. Went to ED with severe pain not controlled with Percocet 5/325mg BID prn, now QID prn. Had gastric sleeve surgery, liquid Norco worked well, restarted Percocet with severe N/V, vomited up complete prescription. Started liquid Norco, working better, but pain worsening, having difficulty with ADLs, increased Norco and Lyrica, then MS-IR 15mg QID prn with pill . Worsening L \"hip\" pain, insomnia, constipation. Requesting NSAID. Had toni. Has FH of autoimmune disorders and MS, hasn't heard from Rheum. Needs PT before having injection, worsening SIJ pain even with PT, had b/l SIJ injections, helped, would like to repeat.    Also reports \"episodes\" where she loses the ability to speak c/w Broca's aphasia, resolves over 3-4 days spontaneously, had CT-brain in ED which was negative. Has h/o tachycardia, failed ablation, is not currently medicated.        The following portions of the patient's history were reviewed and updated as appropriate: allergies, current medications, past family history, past medical history, past social history, past surgical history and problem list.    Review of Systems   Constitutional: Negative for chills, fatigue and fever.   HENT: " Negative for hearing loss and trouble swallowing.    Eyes: Positive for visual disturbance.   Respiratory: Positive for shortness of breath.    Cardiovascular: Negative for chest pain.   Gastrointestinal: Positive for constipation. Negative for abdominal pain, diarrhea, nausea and vomiting.   Genitourinary: Negative for urinary incontinence.   Musculoskeletal: Positive for arthralgias, back pain and neck pain. Negative for joint swelling and myalgias.   Neurological: Positive for headache. Negative for dizziness, weakness and numbness.       Objective   Physical Exam   Constitutional: She is oriented to person, place, and time. She appears well-developed and well-nourished.   HENT:   Head: Normocephalic and atraumatic.   Eyes: Pupils are equal, round, and reactive to light.   Cardiovascular: Normal rate, regular rhythm, normal heart sounds and normal pulses.   No tachycardia or irregularity to pulses today   Pulmonary/Chest: Effort normal and breath sounds normal.   Abdominal: Soft. Bowel sounds are normal. She exhibits no distension. There is no abdominal tenderness.   Musculoskeletal: Tenderness present.      Comments: positive gianni finger test, TTP over b/l SIJ, b/l CARINA, and Gaenslens   Neurological: She is alert and oriented to person, place, and time. She has normal reflexes. She displays normal reflexes. No sensory deficit.   Psychiatric: Her behavior is normal. Thought content normal.         Assessment/Plan   Diagnoses and all orders for this visit:    1. Chronic midline low back pain, unspecified whether sciatica present (Primary)    2. Lumbar radiculopathy    3. Neck pain, chronic    4. Other long term (current) drug therapy    5. Drug-induced constipation    6. Fibromyalgia    7. Sacroiliitis, not elsewhere classified (HCC)    8. Chronic right shoulder pain        INspect reviewed, in order. Low risk. Repeat UDS 8/10/21 in order.  Increased to Lyrica 200mg TID, sedating, numbs her lips, but benefit  outweighs side effects.  Cont Cymbalta 60mg qHS, helping, and sleeping better.  Stopped Norco 5/325mg, failed Percocet 5/325mg, started liquid Norco 7.5/325mg/15ml 15ml QID prn, #1800. Increased to q4h prn, #2700, no longer working. Increased to MS-IR 15mg q6h prn, now MS-Contin 30mg BID, helping a lot more.  Failed Mobic 7.5mg qd - BID prn, failed Celebrex.  Cont Silenor.  Cont Relistor 450mg qdaily, failed Miralax, colace.  Began RxAlt #2 compounded cream.  Ordered Flector BID prn.  New referral to Rheum.  Referral to PT for LBP and L hip pain.  Performed b/l SIJ injection. Schedule repeat.  Referral to Dr. Seiple for possible h/o TIAs.  Referral to Cardiology for unmanaged (supraventricular?) tachycardia.  RTC for SIJ injections then in 3 months for f/u.

## 2022-02-08 DIAGNOSIS — M46.1 SACROILIITIS, NOT ELSEWHERE CLASSIFIED: Primary | ICD-10-CM

## 2022-02-15 ENCOUNTER — APPOINTMENT (OUTPATIENT)
Dept: PAIN MEDICINE | Facility: HOSPITAL | Age: 37
End: 2022-02-15

## 2022-04-06 NOTE — PROGRESS NOTES
Chief Complaint  Migraine (Confusional migraine)    Subjective          Alyson Lew presents to [facility] for aphasia  History of Present Illness   Patient is here to f/u on aphasia     she states her headaches has gotten worst since last visit 2-3 headaches per week , 2 of the imitrex  The ha stops after several hours.       Two spells since last visit.    She states she had loss of speech for about 1.5 days and gradually came back in a week span,she states she occ. Have some confusion about things.  Watching tv, confused, head twitching. Then massive headache. Took the medication, fell asleep woke with difficulty talking for a day. During the spell seemed to not remember that they had dogs.     The Depakote is hard to swallow.    Having fewer spells since starting the Depakote. But still having frequent headaches.                 ======EEG Report   1/18/22====  Impression:  Essentially normal EEG during wakefulness and early drowsiness.  Diffuse beta activities can be seen as a normal variant or due to sedative/hypnotic medications.  No focal or epileptiform activities were seen.                 =====MRI BRAIN W WO CONTRAST 1/6/22=====  IMPRESSION:     1. Acute pansinusitis.     2. No acute intracranial finding.     ====PREVIOUS OV 11/29/21 DR SEIPEL======  Patient was referred by Dr. Rivero for aphasia.      Pt. has spells of confusion and difficulty talking. Disorientation. Weakness in arms, difficulty forming words.  Duration more than 24 hours, mostly back to normal in 24 hours. First spell April 2021.  Last spell 3 weeks ago, and one or two other times. All spells about the same. Had ha after the spells, severe ha, bilateral, start in front and then the entire head. HA started several hours into the spells of confusion. Ha not improved with Excedrin, goes away with sleeping.      Once or twice per month has ha relieved with excedrin  MRI of brain April 2021 was normal       Once while watching TV  Unable to move for about one min. Not aware she had fallen asleep., not related to the above spells      ====er visit April 2021 for spell of ha and confusion===     36-year-old female seen earlier for chest pain went home and went to bed.  She states she woke up and had a headache with some left-sided weakness.  She states she felt confused and slow to respond.  The patient was noted to be alert and oriented with synthetic speech at triage.  The patient has had no reports of head trauma or syncope.  She denies chest pain shortness of breath diaphoresis or palpitations.  She denies abdominal pain nausea vomiting diaphoresis or palpitations.  She reports no focal neurologic defects or lateralizing neurologic signs.  She denies neck pain or stiffness.  Her history taking process was straightforward without any overt evidence of confusion, although the patient would occasionally pause before answering questions      She reported that she had been told once in the past she might have migraines but never since had this as a permanent diagnosis       The patient was noted to have no observable confusion throughout her ER stay.  The patient appears to have a variant migraine symptomatology                             Current Outpatient Medications:   •  albuterol sulfate  (90 Base) MCG/ACT inhaler, INHALE TWO (2) PUFFS BY MOUTH EVERY 4 HOURS AS NEEDED, Disp: 18 g, Rfl: 1  •  Diclofenac Epolamine (FLECTOR) 1.3 % patch patch, Apply 1 patch topically to the appropriate area as directed 2 (Two) Times a Day. As needed for back pain. Remove after 12 hours., Disp: 60 patch, Rfl: 11  •  divalproex (Depakote ER) 250 MG 24 hr tablet, Three per day for one week   Then take four per day., Disp: 360 tablet, Rfl: 3  •  levothyroxine (LEVO-T) 75 MCG tablet, Take 1 tablet by mouth Daily., Disp: 30 tablet, Rfl: 1  •  Morphine (MS CONTIN) 30 MG 12 hr tablet, Take 1 tablet by mouth 2 (Two) Times a Day.,  "Disp: 60 tablet, Rfl: 0  •  omeprazole (PrilOSEC) 40 MG capsule, Take 1 capsule by mouth Daily., Disp: 30 capsule, Rfl: 6  •  Pediatric Multiple Vit-C-FA (FLINSTONES GUMMIES OMEGA-3 DHA) chewable tablet, FLINSTONES GUMMIES OMEGA-3 DHA CHEW, Disp: , Rfl:   •  prochlorperazine (COMPAZINE) 10 MG tablet, Take 10 mg by mouth Every 6 (Six) Hours As Needed., Disp: , Rfl:   •  SUMAtriptan (IMITREX) 100 MG tablet, Take 1 tablet by mouth As Needed for Migraine. Take one tablet at onset of headache. May repeat dose one time in 2 hours if headache not relieved., Disp: 27 tablet, Rfl: 3  •  tiZANidine (ZANAFLEX) 4 MG tablet, Take 1 tablet by mouth Every 8 (Eight) Hours As Needed for Muscle Spasms., Disp: 90 tablet, Rfl: 11    Review of Systems   Constitutional: Negative for fatigue.   Neurological: Positive for speech difficulty and headaches.          Objective:    Vital Signs:   BP 91/65   Pulse 86   Temp 97.8 °F (36.6 °C) (Temporal)   Ht 165.1 cm (65\")   Wt 74.4 kg (164 lb)   BMI 27.29 kg/m²     Physical Exam  Vitals reviewed.   Cardiovascular:      Pulses: Normal pulses.   Pulmonary:      Effort: Pulmonary effort is normal. No respiratory distress.   Neurological:      Mental Status: She is alert.   Psychiatric:         Mood and Affect: Mood normal.        Result Review :                Neurologic Exam      Assessment and Plan    Diagnoses and all orders for this visit:    1. Insomnia, unspecified type (Primary)    Other orders  -     divalproex (Depakote ER) 250 MG 24 hr tablet; Three per day for one week   Then take four per day.  Dispense: 360 tablet; Refill: 3  -     SUMAtriptan (IMITREX) 100 MG tablet; Take 1 tablet by mouth As Needed for Migraine. Take one tablet at onset of headache. May repeat dose one time in 2 hours if headache not relieved.  Dispense: 27 tablet; Refill: 3         Follow Up   No follow-ups on file.  Patient was given instructions and counseling regarding her condition or for health maintenance " advice. Please see specific information pulled into the AVS if appropriate.     This document has been electronically signed by Joseph Seipel, MD on April 7, 2022 16:40 EDT

## 2022-04-07 ENCOUNTER — OFFICE VISIT (OUTPATIENT)
Dept: NEUROLOGY | Facility: CLINIC | Age: 37
End: 2022-04-07

## 2022-04-07 VITALS
SYSTOLIC BLOOD PRESSURE: 91 MMHG | BODY MASS INDEX: 27.32 KG/M2 | WEIGHT: 164 LBS | DIASTOLIC BLOOD PRESSURE: 65 MMHG | HEART RATE: 86 BPM | HEIGHT: 65 IN | TEMPERATURE: 97.8 F

## 2022-04-07 DIAGNOSIS — G47.00 INSOMNIA, UNSPECIFIED TYPE: ICD-10-CM

## 2022-04-07 DIAGNOSIS — G43.109 MIGRAINE EQUIVALENT SYNDROME: Primary | ICD-10-CM

## 2022-04-07 PROCEDURE — 99214 OFFICE O/P EST MOD 30 MIN: CPT | Performed by: PSYCHIATRY & NEUROLOGY

## 2022-04-07 RX ORDER — SUMATRIPTAN 100 MG/1
100 TABLET, FILM COATED ORAL AS NEEDED
Qty: 27 TABLET | Refills: 3 | Status: SHIPPED | OUTPATIENT
Start: 2022-04-07 | End: 2022-10-27 | Stop reason: SDUPTHER

## 2022-04-07 RX ORDER — DIVALPROEX SODIUM 250 MG/1
TABLET, EXTENDED RELEASE ORAL
Qty: 360 TABLET | Refills: 3 | Status: SHIPPED | OUTPATIENT
Start: 2022-04-07

## 2022-04-07 RX ORDER — SUMATRIPTAN 100 MG/1
TABLET, FILM COATED ORAL
COMMUNITY
Start: 2022-03-28 | End: 2022-04-07 | Stop reason: SDUPTHER

## 2022-04-26 ENCOUNTER — OFFICE VISIT (OUTPATIENT)
Dept: PAIN MEDICINE | Facility: CLINIC | Age: 37
End: 2022-04-26

## 2022-04-26 VITALS
SYSTOLIC BLOOD PRESSURE: 101 MMHG | HEART RATE: 87 BPM | DIASTOLIC BLOOD PRESSURE: 63 MMHG | BODY MASS INDEX: 27.29 KG/M2 | OXYGEN SATURATION: 97 % | WEIGHT: 164 LBS | RESPIRATION RATE: 16 BRPM

## 2022-04-26 DIAGNOSIS — M54.16 LUMBAR RADICULOPATHY: ICD-10-CM

## 2022-04-26 DIAGNOSIS — M54.50 CHRONIC MIDLINE LOW BACK PAIN, UNSPECIFIED WHETHER SCIATICA PRESENT: Primary | ICD-10-CM

## 2022-04-26 DIAGNOSIS — G89.29 CHRONIC RIGHT SHOULDER PAIN: ICD-10-CM

## 2022-04-26 DIAGNOSIS — K59.03 DRUG-INDUCED CONSTIPATION: ICD-10-CM

## 2022-04-26 DIAGNOSIS — M79.7 FIBROMYALGIA: ICD-10-CM

## 2022-04-26 DIAGNOSIS — G89.29 NECK PAIN, CHRONIC: ICD-10-CM

## 2022-04-26 DIAGNOSIS — M54.2 NECK PAIN, CHRONIC: ICD-10-CM

## 2022-04-26 DIAGNOSIS — M25.50 PAIN IN JOINT INVOLVING MULTIPLE SITES: ICD-10-CM

## 2022-04-26 DIAGNOSIS — G89.29 CHRONIC MIDLINE LOW BACK PAIN, UNSPECIFIED WHETHER SCIATICA PRESENT: Primary | ICD-10-CM

## 2022-04-26 DIAGNOSIS — M25.511 CHRONIC RIGHT SHOULDER PAIN: ICD-10-CM

## 2022-04-26 DIAGNOSIS — M46.1 SACROILIITIS, NOT ELSEWHERE CLASSIFIED: ICD-10-CM

## 2022-04-26 DIAGNOSIS — Z79.899 OTHER LONG TERM (CURRENT) DRUG THERAPY: ICD-10-CM

## 2022-04-26 DIAGNOSIS — M79.601 PAIN IN RIGHT ARM: ICD-10-CM

## 2022-04-26 PROCEDURE — 99213 OFFICE O/P EST LOW 20 MIN: CPT | Performed by: PHYSICAL MEDICINE & REHABILITATION

## 2022-04-26 RX ORDER — MORPHINE SULFATE 30 MG/1
30 TABLET, FILM COATED, EXTENDED RELEASE ORAL 2 TIMES DAILY
Qty: 60 TABLET | Refills: 0 | Status: SHIPPED | OUTPATIENT
Start: 2022-04-26 | End: 2022-07-14 | Stop reason: SDUPTHER

## 2022-04-26 RX ORDER — TIZANIDINE 4 MG/1
4 TABLET ORAL EVERY 8 HOURS PRN
Qty: 90 TABLET | Refills: 11 | Status: SHIPPED | OUTPATIENT
Start: 2022-04-26

## 2022-04-26 NOTE — PROGRESS NOTES
"Subjective   Alyson Lew is a 37 y.o. female.     all over pain, especially back, neck and bilateral hip pain, right knee. Dz with fibromyalgia and inflammatory arthritis by Rheum, on DMARDs and Lyrica 100mg BID at initial visit with poor control, pain worsening, 10/10 at worst, 7/10 at best, always present, prevents work and housework, burning, sharp, varies. Prior notes reviewed, referred for worsening pain, does not have time for PT. Increased to Lyrica 100mg BID then TID, started Celebrex, Cymbalta 30mg then 60mg qHS, fewer flares but still severe fibromyalgia pain, also LBP and b/l hip pain. Started Norco 5/325mg, mostly taking qHS, some days BID with worsening pain with cold weather, becoming less effective, rotated to Percocet 5/325mg. C/o insomnia. Went to ED with severe pain not controlled with Percocet 5/325mg BID prn, now QID prn. Had gastric sleeve surgery, liquid Norco worked well, restarted Percocet with severe N/V, vomited up complete prescription. Started liquid Norco, working better, but pain worsening, having difficulty with ADLs, increased Norco and Lyrica, then MS-IR 15mg QID prn with pill . Worsening L \"hip\" pain, insomnia, constipation. Requesting NSAID. Had toni. Has FH of autoimmune disorders and MS, hasn't heard from Rheum. Needs PT before having injection, worsening SIJ pain even with PT, had b/l SIJ injections, helped, would like to repeat.    Also reports \"episodes\" where she loses the ability to speak c/w Broca's aphasia, resolves over 3-4 days spontaneously, had CT-brain in ED which was negative. Has h/o tachycardia, failed ablation, is not currently medicated.        The following portions of the patient's history were reviewed and updated as appropriate: allergies, current medications, past family history, past medical history, past social history, past surgical history and problem list.    Review of Systems   Constitutional: Negative for chills, fatigue and fever.   HENT: " Negative for hearing loss and trouble swallowing.    Eyes: Positive for visual disturbance.   Respiratory: Positive for shortness of breath.    Cardiovascular: Negative for chest pain.   Gastrointestinal: Positive for constipation. Negative for abdominal pain, diarrhea, nausea and vomiting.   Genitourinary: Negative for urinary incontinence.   Musculoskeletal: Positive for arthralgias, back pain and neck pain. Negative for joint swelling and myalgias.   Neurological: Positive for headache. Negative for dizziness, weakness and numbness.       Objective   Physical Exam   Constitutional: She is oriented to person, place, and time. She appears well-developed and well-nourished.   HENT:   Head: Normocephalic and atraumatic.   Eyes: Pupils are equal, round, and reactive to light.   Cardiovascular: Normal rate, regular rhythm, normal heart sounds and normal pulses.   No tachycardia or irregularity to pulses today   Pulmonary/Chest: Effort normal and breath sounds normal.   Abdominal: Soft. Bowel sounds are normal. She exhibits no distension. There is no abdominal tenderness.   Musculoskeletal: Tenderness present.      Comments: positive gianni finger test, TTP over b/l SIJ, b/l CARINA, and Gaenslens   Neurological: She is alert and oriented to person, place, and time. She has normal reflexes. She displays normal reflexes. No sensory deficit.   Psychiatric: Her behavior is normal. Thought content normal.         Assessment/Plan   Diagnoses and all orders for this visit:    1. Chronic midline low back pain, unspecified whether sciatica present (Primary)    2. Neck pain, chronic    3. Drug-induced constipation    4. Fibromyalgia    5. Lumbar radiculopathy    6. Other long term (current) drug therapy    7. Pain in joint involving multiple sites    8. Pain in right arm    9. Sacroiliitis, not elsewhere classified (HCC)    10. Chronic right shoulder pain        INspect reviewed, in order. Low risk. Repeat UDS 8/10/21 in  order.  Increased to Lyrica 200mg TID, sedating, numbs her lips, but benefit outweighs side effects.  Cont Cymbalta 60mg qHS, helping, and sleeping better.  Stopped Norco 5/325mg, failed Percocet 5/325mg, started liquid Norco 7.5/325mg/15ml 15ml QID prn, #1800. Increased to q4h prn, #2700, no longer working. Increased to MS-IR 15mg q6h prn, now MS-Contin 30mg BID, helping a lot more.  Failed Mobic 7.5mg qd - BID prn, failed Celebrex.  Cont Silenor.  Cont Relistor 450mg qdaily, failed Miralax, colace.  Began RxAlt #2 compounded cream.  Ordered Flector BID prn.  New referral to Rheum.  Referral to PT for LBP and L hip pain.  Performed b/l SIJ injection. Repeat denied for no recent PT, but patient has failed PT in past.  Referral to Dr. Seiple for possible h/o TIAs.  Referral to Cardiology for unmanaged (supraventricular?) tachycardia.  RTC in 3 months for f/u.

## 2022-05-25 ENCOUNTER — TELEPHONE (OUTPATIENT)
Dept: PAIN MEDICINE | Facility: CLINIC | Age: 37
End: 2022-05-25

## 2022-05-25 NOTE — TELEPHONE ENCOUNTER
Caller: [facility]    Relationship to patient: PHARMACY    Best call back number:[phone]    Patient is needing: PHARMACY CALLING REGARDING TAZANIDINE AND MORHPINE SULPHATE THAT PATIENT IS TAKING CONCURRENTLY  TAKING THESE MEDS TOGETHER INCREASES ADVERSE SIDE EFFECTS PROBABILITY  PHARMACY NEEDS DX FOR EACH MEDICATION AS WELL AS WHETHER THE PROVIDER HAS ANY PLANS TO WEEN THE PATIENT OFF OF EITHER MEDICATION    RESPONSE CAN BE FAXD TO :  FAX: [phone]    UNABLE TO WARM TRANSFER

## 2022-05-26 NOTE — TELEPHONE ENCOUNTER
MS-contin has a diagnosis code on it, Tizanidine is Myalgia. I do not plan to wean off morphine, but is there a muscle relaxer they would feel more comfortable with?   normal

## 2022-06-01 ENCOUNTER — TELEPHONE (OUTPATIENT)
Dept: PAIN MEDICINE | Facility: CLINIC | Age: 37
End: 2022-06-01

## 2022-06-01 NOTE — TELEPHONE ENCOUNTER
Legally she can't fill earlier than 6/3, no matter what the reason. She may need to have someone pick it up for her here when it's due, and overnight it to [place].

## 2022-06-01 NOTE — TELEPHONE ENCOUNTER
Provider: ADALI  Caller: BARBARA  Phone Number: [phone]  Reason for Call: PT IS CALLING TO REPORT HER FATHER IS DYING AND SHE IS ON HER WAY TO [place]. SHE IS ASKING FOR EARLY REFILL ON HER MEDICATION. PLEASE CALL ASAP.

## 2022-06-02 NOTE — TELEPHONE ENCOUNTER
Hub staff attempted to follow warm transfer process and was unsuccessful     Caller: BARBARA [facility]    Relationship to patient: SELF    Best call back number: [phone]    Patient is needing: PT IS CALLING TO FOLLOW UP ON REFILL REQUEST. PLEASE CALL PT AND ADVISE.

## 2022-06-02 NOTE — TELEPHONE ENCOUNTER
Caller: PATIENT      Relationship to patient: SELF     Best call back number: [phone]    Patient is needing: PATIENT CALLED AGAIN TO FOLLOW UP ON REFILL REQUEST. ATTEMPTED TO WT BUT RECEIVED NO ANSWER. THANK YOU.

## 2022-07-14 ENCOUNTER — OFFICE VISIT (OUTPATIENT)
Dept: PAIN MEDICINE | Facility: CLINIC | Age: 37
End: 2022-07-14

## 2022-07-14 VITALS
RESPIRATION RATE: 16 BRPM | OXYGEN SATURATION: 97 % | WEIGHT: 164 LBS | BODY MASS INDEX: 27.29 KG/M2 | DIASTOLIC BLOOD PRESSURE: 60 MMHG | SYSTOLIC BLOOD PRESSURE: 107 MMHG | HEART RATE: 58 BPM

## 2022-07-14 DIAGNOSIS — G89.29 NECK PAIN, CHRONIC: ICD-10-CM

## 2022-07-14 DIAGNOSIS — G89.29 CHRONIC RIGHT SHOULDER PAIN: ICD-10-CM

## 2022-07-14 DIAGNOSIS — M54.2 NECK PAIN, CHRONIC: ICD-10-CM

## 2022-07-14 DIAGNOSIS — M79.7 FIBROMYALGIA: ICD-10-CM

## 2022-07-14 DIAGNOSIS — M46.1 SACROILIITIS, NOT ELSEWHERE CLASSIFIED: ICD-10-CM

## 2022-07-14 DIAGNOSIS — M54.50 CHRONIC MIDLINE LOW BACK PAIN, UNSPECIFIED WHETHER SCIATICA PRESENT: ICD-10-CM

## 2022-07-14 DIAGNOSIS — K59.03 DRUG-INDUCED CONSTIPATION: ICD-10-CM

## 2022-07-14 DIAGNOSIS — G89.29 CHRONIC MIDLINE LOW BACK PAIN, UNSPECIFIED WHETHER SCIATICA PRESENT: ICD-10-CM

## 2022-07-14 DIAGNOSIS — Z79.899 OTHER LONG TERM (CURRENT) DRUG THERAPY: Primary | ICD-10-CM

## 2022-07-14 DIAGNOSIS — M54.16 LUMBAR RADICULOPATHY: ICD-10-CM

## 2022-07-14 DIAGNOSIS — M25.511 CHRONIC RIGHT SHOULDER PAIN: ICD-10-CM

## 2022-07-14 DIAGNOSIS — M25.50 PAIN IN JOINT INVOLVING MULTIPLE SITES: ICD-10-CM

## 2022-07-14 PROCEDURE — 99214 OFFICE O/P EST MOD 30 MIN: CPT | Performed by: PHYSICAL MEDICINE & REHABILITATION

## 2022-07-14 RX ORDER — MORPHINE SULFATE 30 MG/1
30 TABLET, FILM COATED, EXTENDED RELEASE ORAL 2 TIMES DAILY
Qty: 60 TABLET | Refills: 0 | Status: SHIPPED | OUTPATIENT
Start: 2022-07-14 | End: 2022-10-25 | Stop reason: SDUPTHER

## 2022-07-14 RX ORDER — DOXEPIN HYDROCHLORIDE 6 MG/1
1 TABLET ORAL NIGHTLY PRN
Qty: 30 TABLET | Refills: 2 | Status: SHIPPED | OUTPATIENT
Start: 2022-07-14

## 2022-07-14 RX ORDER — GABAPENTIN 300 MG/1
300 CAPSULE ORAL 3 TIMES DAILY
Qty: 90 CAPSULE | Refills: 2 | Status: SHIPPED | OUTPATIENT
Start: 2022-07-14 | End: 2022-10-25

## 2022-07-14 NOTE — PROGRESS NOTES
"Subjective   Alyson Lew is a 37 y.o. female.     all over pain, especially back, neck and bilateral hip pain, right knee. Dz with fibromyalgia and inflammatory arthritis by Rheum, on DMARDs and Lyrica 100mg BID at initial visit with poor control, pain worsening, 10/10 at worst, 7/10 at best, always present, prevents work and housework, burning, sharp, varies. Prior notes reviewed, referred for worsening pain, does not have time for PT. Increased to Lyrica 100mg BID then TID, started Celebrex, Cymbalta 30mg then 60mg qHS, fewer flares but still severe fibromyalgia pain, also LBP and b/l hip pain. Started Norco 5/325mg, mostly taking qHS, some days BID with worsening pain with cold weather, becoming less effective, rotated to Percocet 5/325mg. C/o insomnia. Went to ED with severe pain not controlled with Percocet 5/325mg BID prn, now QID prn. Had gastric sleeve surgery, liquid Norco worked well, restarted Percocet with severe N/V, vomited up complete prescription. Started liquid Norco, working better, but pain worsening, having difficulty with ADLs, increased Norco and Lyrica, then MS-IR 15mg QID prn with pill . Worsening L \"hip\" pain, insomnia, constipation. Requesting NSAID. Had toni. Has FH of autoimmune disorders and MS, hasn't heard from Rheum. Needs PT before having injection, worsening SIJ pain even with PT, had b/l SIJ injections, helped, would like to repeat.    Also reports \"episodes\" where she loses the ability to speak c/w Broca's aphasia, resolves over 3-4 days spontaneously, had CT-brain in ED which was negative. Has h/o tachycardia, failed ablation, is not currently medicated.        The following portions of the patient's history were reviewed and updated as appropriate: allergies, current medications, past family history, past medical history, past social history, past surgical history and problem list.    Review of Systems   Constitutional: Negative for chills, fatigue and fever.   HENT: " Negative for hearing loss and trouble swallowing.    Eyes: Positive for visual disturbance.   Respiratory: Positive for shortness of breath.    Cardiovascular: Negative for chest pain.   Gastrointestinal: Positive for constipation. Negative for abdominal pain, diarrhea, nausea and vomiting.   Genitourinary: Negative for urinary incontinence.   Musculoskeletal: Positive for arthralgias, back pain and neck pain. Negative for joint swelling and myalgias.   Neurological: Positive for headache. Negative for dizziness, weakness and numbness.       Objective   Physical Exam   Constitutional: She is oriented to person, place, and time. She appears well-developed and well-nourished.   HENT:   Head: Normocephalic and atraumatic.   Eyes: Pupils are equal, round, and reactive to light.   Cardiovascular: Normal rate, regular rhythm, normal heart sounds and normal pulses.   No tachycardia or irregularity to pulses today   Pulmonary/Chest: Effort normal and breath sounds normal.   Abdominal: Soft. Bowel sounds are normal. She exhibits no distension. There is no abdominal tenderness.   Musculoskeletal: Tenderness present.      Comments: positive gianni finger test, TTP over b/l SIJ, b/l CARINA, and Gaenslens   Neurological: She is alert and oriented to person, place, and time. She has normal reflexes. She displays normal reflexes. No sensory deficit.   Psychiatric: Her behavior is normal. Thought content normal.         Assessment & Plan   Diagnoses and all orders for this visit:    1. Chronic midline low back pain, unspecified whether sciatica present (Primary)    2. Neck pain, chronic    3. Drug-induced constipation    4. Fibromyalgia    5. Lumbar radiculopathy    6. Other long term (current) drug therapy    7. Pain in joint involving multiple sites    8. Sacroiliitis, not elsewhere classified (HCC)    9. Chronic right shoulder pain        INspect reviewed, in order. Low risk. Repeat UDS 8/10/21 in order.  Increased to Lyrica  200mg TID, sedating, numbs her lips, but benefit outweighs side effects, not covered by insurance. Begin Gabapentin 300mg TID, titrated.  Cont Cymbalta 60mg qHS, helping, and sleeping better.  Stopped Norco 5/325mg, failed Percocet 5/325mg, started liquid Norco 7.5/325mg/15ml 15ml QID prn, #1800. Increased to q4h prn, #2700, no longer working. Increased to MS-IR 15mg q6h prn, now MS-Contin 30mg BID, helping a lot more.  Failed Mobic 7.5mg qd - BID prn, failed Celebrex.  Begin Silenor.  Cont Relistor 450mg qdaily, failed Miralax, colace.  Began RxAlt #2 compounded cream.  Ordered Flector BID prn.  New referral to Rheum.  Referral to PT for LBP and L hip pain.  Performed b/l SIJ injection. Repeat denied for no recent PT, but patient has failed PT in past.  Referral to Dr. Seiple for possible h/o TIAs.  Referral to Cardiology for unmanaged (supraventricular?) tachycardia.  RTC in 3 months for f/u.

## 2022-10-25 ENCOUNTER — OFFICE VISIT (OUTPATIENT)
Dept: PAIN MEDICINE | Facility: CLINIC | Age: 37
End: 2022-10-25

## 2022-10-25 VITALS
HEART RATE: 77 BPM | DIASTOLIC BLOOD PRESSURE: 63 MMHG | SYSTOLIC BLOOD PRESSURE: 114 MMHG | OXYGEN SATURATION: 99 % | RESPIRATION RATE: 16 BRPM

## 2022-10-25 DIAGNOSIS — Z79.899 OTHER LONG TERM (CURRENT) DRUG THERAPY: ICD-10-CM

## 2022-10-25 DIAGNOSIS — G89.29 NECK PAIN, CHRONIC: ICD-10-CM

## 2022-10-25 DIAGNOSIS — G89.29 CHRONIC RIGHT SHOULDER PAIN: ICD-10-CM

## 2022-10-25 DIAGNOSIS — M54.16 LUMBAR RADICULOPATHY: ICD-10-CM

## 2022-10-25 DIAGNOSIS — M54.2 NECK PAIN, CHRONIC: ICD-10-CM

## 2022-10-25 DIAGNOSIS — M54.50 CHRONIC MIDLINE LOW BACK PAIN, UNSPECIFIED WHETHER SCIATICA PRESENT: Primary | ICD-10-CM

## 2022-10-25 DIAGNOSIS — M46.1 SACROILIITIS, NOT ELSEWHERE CLASSIFIED: ICD-10-CM

## 2022-10-25 DIAGNOSIS — G89.29 CHRONIC MIDLINE LOW BACK PAIN, UNSPECIFIED WHETHER SCIATICA PRESENT: Primary | ICD-10-CM

## 2022-10-25 DIAGNOSIS — M25.50 PAIN IN JOINT INVOLVING MULTIPLE SITES: ICD-10-CM

## 2022-10-25 DIAGNOSIS — M79.7 FIBROMYALGIA: ICD-10-CM

## 2022-10-25 DIAGNOSIS — M25.511 CHRONIC RIGHT SHOULDER PAIN: ICD-10-CM

## 2022-10-25 DIAGNOSIS — K59.03 DRUG-INDUCED CONSTIPATION: ICD-10-CM

## 2022-10-25 PROCEDURE — 99214 OFFICE O/P EST MOD 30 MIN: CPT | Performed by: PHYSICAL MEDICINE & REHABILITATION

## 2022-10-25 RX ORDER — MORPHINE SULFATE 30 MG/1
30 TABLET, FILM COATED, EXTENDED RELEASE ORAL 2 TIMES DAILY
Qty: 60 TABLET | Refills: 0 | Status: SHIPPED | OUTPATIENT
Start: 2022-10-25 | End: 2022-10-27 | Stop reason: SDUPTHER

## 2022-10-25 RX ORDER — MORPHINE SULFATE 30 MG/1
30 TABLET, FILM COATED, EXTENDED RELEASE ORAL 2 TIMES DAILY
Qty: 60 TABLET | Refills: 0 | Status: SHIPPED | OUTPATIENT
Start: 2022-10-25 | End: 2023-01-31 | Stop reason: SDUPTHER

## 2022-10-25 RX ORDER — GABAPENTIN 400 MG/1
400 CAPSULE ORAL 3 TIMES DAILY
Qty: 90 CAPSULE | Refills: 2 | Status: SHIPPED | OUTPATIENT
Start: 2022-10-25 | End: 2023-01-31 | Stop reason: SDUPTHER

## 2022-10-25 NOTE — PROGRESS NOTES
"Subjective   Alyson Lew is a 37 y.o. female.     History of Present Illness  all over pain, especially back, neck and bilateral hip pain, right knee. Dz with fibromyalgia and inflammatory arthritis by Rheum, on DMARDs and Lyrica 100mg BID at initial visit with poor control, pain worsening, 10/10 at worst, 7/10 at best, always present, prevents work and housework, burning, sharp, varies. Prior notes reviewed, referred for worsening pain, does not have time for PT. Increased to Lyrica 100mg BID then TID, started Celebrex, Cymbalta 30mg then 60mg qHS, fewer flares but still severe fibromyalgia pain, also LBP and b/l hip pain. Started Norco 5/325mg, mostly taking qHS, some days BID with worsening pain with cold weather, becoming less effective, rotated to Percocet 5/325mg. C/o insomnia. Went to ED with severe pain not controlled with Percocet 5/325mg BID prn, now QID prn. Had gastric sleeve surgery, liquid Norco worked well, restarted Percocet with severe N/V, vomited up complete prescription. Started liquid Norco, working better, but pain worsening, having difficulty with ADLs, increased Norco and Lyrica, then MS-IR 15mg QID prn with pill . Worsening L \"hip\" pain, insomnia, constipation. Requesting NSAID. Had toni. Has FH of autoimmune disorders and MS, hasn't heard from Rheum. Needs PT before having injection, worsening SIJ pain even with PT, had b/l SIJ injections, helped, would like to repeat.    Also reports \"episodes\" where she loses the ability to speak c/w Broca's aphasia, resolves over 3-4 days spontaneously, had CT-brain in ED which was negative. Has h/o tachycardia, failed ablation, is not currently medicated.        The following portions of the patient's history were reviewed and updated as appropriate: allergies, current medications, past family history, past medical history, past social history, past surgical history and problem list.    Review of Systems   Constitutional: Negative for " chills, fatigue and fever.   HENT: Negative for hearing loss and trouble swallowing.    Eyes: Positive for visual disturbance.   Respiratory: Positive for shortness of breath.    Cardiovascular: Negative for chest pain.   Gastrointestinal: Positive for constipation. Negative for abdominal pain, diarrhea, nausea and vomiting.   Genitourinary: Negative for urinary incontinence.   Musculoskeletal: Positive for arthralgias, back pain and neck pain. Negative for joint swelling and myalgias.   Neurological: Positive for headache. Negative for dizziness, weakness and numbness.       Objective   Physical Exam   Constitutional: She is oriented to person, place, and time. She appears well-developed and well-nourished.   HENT:   Head: Normocephalic and atraumatic.   Eyes: Pupils are equal, round, and reactive to light.   Cardiovascular: Normal rate, regular rhythm, normal heart sounds and normal pulses.   No tachycardia or irregularity to pulses today   Pulmonary/Chest: Effort normal and breath sounds normal.   Abdominal: Soft. Bowel sounds are normal. She exhibits no distension. There is no abdominal tenderness.   Musculoskeletal: Tenderness present.      Comments: positive gianni finger test, TTP over b/l SIJ, b/l CARINA, and Gaenslens   Neurological: She is alert and oriented to person, place, and time. She has normal reflexes. She displays normal reflexes. No sensory deficit.   Psychiatric: Her behavior is normal. Thought content normal.         Assessment & Plan   Diagnoses and all orders for this visit:    1. Chronic midline low back pain, unspecified whether sciatica present (Primary)    2. Lumbar radiculopathy    3. Neck pain, chronic    4. Drug-induced constipation    5. Fibromyalgia    6. Pain in joint involving multiple sites    7. Other long term (current) drug therapy    8. Sacroiliitis, not elsewhere classified (HCC)    9. Chronic right shoulder pain        INspect reviewed, in order. Low risk. Repeat 7/14/22 in  order.  Increased to Lyrica 200mg TID, sedating, numbs her lips, but benefit outweighs side effects, not covered by insurance. Began Gabapentin 300mg TID, titrated, tolerating well, increase to 400mg TID.  Cont Cymbalta 60mg qHS, helping, and sleeping better.  Stopped Norco 5/325mg, failed Percocet 5/325mg, started liquid Norco 7.5/325mg/15ml 15ml QID prn, #1800. Increased to q4h prn, #2700, no longer working. Increased to MS-IR 15mg q6h prn, now MS-Contin 30mg BID, helping a lot more.  Failed Mobic 7.5mg qd - BID prn, failed Celebrex.  Begin Silenor.  Cont Relistor 450mg qdaily, failed Miralax, colace.  Began RxAlt #2 compounded cream.  Ordered Flector BID prn.  New referral to Rheum.  Referral to PT for LBP and L hip pain.  Performed b/l SIJ injection. Repeat denied for no recent PT, but patient has failed PT in past.  Referral to Dr. Seiple for possible h/o TIAs.  Referral to Cardiology for unmanaged (supraventricular?) tachycardia.  RTC in 3 months for f/u.

## 2022-10-27 ENCOUNTER — OFFICE VISIT (OUTPATIENT)
Dept: NEUROLOGY | Facility: CLINIC | Age: 37
End: 2022-10-27

## 2022-10-27 VITALS
BODY MASS INDEX: 26.49 KG/M2 | WEIGHT: 159 LBS | HEART RATE: 77 BPM | SYSTOLIC BLOOD PRESSURE: 116 MMHG | TEMPERATURE: 98.4 F | DIASTOLIC BLOOD PRESSURE: 78 MMHG | HEIGHT: 65 IN

## 2022-10-27 DIAGNOSIS — R47.89 EPISODES OF SPEECH ARREST: ICD-10-CM

## 2022-10-27 DIAGNOSIS — G43.109 MIGRAINE EQUIVALENT SYNDROME: Primary | ICD-10-CM

## 2022-10-27 PROCEDURE — 99212 OFFICE O/P EST SF 10 MIN: CPT | Performed by: PSYCHIATRY & NEUROLOGY

## 2022-10-27 RX ORDER — SUMATRIPTAN 20 MG/1
1 SPRAY NASAL
Qty: 6 EACH | Refills: 3 | Status: SHIPPED | OUTPATIENT
Start: 2022-10-27 | End: 2023-03-08

## 2022-10-27 RX ORDER — SUMATRIPTAN 100 MG/1
100 TABLET, FILM COATED ORAL AS NEEDED
Qty: 27 TABLET | Refills: 3 | Status: SHIPPED | OUTPATIENT
Start: 2022-10-27 | End: 2022-11-28

## 2022-10-27 NOTE — PROGRESS NOTES
Chief Complaint  Follow-up    Subjective          Alyson Lew presents to [facility] for   History of Present Illness  Patient is here to f/u headaches   she states headaches has been worse since last visit she states she currently has an headache today in office on the right side side of head going back and her imitrex is not helping , she also states she think she is having seizures, she states her last seizure like episode was about 2 days ago she states she woke up asking questions about things that didn't pertain to anything, speech and memory  was off.   She takes Depakote  mg 2 bid    Had spell that took 2.5 months to recover    More frequent seizures and headaches  Some seizure wo ha  Spells vary.   Body will lock up and can't move. With legs shaking, struggled to walk back to bedroom  In 15 to 20 min then fine but speech gets choppy, difficulty talking struggles to talk. Takes several days to recover to several months    Location of ha right side of head, pulsatinging dull pain  Some times can be on the other side.   Duration of ha usually about one day.   Frequency of ha one or two times per week  Then can go week or two wo a headache.             ===previous ov 4/7/22 dr seipel====  Patient is here to f/u on aphasia      she states her headaches has gotten worst since last visit 2-3 headaches per week , 2 of the imitrex  The ha stops after several hours.         Two spells since last visit.     She states she had loss of speech for about 1.5 days and gradually came back in a week span,she states she occ. Have some confusion about things.  Watching tv, confused, head twitching. Then massive headache. Took the medication, fell asleep woke with difficulty talking for a day. During the spell seemed to not remember that they had dogs.      The Depakote is hard to swallow.     Having fewer spells since starting the Depakote. But still having frequent headaches.     Current  Outpatient Medications:   •  albuterol sulfate  (90 Base) MCG/ACT inhaler, INHALE TWO (2) PUFFS BY MOUTH EVERY 4 HOURS AS NEEDED, Disp: 18 g, Rfl: 1  •  Diclofenac Epolamine (FLECTOR) 1.3 % patch patch, Apply 1 patch topically to the appropriate area as directed 2 (Two) Times a Day. As needed for back pain. Remove after 12 hours., Disp: 60 patch, Rfl: 11  •  divalproex (Depakote ER) 250 MG 24 hr tablet, Three per day for one week   Then take four per day., Disp: 360 tablet, Rfl: 3  •  Doxepin HCl 6 MG tablet, Take 1 tablet by mouth At Night As Needed (insomnia)., Disp: 30 tablet, Rfl: 2  •  gabapentin (NEURONTIN) 400 MG capsule, Take 1 capsule by mouth 3 (Three) Times a Day., Disp: 90 capsule, Rfl: 2  •  levothyroxine (LEVO-T) 75 MCG tablet, Take 1 tablet by mouth Daily., Disp: 30 tablet, Rfl: 1  •  Morphine (MS CONTIN) 30 MG 12 hr tablet, Take 1 tablet by mouth 2 (Two) Times a Day., Disp: 60 tablet, Rfl: 0  •  omeprazole (PrilOSEC) 40 MG capsule, Take 1 capsule by mouth Daily., Disp: 30 capsule, Rfl: 6  •  prochlorperazine (COMPAZINE) 10 MG tablet, Take 10 mg by mouth Every 6 (Six) Hours As Needed., Disp: , Rfl:   •  SUMAtriptan (IMITREX) 100 MG tablet, Take 1 tablet by mouth As Needed for Migraine. Take one tablet at onset of headache. May repeat dose one time in 2 hours if headache not relieved., Disp: 27 tablet, Rfl: 3  •  tiZANidine (ZANAFLEX) 4 MG tablet, Take 1 tablet by mouth Every 8 (Eight) Hours As Needed for Muscle Spasms., Disp: 90 tablet, Rfl: 11  •  Pediatric Multiple Vit-C-FA (FLINSTONES GUMMIES OMEGA-3 DHA) chewable tablet, FLINSTONES GUMMIES OMEGA-3 DHA CHEW, Disp: , Rfl:   •  SUMAtriptan (IMITREX) 20 MG/ACT nasal spray, 1 spray into the nostril(s) as directed by provider Every 2 (Two) Hours As Needed for Migraine., Disp: 6 each, Rfl: 3    Review of Systems   Constitutional: Negative for activity change.   Neurological: Positive for seizures and headaches.   All other systems reviewed and are  "negative.         Objective:    Vital Signs:   /78   Pulse 77   Temp 98.4 °F (36.9 °C) (Infrared)   Ht 165.1 cm (65\")   Wt 72.1 kg (159 lb)   BMI 26.46 kg/m²     Physical Exam  Vitals reviewed.   Constitutional:       Appearance: Normal appearance.   Pulmonary:      Effort: Pulmonary effort is normal. No respiratory distress.   Neurological:      Mental Status: She is alert and oriented to person, place, and time. Mental status is at baseline.   Psychiatric:         Behavior: Behavior normal.        Result Review :                Neurologic Exam     Mental Status   Oriented to person, place, and time.         Assessment and Plan    Diagnoses and all orders for this visit:    1. Migraine equivalent syndrome (Primary)  -     Valproic Acid Level, Total; Future  -     CBC & Differential; Future  -     Comprehensive Metabolic Panel; Future  -     SUMAtriptan (IMITREX) 100 MG tablet; Take 1 tablet by mouth As Needed for Migraine. Take one tablet at onset of headache. May repeat dose one time in 2 hours if headache not relieved.  Dispense: 27 tablet; Refill: 3  -     SUMAtriptan (IMITREX) 20 MG/ACT nasal spray; 1 spray into the nostril(s) as directed by provider Every 2 (Two) Hours As Needed for Migraine.  Dispense: 6 each; Refill: 3    2. Episodes of speech arrest       Will check depakote level and possibly increase the dose  Continue imitrex prn add imitrex nasal spray as alternative to tab,         Follow Up   Return in about 6 months (around 4/27/2023).  Patient was given instructions and counseling regarding her condition or for health maintenance advice. Please see specific information pulled into the AVS if appropriate.     This document has been electronically signed by Joseph Seipel, MD on October 27, 2022 16:43 EDT      "

## 2022-11-25 DIAGNOSIS — G43.109 MIGRAINE EQUIVALENT SYNDROME: ICD-10-CM

## 2022-11-28 RX ORDER — SUMATRIPTAN 100 MG/1
TABLET, FILM COATED ORAL
Qty: 27 TABLET | Refills: 3 | Status: SHIPPED | OUTPATIENT
Start: 2022-11-28

## 2023-01-31 ENCOUNTER — TELEPHONE (OUTPATIENT)
Dept: PAIN MEDICINE | Facility: CLINIC | Age: 38
End: 2023-01-31

## 2023-01-31 ENCOUNTER — OFFICE VISIT (OUTPATIENT)
Dept: PAIN MEDICINE | Facility: CLINIC | Age: 38
End: 2023-01-31
Payer: MEDICAID

## 2023-01-31 VITALS
SYSTOLIC BLOOD PRESSURE: 108 MMHG | DIASTOLIC BLOOD PRESSURE: 58 MMHG | OXYGEN SATURATION: 96 % | HEART RATE: 92 BPM | RESPIRATION RATE: 16 BRPM

## 2023-01-31 DIAGNOSIS — G89.29 CHRONIC RIGHT SHOULDER PAIN: ICD-10-CM

## 2023-01-31 DIAGNOSIS — K59.03 DRUG-INDUCED CONSTIPATION: ICD-10-CM

## 2023-01-31 DIAGNOSIS — M25.511 CHRONIC RIGHT SHOULDER PAIN: ICD-10-CM

## 2023-01-31 DIAGNOSIS — G89.29 NECK PAIN, CHRONIC: ICD-10-CM

## 2023-01-31 DIAGNOSIS — M25.50 PAIN IN JOINT INVOLVING MULTIPLE SITES: ICD-10-CM

## 2023-01-31 DIAGNOSIS — M54.50 CHRONIC MIDLINE LOW BACK PAIN, UNSPECIFIED WHETHER SCIATICA PRESENT: Primary | ICD-10-CM

## 2023-01-31 DIAGNOSIS — Z79.899 OTHER LONG TERM (CURRENT) DRUG THERAPY: ICD-10-CM

## 2023-01-31 DIAGNOSIS — M54.2 NECK PAIN, CHRONIC: ICD-10-CM

## 2023-01-31 DIAGNOSIS — M79.7 FIBROMYALGIA: ICD-10-CM

## 2023-01-31 DIAGNOSIS — M46.1 SACROILIITIS, NOT ELSEWHERE CLASSIFIED: ICD-10-CM

## 2023-01-31 DIAGNOSIS — G89.29 CHRONIC MIDLINE LOW BACK PAIN, UNSPECIFIED WHETHER SCIATICA PRESENT: Primary | ICD-10-CM

## 2023-01-31 DIAGNOSIS — M54.16 LUMBAR RADICULOPATHY: ICD-10-CM

## 2023-01-31 PROCEDURE — 99214 OFFICE O/P EST MOD 30 MIN: CPT | Performed by: PHYSICAL MEDICINE & REHABILITATION

## 2023-01-31 RX ORDER — MORPHINE SULFATE 30 MG/1
30 TABLET, FILM COATED, EXTENDED RELEASE ORAL 2 TIMES DAILY
Qty: 60 TABLET | Refills: 0 | Status: SHIPPED | OUTPATIENT
Start: 2023-01-31

## 2023-01-31 RX ORDER — GABAPENTIN 400 MG/1
400 CAPSULE ORAL 4 TIMES DAILY
Qty: 120 CAPSULE | Refills: 2 | Status: SHIPPED | OUTPATIENT
Start: 2023-01-31

## 2023-01-31 NOTE — PROGRESS NOTES
"Subjective   Alyson Lew is a 37 y.o. female.     History of Present Illness  all over pain, especially back, neck and bilateral hip pain, right knee. Dz with fibromyalgia and inflammatory arthritis by Rheum, on DMARDs and Lyrica 100mg BID at initial visit with poor control, pain worsening, 10/10 at worst, 7/10 at best, always present, prevents work and housework, burning, sharp, varies. Prior notes reviewed, referred for worsening pain, does not have time for PT. Increased to Lyrica 100mg BID then TID, started Celebrex, Cymbalta 30mg then 60mg qHS, fewer flares but still severe fibromyalgia pain, also LBP and b/l hip pain. Started Norco 5/325mg, mostly taking qHS, some days BID with worsening pain with cold weather, becoming less effective, rotated to Percocet 5/325mg. C/o insomnia. Went to ED with severe pain not controlled with Percocet 5/325mg BID prn, now QID prn. Had gastric sleeve surgery, liquid Norco worked well, restarted Percocet with severe N/V, vomited up complete prescription. Started liquid Norco, working better, but pain worsening, having difficulty with ADLs, increased Norco and Lyrica, then MS-IR 15mg QID prn with pill . Worsening L \"hip\" pain, insomnia, constipation. Requesting NSAID. Had toni. Has FH of autoimmune disorders and MS, hasn't heard from Rheum. Needs PT before having injection, worsening SIJ pain even with PT, had b/l SIJ injections, helped, would like to repeat.    Also reports \"episodes\" where she loses the ability to speak c/w Broca's aphasia, resolves over 3-4 days spontaneously, had CT-brain in ED which was negative. Has h/o tachycardia, failed ablation, is not currently medicated.        The following portions of the patient's history were reviewed and updated as appropriate: allergies, current medications, past family history, past medical history, past social history, past surgical history and problem list.    Review of Systems   Constitutional: Negative for " chills, fatigue and fever.   HENT: Negative for hearing loss and trouble swallowing.    Eyes: Positive for visual disturbance.   Respiratory: Positive for shortness of breath.    Cardiovascular: Negative for chest pain.   Gastrointestinal: Positive for constipation. Negative for abdominal pain, diarrhea, nausea and vomiting.   Genitourinary: Negative for urinary incontinence.   Musculoskeletal: Positive for arthralgias, back pain and neck pain. Negative for joint swelling and myalgias.   Neurological: Positive for headache. Negative for dizziness, weakness and numbness.       Objective   Physical Exam   Constitutional: She is oriented to person, place, and time. She appears well-developed and well-nourished.   HENT:   Head: Normocephalic and atraumatic.   Eyes: Pupils are equal, round, and reactive to light.   Cardiovascular: Normal rate, regular rhythm, normal heart sounds and normal pulses.   No tachycardia or irregularity to pulses today   Pulmonary/Chest: Effort normal and breath sounds normal.   Abdominal: Soft. Bowel sounds are normal. She exhibits no distension. There is no abdominal tenderness.   Musculoskeletal: Tenderness present.      Comments: positive gianni finger test, TTP over b/l SIJ, b/l CARINA, and Gaenslens   Neurological: She is alert and oriented to person, place, and time. She has normal reflexes. She displays normal reflexes. No sensory deficit.   Psychiatric: Her behavior is normal. Thought content normal.         Assessment & Plan   Diagnoses and all orders for this visit:    1. Chronic midline low back pain, unspecified whether sciatica present (Primary)    2. Neck pain, chronic    3. Lumbar radiculopathy    4. Drug-induced constipation    5. Fibromyalgia    6. Pain in joint involving multiple sites    7. Other long term (current) drug therapy    8. Sacroiliitis, not elsewhere classified (HCC)    9. Chronic right shoulder pain        INspect reviewed, in order. Low risk. Repeat 7/14/22 in  order.  Increased to Lyrica 200mg TID, sedating, numbs her lips, but benefit outweighs side effects, not covered by insurance. Began Gabapentin 300mg TID, titrated, tolerating well, increased to 400mg TID, increase to 400mg QID.  Cont Cymbalta 60mg qHS, helping, and sleeping better.  Stopped Norco 5/325mg, failed Percocet 5/325mg, started liquid Norco 7.5/325mg/15ml 15ml QID prn, #1800. Increased to q4h prn, #2700, no longer working. Increased to MS-IR 15mg q6h prn, now MS-Contin 30mg BID, helping a lot more.  Failed Mobic 7.5mg qd - BID prn, failed Celebrex.  Begin Silenor.  Cont Relistor 450mg qdaily, failed Miralax, colace.  Began RxAlt #2 compounded cream.  Ordered Flector BID prn.  New referral to Rheum.  Referral to PT for LBP and L hip pain.  Performed b/l SIJ injection. Repeat denied for no recent PT, but patient has failed PT in past.  Referral to Dr. Seiple for possible h/o TIAs.  Referral to Cardiology for unmanaged (supraventricular?) tachycardia.  RTC in 3 months for f/u.

## 2023-01-31 NOTE — TELEPHONE ENCOUNTER
ATTEMPTED TO WARM TRANSFER      Caller: BARBARA DE LEON    Relationship to patient: SELF    Best call back number:  [phone]    Chief complaint: PATIENT HAS F/U APPT. TODAY WITH DR. BRO AT 1:00. PATIENT SAYS [place] AND 3 CHILDREN AT HOME. ASKING IF CAN HAVE A TELEPHONE VISIT.    Type of visit:  F/U

## 2023-01-31 NOTE — TELEPHONE ENCOUNTER
Caller: BARBARA DE LEON    Relationship to patient: SELF    Best call back number:     Patient is needing: UNABLE TO WARM TRANSFER.  PATIENT IS TRYING TO GET THERE RUNNING A FEW MINUTES LATE SHOULD BE THERE .  SHE IS REALLY LOW ON MEDICATION AS WELL

## 2023-02-27 ENCOUNTER — TELEPHONE (OUTPATIENT)
Dept: PAIN MEDICINE | Facility: CLINIC | Age: 38
End: 2023-02-27
Payer: MEDICAID

## 2023-02-27 NOTE — TELEPHONE ENCOUNTER
Caller: BARBARA DE LEON    Relationship to patient: SELF    Best call back number: [phone]    Patient is needing: PATIENT STATES HER CURRENT MEDICATION (MORPHINE) IS NOT HELPING.  SHE IS NOT GETTING ANY RELIEF OR NOT LASTING UNTIL TIME FOR THE NEXT DOSE

## 2023-02-28 ENCOUNTER — OFFICE VISIT (OUTPATIENT)
Dept: PAIN MEDICINE | Facility: CLINIC | Age: 38
End: 2023-02-28
Payer: MEDICAID

## 2023-02-28 ENCOUNTER — TELEPHONE (OUTPATIENT)
Dept: PAIN MEDICINE | Facility: CLINIC | Age: 38
End: 2023-02-28

## 2023-02-28 VITALS
HEART RATE: 84 BPM | SYSTOLIC BLOOD PRESSURE: 117 MMHG | OXYGEN SATURATION: 97 % | RESPIRATION RATE: 16 BRPM | DIASTOLIC BLOOD PRESSURE: 72 MMHG

## 2023-02-28 DIAGNOSIS — G89.29 CHRONIC RIGHT SHOULDER PAIN: ICD-10-CM

## 2023-02-28 DIAGNOSIS — M25.50 PAIN IN JOINT INVOLVING MULTIPLE SITES: ICD-10-CM

## 2023-02-28 DIAGNOSIS — M54.16 LUMBAR RADICULOPATHY: ICD-10-CM

## 2023-02-28 DIAGNOSIS — M54.2 NECK PAIN, CHRONIC: ICD-10-CM

## 2023-02-28 DIAGNOSIS — M79.7 FIBROMYALGIA: ICD-10-CM

## 2023-02-28 DIAGNOSIS — Z79.899 OTHER LONG TERM (CURRENT) DRUG THERAPY: ICD-10-CM

## 2023-02-28 DIAGNOSIS — M46.1 SACROILIITIS, NOT ELSEWHERE CLASSIFIED: ICD-10-CM

## 2023-02-28 DIAGNOSIS — G89.29 CHRONIC MIDLINE LOW BACK PAIN, UNSPECIFIED WHETHER SCIATICA PRESENT: Primary | ICD-10-CM

## 2023-02-28 DIAGNOSIS — K59.03 DRUG-INDUCED CONSTIPATION: ICD-10-CM

## 2023-02-28 DIAGNOSIS — G89.29 NECK PAIN, CHRONIC: ICD-10-CM

## 2023-02-28 DIAGNOSIS — M25.511 CHRONIC RIGHT SHOULDER PAIN: ICD-10-CM

## 2023-02-28 DIAGNOSIS — M54.50 CHRONIC MIDLINE LOW BACK PAIN, UNSPECIFIED WHETHER SCIATICA PRESENT: Primary | ICD-10-CM

## 2023-02-28 PROCEDURE — 99214 OFFICE O/P EST MOD 30 MIN: CPT | Performed by: PHYSICAL MEDICINE & REHABILITATION

## 2023-02-28 RX ORDER — OXYCODONE AND ACETAMINOPHEN 10; 325 MG/1; MG/1
1 TABLET ORAL EVERY 6 HOURS PRN
Qty: 120 TABLET | Refills: 0 | Status: SHIPPED | OUTPATIENT
Start: 2023-02-28

## 2023-02-28 RX ORDER — OXYCODONE AND ACETAMINOPHEN 10; 325 MG/1; MG/1
1 TABLET ORAL EVERY 6 HOURS PRN
Qty: 120 TABLET | Refills: 0 | Status: SHIPPED | OUTPATIENT
Start: 2023-02-28 | End: 2023-03-17 | Stop reason: SDUPTHER

## 2023-02-28 NOTE — PROGRESS NOTES
"Subjective   Alyson Lew is a 37 y.o. female.     History of Present Illness  all over pain, especially back, neck and bilateral hip pain, right knee. Dz with fibromyalgia and inflammatory arthritis by Rheum, on DMARDs and Lyrica 100mg BID at initial visit with poor control, pain worsening, 10/10 at worst, 7/10 at best, always present, prevents work and housework, burning, sharp, varies. Prior notes reviewed, referred for worsening pain, does not have time for PT. Increased to Lyrica 100mg BID then TID, started Celebrex, Cymbalta 30mg then 60mg qHS, fewer flares but still severe fibromyalgia pain, also LBP and b/l hip pain. Started Norco 5/325mg, mostly taking qHS, some days BID with worsening pain with cold weather, becoming less effective, rotated to Percocet 5/325mg. C/o insomnia. Went to ED with severe pain not controlled with Percocet 5/325mg BID prn, now QID prn. Had gastric sleeve surgery, liquid Norco worked well, restarted Percocet with severe N/V, vomited up complete prescription. Started liquid Norco, working better, but pain worsening, having difficulty with ADLs, increased Norco and Lyrica, then MS-IR 15mg QID prn with pill . Worsening L \"hip\" pain, insomnia, constipation. Requesting NSAID. Had toni. Has FH of autoimmune disorders and MS, hasn't heard from Rheum. Needs PT before having injection, worsening SIJ pain even with PT, had b/l SIJ injections, helped, would like to repeat.    Also reports \"episodes\" where she loses the ability to speak c/w Broca's aphasia, resolves over 3-4 days spontaneously, had CT-brain in ED which was negative. Has h/o tachycardia, failed ablation, is not currently medicated.        The following portions of the patient's history were reviewed and updated as appropriate: allergies, current medications, past family history, past medical history, past social history, past surgical history and problem list.    Review of Systems   Constitutional: Negative for " chills, fatigue and fever.   HENT: Negative for hearing loss and trouble swallowing.    Eyes: Positive for visual disturbance.   Respiratory: Positive for shortness of breath.    Cardiovascular: Negative for chest pain.   Gastrointestinal: Positive for constipation. Negative for abdominal pain, diarrhea, nausea and vomiting.   Genitourinary: Negative for urinary incontinence.   Musculoskeletal: Positive for arthralgias, back pain and neck pain. Negative for joint swelling and myalgias.   Neurological: Positive for headache. Negative for dizziness, weakness and numbness.       Objective   Physical Exam   Constitutional: She is oriented to person, place, and time. She appears well-developed and well-nourished.   HENT:   Head: Normocephalic and atraumatic.   Eyes: Pupils are equal, round, and reactive to light.   Cardiovascular: Normal rate, regular rhythm, normal heart sounds and normal pulses.   No tachycardia or irregularity to pulses today   Pulmonary/Chest: Effort normal and breath sounds normal.   Abdominal: Soft. Bowel sounds are normal. She exhibits no distension. There is no abdominal tenderness.   Musculoskeletal: Tenderness present.      Comments: positive gianni finger test, TTP over b/l SIJ, b/l CARINA, and Gaenslens   Neurological: She is alert and oriented to person, place, and time. She has normal reflexes. She displays normal reflexes. No sensory deficit.   Psychiatric: Her behavior is normal. Thought content normal.         Assessment & Plan   Diagnoses and all orders for this visit:    1. Chronic midline low back pain, unspecified whether sciatica present (Primary)    2. Neck pain, chronic    3. Drug-induced constipation    4. Fibromyalgia    5. Lumbar radiculopathy    6. Other long term (current) drug therapy    7. Pain in joint involving multiple sites    8. Sacroiliitis, not elsewhere classified (HCC)    9. Chronic right shoulder pain        INspect reviewed, in order. Low risk. Repeat 7/14/22 in  order.  Increased to Lyrica 200mg TID, sedating, numbs her lips, but benefit outweighs side effects, not covered by insurance. Began Gabapentin 300mg TID, titrated, tolerating well, increased to 400mg TID, increasde to 400mg QID.  Cont Cymbalta 60mg qHS, helping, and sleeping better.  Stopped Norco 5/325mg, failed Percocet 5/325mg, started liquid Norco 7.5/325mg/15ml 15ml QID prn, #1800. Increased to q4h prn, #2700, no longer working. Increased to MS-IR 15mg q6h prn,then MS-Contin 30mg BID, was helping a lot more but tolerant. Rotate to Percocet 10mg QID prn.  Failed Mobic 7.5mg qd - BID prn, failed Celebrex.  Begin Silenor.  Cont Relistor 450mg qdaily, failed Miralax, colace.  Began RxAlt #2 compounded cream.  Ordered Flector BID prn.  New referral to Rheum.  Referral to PT for LBP and L hip pain.  Performed b/l SIJ injection. Repeat denied for no recent PT, but patient has failed PT in past.  Referral to Dr. Seiple for possible h/o TIAs.  Referral to Cardiology for unmanaged (supraventricular?) tachycardia.  RTC in 3 months for f/u.

## 2023-03-02 ENCOUNTER — TELEPHONE (OUTPATIENT)
Dept: PAIN MEDICINE | Facility: CLINIC | Age: 38
End: 2023-03-02

## 2023-03-02 NOTE — TELEPHONE ENCOUNTER
Please tell her to increase to q4h prn on her Percocet and to call before it runs out for an early refill, thanks.

## 2023-03-02 NOTE — TELEPHONE ENCOUNTER
Caller: BARBARA DE LEON    Relationship: SELF    Best call back number:     What was the call regarding: THE PATIENT STATED HER PAIN MEDICATION IS NOT LASTING LONG ENOUGH. AROUND THE 3 HOUR MARANDA HER PAIN STARTS TO INTENSIFY.   SHE WOULD LIKE TO KNOW WHAT ELSE CAN BE DONE/WHAT ELSE SHE CAN DO TO RELIEVE HER PAIN    Do you require a callback:YES

## 2023-03-02 NOTE — TELEPHONE ENCOUNTER
UNABLE TO WT CLINICAL    Caller: BARBARA DE LEON     Relationship to patient: PATIENT     Best call back number: [phone]    Patient is needing: PATIENT SAID SHE IS IN A TREMENDOUS AMOUNT OF PAIN FROM [employer] LEG SYNDROME  DR BRO PUT HER ON FAST ACTING OXYCODONE THAT SHE TAKES EVERY 6 HOURS AND IT'S NOT RELIEVING HER PAIN, THE PAIN RETURNS IN 3 HOURS.  SHE WAKES UP SCREAMING.  SHE GETS UP IN THE NIGHT AND TAKES HOT SHOWERS TO NO RELIEF.      PATIENT ASK FOR A RETURN CALL PLEASE SO SHE DOESN'T HAVE TO GO THROUGH ANOTHER NIGHT IN THIS TYPE OF PAIN AND SHE DOESN'T KNOW WHAT TO DO.

## 2023-03-08 DIAGNOSIS — G43.109 MIGRAINE EQUIVALENT SYNDROME: ICD-10-CM

## 2023-03-08 RX ORDER — SUMATRIPTAN 20 MG/1
SPRAY NASAL
Qty: 6 EACH | Refills: 3 | Status: SHIPPED | OUTPATIENT
Start: 2023-03-08

## 2023-03-17 ENCOUNTER — TELEPHONE (OUTPATIENT)
Dept: PAIN MEDICINE | Facility: CLINIC | Age: 38
End: 2023-03-17

## 2023-03-17 ENCOUNTER — TELEPHONE (OUTPATIENT)
Dept: NEUROLOGY | Facility: CLINIC | Age: 38
End: 2023-03-17

## 2023-03-17 DIAGNOSIS — M54.50 CHRONIC MIDLINE LOW BACK PAIN, UNSPECIFIED WHETHER SCIATICA PRESENT: ICD-10-CM

## 2023-03-17 DIAGNOSIS — G89.29 CHRONIC MIDLINE LOW BACK PAIN, UNSPECIFIED WHETHER SCIATICA PRESENT: ICD-10-CM

## 2023-03-17 RX ORDER — OXYCODONE AND ACETAMINOPHEN 10; 325 MG/1; MG/1
1 TABLET ORAL EVERY 4 HOURS PRN
Qty: 180 TABLET | Refills: 0 | Status: SHIPPED | OUTPATIENT
Start: 2023-03-17

## 2023-03-17 NOTE — TELEPHONE ENCOUNTER
PT RETURNING CALL TO R/S APPT ON 4/27/23 - UPSET, THIS IS SECOND R/S.    HUB HAS NO OPEN TILL 10/2023 - NO ACCEPTABLE TO PT    PLEASE ASSIST PT WITH R/S    THANK YOU

## 2023-03-17 NOTE — TELEPHONE ENCOUNTER
Caller: Alyson Lew    Relationship: Self    Best call back number:[phone]    Requested Prescriptions: OXYCODONE       Pharmacy where request should be sent:  [facility]     Does the patient have less than a 3 day supply:  [x] Yes  [] No    Would you like a call back once the refill request has been completed: [x] Yes [] No    If the office needs to give you a call back, can they leave a voicemail: [x] Yes [] No    Isabel King Rep   03/17/23 15:17 EDT

## 2023-03-17 NOTE — TELEPHONE ENCOUNTER
Patient still have 4 days supply. Sig was change by you on last phone note  and she was told to call before she runs out. Inspect on chart.

## 2023-03-26 ENCOUNTER — HOSPITAL ENCOUNTER (OUTPATIENT)
Facility: HOSPITAL | Age: 38
Discharge: HOME | End: 2023-03-26
Attending: EMERGENCY MEDICINE | Admitting: EMERGENCY MEDICINE
Payer: MEDICAID

## 2023-03-26 VITALS
TEMPERATURE: 97.8 F | OXYGEN SATURATION: 100 % | WEIGHT: 156 LBS | BODY MASS INDEX: 27.64 KG/M2 | DIASTOLIC BLOOD PRESSURE: 57 MMHG | SYSTOLIC BLOOD PRESSURE: 107 MMHG | HEART RATE: 77 BPM | RESPIRATION RATE: 16 BRPM | HEIGHT: 63 IN

## 2023-03-26 DIAGNOSIS — M67.431 GANGLION CYST OF DORSUM OF RIGHT WRIST: ICD-10-CM

## 2023-03-26 DIAGNOSIS — G89.29 OTHER CHRONIC PAIN: ICD-10-CM

## 2023-03-26 DIAGNOSIS — M25.531 ACUTE PAIN OF RIGHT WRIST: Primary | ICD-10-CM

## 2023-03-26 DIAGNOSIS — M79.7 FIBROMYALGIA AFFECTING FOREARM: ICD-10-CM

## 2023-03-26 PROCEDURE — 25010000002 KETOROLAC TROMETHAMINE PER 15 MG: Performed by: EMERGENCY MEDICINE

## 2023-03-26 PROCEDURE — G0463 HOSPITAL OUTPT CLINIC VISIT: HCPCS | Performed by: EMERGENCY MEDICINE

## 2023-03-26 RX ORDER — KETOROLAC TROMETHAMINE 30 MG/ML
30 INJECTION, SOLUTION INTRAMUSCULAR; INTRAVENOUS ONCE
Status: COMPLETED | OUTPATIENT
Start: 2023-03-26 | End: 2023-03-26

## 2023-03-26 RX ADMIN — KETOROLAC TROMETHAMINE 30 MG: 30 INJECTION, SOLUTION INTRAMUSCULAR at 13:41

## 2023-03-26 NOTE — FSED PROVIDER NOTE
Subjective   History of Present Illness  The patient is a 37-year-old with multiple issues regarding chronic pain including peripheral neuropathy fibromyalgia and history of a ganglion cyst excision who complains of several days of right wrist pain without history of trauma.  No signs of infection no fever chills or redness.  The patient is under chronic pain management.        Review of Systems   Constitutional: Negative.    HENT: Negative.  Negative for rhinorrhea and sore throat.    Eyes: Negative.    Respiratory: Negative.  Negative for chest tightness and shortness of breath.    Cardiovascular: Negative.    Gastrointestinal: Negative.  Negative for abdominal pain, diarrhea, nausea and vomiting.   Endocrine: Negative.    Genitourinary: Negative.  Negative for dysuria.   Musculoskeletal: Negative for back pain.   Skin: Negative.    Allergic/Immunologic: Negative.    Neurological: Negative.    Hematological: Negative.    Psychiatric/Behavioral: Negative.    All other systems reviewed and are negative.      Past Medical History:   Diagnosis Date   • Allergic    • Anemia    • Anxiety    • Arthritis    • Asthma    • Cancer ([facility])     cervical 2008   • Chronic constipation    • Chronic pain disorder    • Depression    • Disease of thyroid gland    • Extremity pain    • Fibromyalgia, primary    • Headache    • Hx of migraines    • Injury of back    • Joint pain    • Low back pain    • Migraine 2021    Hemiplegic migraines   • Neck pain    • Peripheral neuropathy    • PTSD (post-traumatic stress disorder)    • Rheumatoid arthritis ([facility]) 2017   • Shortness of breath    • Stroke ([facility])     Mini heat stroke   • Tachycardia        Allergies   Allergen Reactions   • Bleach [Sodium Hypochlorite] Anaphylaxis   • Adhesive Tape Hives   • Latex Hives and Unknown - High Severity       Past Surgical History:   Procedure Laterality Date   • CARDIAC ABLATION  02/11/2020   • CARDIAC CATHETERIZATION N/A 11/05/2019    Procedure: Coronary  angiography;  Surgeon: Leo Fagan MD;  Location: [facility] CATH INVASIVE LOCATION;  Service: Cardiovascular   • CARDIAC CATHETERIZATION Left 2019    Procedure: Cardiac Catheterization/Vascular Study;  Surgeon: Leo Fagan MD;  Location: [facility] CATH INVASIVE LOCATION;  Service: Cardiovascular   • CARDIAC CATHETERIZATION N/A 2019    Procedure: Left ventriculography;  Surgeon: Leo Fagan MD;  Location: [facility] CATH INVASIVE LOCATION;  Service: Cardiovascular   • CARDIAC ELECTROPHYSIOLOGY PROCEDURE N/A 2020    Procedure: EP/Ablation;  Surgeon: Luther Walden MD;  Location: [facility] CATH INVASIVE LOCATION;  Service: Cardiovascular;  Laterality: N/A;   •  SECTION      x2   • CHOLECYSTECTOMY     • ENDOSCOPY     • GASTRIC SLEEVE LAPAROSCOPIC     • HAND SURGERY     • PERONEAL TENDON EXPLORATION Right 2020    Procedure: EXTENSOR HALLICUS LONGUS REPAIR AND NERVE DECOMPRESSION;  Surgeon: VALERIA Rajan DPM;  Location: [facility] MAIN OR;  Service: Podiatry;  Laterality: Right;   • TRIGGER POINT INJECTION      Was done by Dr Rivero   • TUBAL ABDOMINAL LIGATION         Family History   Problem Relation Age of Onset   • No Known Problems Mother    • Cancer Father    • Coronary artery disease Father    • Diabetes Father    • Stroke Father    • Heart attack Father    • Heart disease Father    • Transient ischemic attack Father    • Migraines Father    • Mental illness Brother    • Mental illness Son    • Migraines Maternal Aunt    • Seizures Maternal Aunt    • Stroke Maternal Aunt    • Migraines Paternal Grandmother        Social History     Socioeconomic History   • Marital status: Significant Other   Tobacco Use   • Smoking status: Never     Passive exposure: Yes   • Smokeless tobacco: Never   • Tobacco comments:     Went to a few puffs a day after fathers pasing   Vaping Use   • Vaping Use: Every day   • Substances: Nicotine, Flavoring   • Devices: Pre-filled or refillable cartridge    Substance and Sexual Activity   • Alcohol use: No   • Drug use: No   • Sexual activity: Yes     Partners: Male     Birth control/protection: Tubal ligation     Comment: Been with Carmen for 8 yrs           Objective   Physical Exam  Vitals and nursing note reviewed.   Constitutional:       Appearance: She is well-developed and normal weight.   HENT:      Head: Normocephalic and atraumatic.      Right Ear: External ear normal.      Left Ear: External ear normal.      Nose: Nose normal.      Mouth/Throat:      Mouth: Mucous membranes are moist.      Pharynx: Oropharynx is clear.   Eyes:      Extraocular Movements: Extraocular movements intact.      Pupils: Pupils are equal, round, and reactive to light.   Cardiovascular:      Rate and Rhythm: Normal rate and regular rhythm.      Pulses: Normal pulses.      Heart sounds: Normal heart sounds.   Pulmonary:      Effort: Pulmonary effort is normal.      Breath sounds: Normal breath sounds.   Abdominal:      General: Abdomen is flat.      Palpations: Abdomen is soft.      Tenderness: There is no abdominal tenderness.   Musculoskeletal:         General: Tenderness present. No swelling, deformity or signs of injury. Normal range of motion.      Cervical back: Normal range of motion and neck supple.      Right lower leg: No edema.      Left lower leg: No edema.      Comments: Right wrist hurts to touch lightly.  There is no.  There is an old incision scar from a ganglion cyst removal.  There is no signs of erythema.  No deformity.  Good pulses.   Skin:     General: Skin is warm and dry.   Neurological:      General: No focal deficit present.      Mental Status: She is alert and oriented to person, place, and time. Mental status is at baseline.   Psychiatric:         Mood and Affect: Mood normal.         Behavior: Behavior normal.         Thought Content: Thought content normal.         Judgment: Judgment normal.         Procedures           ED Course                                            Medical Decision Making  Patient presents with right wrist pain most likely from fibromyalgia and other chronic pain syndrome.  There is no signs of infection fracture or injury.  The patient will be placed in a Velcro wrist splint    Velcro wrist splint was placed in typical fashion for this.  Preformed splint was placed by the nursing staff no neurovascular compromise pre or post splint application.    The patient was given Toradol.  The patient is stable for release.  The patient is under chronic pain management.  She will not be prescribed narcotics.    She was encouraged to follow-up with her specialists or hand surgeon of her choosing.    Acute pain of right wrist: acute illness or injury  Fibromyalgia affecting forearm: acute illness or injury  Ganglion cyst of dorsum of right wrist: acute illness or injury  Other chronic pain: acute illness or injury      Final diagnoses:   Acute pain of right wrist   Fibromyalgia affecting forearm   Other chronic pain   Ganglion cyst of dorsum of right wrist       ED Disposition  ED Disposition     ED Disposition   Discharge    Condition   Stable    Comment   --             PATIENT CONNECTION - [facility] [address] [phone]  Call       Michelle Narayanan MD  [address] [place] [address] [address]  [place] [address] [phone]    Call in 1 day  If symptoms worsen         Medication List      No changes were made to your prescriptions during this visit.

## 2023-03-26 NOTE — DISCHARGE INSTRUCTIONS
Follow-up with hand surgeon/orthopedist of your choice.    Consider going to [facility]

## 2023-03-29 ENCOUNTER — TELEPHONE (OUTPATIENT)
Dept: FAMILY MEDICINE CLINIC | Facility: CLINIC | Age: 38
End: 2023-03-29
Payer: MEDICAID

## 2023-03-29 DIAGNOSIS — M67.431 GANGLION CYST OF WRIST, RIGHT: ICD-10-CM

## 2023-03-29 DIAGNOSIS — M25.531 RIGHT WRIST PAIN: Primary | ICD-10-CM

## 2023-03-29 NOTE — TELEPHONE ENCOUNTER
She had a cyst taken out of her wrist in 2015 by Dr. Zhu. She went to [facility] ER on 03/26/23 and it is back.  Can you put in a new referral for her? She has their phone number to call them to get scheduled.

## 2023-04-14 ENCOUNTER — OFFICE VISIT (OUTPATIENT)
Dept: ORTHOPEDIC SURGERY | Facility: CLINIC | Age: 38
End: 2023-04-14
Payer: MEDICAID

## 2023-04-14 VITALS — BODY MASS INDEX: 27.64 KG/M2 | OXYGEN SATURATION: 100 % | HEART RATE: 62 BPM | WEIGHT: 156 LBS | HEIGHT: 63 IN

## 2023-04-14 DIAGNOSIS — M25.531 RIGHT WRIST PAIN: Primary | ICD-10-CM

## 2023-04-14 DIAGNOSIS — G56.01 CARPAL TUNNEL SYNDROME OF RIGHT WRIST: ICD-10-CM

## 2023-04-14 RX ADMIN — TRIAMCINOLONE ACETONIDE 40 MG: 40 INJECTION, SUSPENSION INTRA-ARTICULAR; INTRAMUSCULAR at 12:59

## 2023-04-14 NOTE — PROGRESS NOTES
Primary Care Sports Medicine Office Visit Note     Patient ID: Alyson Lew is a 38 y.o. female.    Chief Complaint:  Chief Complaint   Patient presents with   • Right Wrist - Pain, Initial Evaluation     HPI:    Ms. Alyson Lew is a 38 y.o. female. The patient presents to the clinic today for right wrist pain.    The patient complains of dull, throbbing pain in her right wrist that radiates up into her upper extremity. She is unable to move her right wrist or do anything due to the pain. She is unable to sleep due to the pain. She experiences paresthesia in her right first three digits. She denies any edema in her right wrist. She went to [facility] and received a Toradol injection, which provided relief for approximately 1.5 days. She has 3 children and responsibilities, and she cannot do anything. She cannot even lift a phone without experiencing pain. She has tried applying heat, which temporarily soothes the pain. She has tried ice, which did not improve her pain. She does a significant amount of typing. She has a history of a cyst in her right wrist that had to be surgically removed.    The patient has fibromyalgia and rheumatoid arthritis. She was prescribed Percocet every 4 hours by Dr. Rivero for fibromyalgia, and she has an appointment with him on 04/27/2023 for a longer-lasting pain management. She has tried gabapentin and Lyrica in the past. She wakes up screaming in pain.    Past Medical History:   Diagnosis Date   • Allergic    • Anemia    • Anxiety    • Arthritis    • Asthma    • Cancer     cervical 2008   • Chronic constipation    • Chronic pain disorder    • Depression    • Disease of thyroid gland    • Extremity pain    • Fibromyalgia, primary    • Headache    • Hx of migraines    • Injury of back    • Joint pain    • Low back pain    • Migraine 2021    Hemiplegic migraines   • Neck pain    • Peripheral neuropathy    • PTSD (post-traumatic stress disorder)    • Rheumatoid arthritis  2017   • Shortness of breath    • Stroke     Mini heat stroke   • Tachycardia        Past Surgical History:   Procedure Laterality Date   • CARDIAC ABLATION  2020   • CARDIAC CATHETERIZATION N/A 2019    Procedure: Coronary angiography;  Surgeon: Leo Fagan MD;  Location: [facility] CATH INVASIVE LOCATION;  Service: Cardiovascular   • CARDIAC CATHETERIZATION Left 2019    Procedure: Cardiac Catheterization/Vascular Study;  Surgeon: Leo Fagan MD;  Location: [facility] CATH INVASIVE LOCATION;  Service: Cardiovascular   • CARDIAC CATHETERIZATION N/A 2019    Procedure: Left ventriculography;  Surgeon: Leo Fagan MD;  Location: [facility] CATH INVASIVE LOCATION;  Service: Cardiovascular   • CARDIAC ELECTROPHYSIOLOGY PROCEDURE N/A 2020    Procedure: EP/Ablation;  Surgeon: Luther Walden MD;  Location: [facility] CATH INVASIVE LOCATION;  Service: Cardiovascular;  Laterality: N/A;   •  SECTION      x2   • CHOLECYSTECTOMY     • ENDOSCOPY     • GASTRIC SLEEVE LAPAROSCOPIC     • HAND SURGERY     • PERONEAL TENDON EXPLORATION Right 2020    Procedure: EXTENSOR HALLICUS LONGUS REPAIR AND NERVE DECOMPRESSION;  Surgeon: VALERIA Rajan DPM;  Location: [facility] MAIN OR;  Service: Podiatry;  Laterality: Right;   • TRIGGER POINT INJECTION      Was done by Dr Rivero   • TUBAL ABDOMINAL LIGATION         Family History   Problem Relation Age of Onset   • No Known Problems Mother    • Cancer Father    • Coronary artery disease Father    • Diabetes Father    • Stroke Father    • Heart attack Father    • Heart disease Father    • Transient ischemic attack Father    • Migraines Father    • Mental illness Brother    • Mental illness Son    • Migraines Maternal Aunt    • Seizures Maternal Aunt    • Stroke Maternal Aunt    • Migraines Paternal Grandmother      Social History     Occupational History   • Not on file   Tobacco Use   • Smoking status: Never     Passive exposure: Yes   • Smokeless tobacco:  "Never   • Tobacco comments:     Went to a few puffs a day after fathers pasing   Vaping Use   • Vaping Use: Every day   • Substances: Nicotine, Flavoring   • Devices: Pre-filled or refillable cartridge   Substance and Sexual Activity   • Alcohol use: No   • Drug use: No   • Sexual activity: Yes     Partners: Male     Birth control/protection: Tubal ligation     Comment: Been with Carmen for 8 yrs      Review of Systems   Constitutional: Negative for activity change and fever.   Musculoskeletal: Positive for arthralgias.   Skin: Negative for color change and rash.   Neurological: Positive for numbness. Negative for weakness.     Objective:    Pulse 62   Ht 160 cm (63\")   Wt 70.8 kg (156 lb)   SpO2 100%   BMI 27.63 kg/m²     Physical Examination:  Physical Exam  Vitals and nursing note reviewed.   Constitutional:       General: She is not in acute distress.     Appearance: She is well-developed. She is not diaphoretic.   HENT:      Head: Normocephalic and atraumatic.   Eyes:      Conjunctiva/sclera: Conjunctivae normal.   Pulmonary:      Effort: Pulmonary effort is normal. No respiratory distress.   Skin:     General: Skin is warm.      Capillary Refill: Capillary refill takes less than 2 seconds.   Neurological:      Mental Status: She is alert.       Left Hand Exam     Range of Motion   The patient has normal left wrist ROM.  Wrist   Extension: normal   Flexion: normal   Pronation: normal   Supination: normal     Muscle Strength   Wrist extension: 5/5   Wrist flexion: 5/5     Comments:  Right wrist examination reveals tenderness to palpation of the soft tissues out of proportion to examination. There is full range of motion though painful to the wrist to flexion, extension, ulnar deviation, radial deviation, pronation, and supination. All of these motions exhibit 5/5 strength though tender. Tinel sign is exquisitely tender. TFCC grind test and [facility] grind test are negative, however.        Imaging and other " tests:  Three-view XR of the right wrist today yields no acute bony abnormality.    Assessment and Plan:    1. Right wrist pain  - XR Wrist 3+ View Right    2. Carpal tunnel    3. Fibromyalgia    4. Rheumatoid arthritis    I discussed pathology and potential treatment options with the patient today. Though she does have fibromyalgia, she seems to have a moderately irritated median nerve with correlative numbness in the first three digits. Diagnostically and symptomatic treatment, we discussed carpal tunnel injection. The patient elected to proceed with this, and did well today without complication. Return to clinic in 2 to 3 months for follow-up evaluation and further treatment options at that time.    Transcribed from ambient dictation for Markell Segundo II,  by Christiano Horn.  04/14/23   13:57 EDT    Patient or patient representative verbalized consent to the visit recording.  I have personally performed the services described in this document as transcribed by the above individual, and it is both accurate and complete.    Disclaimer: Please note that areas of this note were completed with computer voice recognition software.  Quite often unanticipated grammatical, syntax, homophones, and other interpretive errors are inadvertently transcribed by the computer software. Please excuse any errors that have escaped final proofreading.

## 2023-04-18 RX ORDER — TRIAMCINOLONE ACETONIDE 40 MG/ML
40 INJECTION, SUSPENSION INTRA-ARTICULAR; INTRAMUSCULAR
Status: COMPLETED | OUTPATIENT
Start: 2023-04-14 | End: 2023-04-14

## 2023-04-18 NOTE — PROGRESS NOTES
Procedure   - Injection Tendon or Ligament    Date/Time: 4/14/2023 12:59 PM  Performed by: Markell Segundo II, DO  Authorized by: Markell Segundo II, DO   Preparation: Patient was prepped and draped in the usual sterile fashion.  Local anesthesia used: no    Anesthesia:  Local anesthesia used: no    Sedation:  Patient sedated: no    Comments: I discussed the risks and benefits of the right volar wrist was prepped and draped in usual sterile fashion.  Alcohol and Betadine was used to cleanse the skin.  Using a lateral to medial approach, the carpal tunnel was infiltrated with a 27-gauge needle.  1 cc of 40 mg Kenalog and 1 cc of 2% lidocaine was then injected.  The needle was removed without complication.  Blood loss negligible.  The patient tolerated the procedure well.  Medications administered: 40 mg triamcinolone acetonide 40 MG/ML

## 2023-04-20 ENCOUNTER — TELEPHONE (OUTPATIENT)
Dept: PAIN MEDICINE | Facility: CLINIC | Age: 38
End: 2023-04-20

## 2023-04-20 NOTE — TELEPHONE ENCOUNTER
Pharmacy only fill 120  Oxycodone.  Patient is going to talk to you about  long acting on her next appt.

## 2023-04-20 NOTE — TELEPHONE ENCOUNTER
Caller: Alyson Lew    Relationship to patient: Self    Best call back number: [phone]  Patient is needing: PHARMACY WILL NOT FILL RX  PILLS. NEW RX NEEDS TO BE SENT IN FOR SMALLER AMOUNT. HAS APPT 4.27.23 BUT WILL RUN OUT OF MEDS BEFORE THEN HAS LESS THEN 2 DAYS LEFT.

## 2023-04-21 DIAGNOSIS — G89.29 CHRONIC MIDLINE LOW BACK PAIN, UNSPECIFIED WHETHER SCIATICA PRESENT: ICD-10-CM

## 2023-04-21 DIAGNOSIS — M54.50 CHRONIC MIDLINE LOW BACK PAIN, UNSPECIFIED WHETHER SCIATICA PRESENT: ICD-10-CM

## 2023-04-21 RX ORDER — OXYCODONE AND ACETAMINOPHEN 10; 325 MG/1; MG/1
1 TABLET ORAL EVERY 6 HOURS PRN
Qty: 120 TABLET | Refills: 0 | Status: SHIPPED | OUTPATIENT
Start: 2023-04-21

## 2023-04-21 NOTE — TELEPHONE ENCOUNTER
Caller: lAyson Lew    Relationship to patient: Self    Best call back number:     Patient is needing: UNABLE TO WARM TRANSFER.  PATIENT STATES HER PHARMACY SAID THEY DONT SEE THE PRESCRIPTION.

## 2023-04-26 NOTE — TELEPHONE ENCOUNTER
"  [address] [place] [address]  Phone: [phone]   [phone]    Patient's PCP:Roland Barlow NP  Referring Provider: Aaareferral Self    Subjective:      Chief Complaint:: Ingrown Toenail (Right great toe lateral border/)    SHENA Osborne is a 24 y.o. female who presents today with a complaint of right great toe lateral border base lasting for four days. Onset of symptoms inflammation and reports no trauma.  Current symptoms include pain, inflammation.  Aggravating factors are close toe shoe. Symptoms have remained. Treatment to date have included attempted home removal.    Vitals:    04/26/23 1346   Resp: 18   Weight: 73 kg (161 lb)   Height: 5' 1" (1.549 m)   PainSc:   2      Shoe Size: 7-7.5    Past Surgical History:   Procedure Laterality Date    ADENOIDECTOMY  at 2 YO    PET's w/ T&A.    TONSILLECTOMY  at 2 YO    PET's w/ T&A.    TYMPANOSTOMY TUBE PLACEMENT  at 2 YO    PET's w/ T&A.     Past Medical History:   Diagnosis Date    Anxiety     Otitis media     Last -- 1/26/11    Seasonal allergic rhinitis     Sinusitis acute     Last 2x -- 12/29/11, 4/4/11     Family History   Problem Relation Age of Onset    No Known Problems Mother     No Known Problems Father     Restless legs syndrome Brother         Social History:   Marital Status: Single  Alcohol History:  reports current alcohol use.  Tobacco History:  reports that she has never smoked. She has never used smokeless tobacco.  Drug History:  reports no history of drug use.    Review of patient's allergies indicates:   Allergen Reactions    No known drug allergies        Current Outpatient Medications   Medication Sig Dispense Refill    LO LOESTRIN FE 1 mg-10 mcg (24)/10 mcg (2) Tab Take 1 tablet by mouth once daily.  4    AKLIEF 0.005 % Crea       citalopram (CELEXA) 10 MG tablet Take 1 tablet (10 mg total) by mouth once daily. (Patient not taking: Reported on 4/26/2023) 90 tablet 1    hydrOXYzine pamoate (VISTARIL) 25 " Patient notified.   MG Cap Take 1 capsule (25 mg total) by mouth nightly as needed. (Patient not taking: Reported on 4/26/2023) 90 capsule 0    sumatriptan (IMITREX) 100 MG tablet TAKE 1 TABLET AT ONSET OF HEADACHE,REPEAT AFTER 2 HOURS IF NEEDED,NO MORE THAN 2/DAY      topiramate (TOPAMAX) 25 MG tablet 50 mg 2 (two) times a day.       No current facility-administered medications for this visit.       Review of Systems   Constitutional:  Negative for chills, fatigue, fever and unexpected weight change.   HENT:  Negative for hearing loss and trouble swallowing.    Eyes:  Negative for photophobia and visual disturbance.   Respiratory:  Negative for cough, shortness of breath and wheezing.    Cardiovascular:  Negative for chest pain, palpitations and leg swelling.   Gastrointestinal:  Negative for abdominal pain and nausea.   Genitourinary:  Negative for dysuria and frequency.   Musculoskeletal:  Negative for arthralgias, back pain, gait problem, joint swelling and myalgias.   Skin:  Positive for color change. Negative for rash and wound.   Neurological:  Negative for tremors, seizures, weakness, numbness and headaches.   Hematological:  Does not bruise/bleed easily.       Objective:        Physical Exam:   Foot Exam    General  General Appearance: appears stated age and healthy   Orientation: alert and oriented to person, place, and time   Affect: appropriate   Gait: unimpaired       Right Foot/Ankle     Inspection and Palpation  Ecchymosis: none  Tenderness: (Lateral border great toenail)  Swelling: (Lateral border great toenail)  Arch: normal  Skin Exam: erythema; no drainage and no ulcer   Neurovascular  Dorsalis pedis: 2+  Posterior tibial: 2+  Capillary Refill: 2+  Varicose veins: not present  Saphenous nerve sensation: normal  Tibial nerve sensation: normal  Superficial peroneal nerve sensation: normal  Deep peroneal nerve sensation: normal  Sural nerve sensation: normal    Edema  Type of edema: non-pitting    Muscle Strength  Ankle  dorsiflexion: 5  Ankle plantar flexion: 5  Ankle inversion: 5  Ankle eversion: 5  Great toe extension: 5  Great toe flexion: 5    Range of Motion    Normal right ankle ROM    Tests  Anterior drawer: negative   Talar tilt: negative   PT Tinel's sign: negative    Paresthesia: negative  Comments  Ingrown lateral border great toenail.  Tender to palpation.  Mild edema and erythema present.  No purulence.      Physical Exam  Cardiovascular:      Pulses:           Dorsalis pedis pulses are 2+ on the right side.        Posterior tibial pulses are 2+ on the right side.   Feet:      Right foot:      Skin integrity: Erythema present. No ulcer.             Right Ankle/Foot Exam     Range of Motion   The patient has normal right ankle ROM.    Comments:  Ingrown lateral border great toenail.  Tender to palpation.  Mild edema and erythema present.  No purulence.        Muscle Strength   Right Lower Extremity   Ankle Dorsiflexion:  5   Plantar flexion:  5/5    Vascular Exam     Right Pulses  Dorsalis Pedis:      2+  Posterior Tibial:      2+         Imaging: none            Assessment:       1. Ingrown nail    2. Pain of toe of right foot      Plan:   Ingrown nail  -     Nail Removal    Pain of toe of right foot  -     Nail Removal      Follow up if symptoms worsen or fail to improve.    Nail Removal    Date/Time: 4/26/2023 2:00 PM  Performed by: Mateo Martin DPM  Authorized by: Mateo Martin DPM     Consent Done?:  Yes (Written)  Time out: Immediately prior to the procedure a time out was called    Prep: patient was prepped and draped in usual sterile fashion    Location:     Location:  Right foot    Location detail:  Right big toe  Anesthesia:     Anesthesia:  Local infiltration    Local anesthetic:  Lidocaine 1% without epinephrine  Procedure Details:     Preparation:  Skin prepped with alcohol    Amount removed:  Partial    Side:  Lateral    Wedge excision of skin of nail fold: No      Nail bed sutured?: No      Nail matrix  removed:  Partial    Removal method:  Phenol and alcohol    Dressing applied:  4x4    Patient tolerance:  Patient tolerated the procedure well with no immediate complications     Lateral border           Counseling:     I provided patient education verbally regarding:   Patient diagnosis, treatment options, as well as alternatives, risks, and benefits.     Ingrown toenail treatment options of no treatment, avulsion of nail border under local with regrowth of nail, chemical matrixectomy for attempted permanent correction of the problem. Patient was educated about daily dressing changes, soaks, and medications following removal of the nail.       This note was created using Dragon voice recognition software that occasionally misinterpreted phrases or words.

## 2023-04-27 ENCOUNTER — TELEPHONE (OUTPATIENT)
Dept: ORTHOPEDIC SURGERY | Facility: CLINIC | Age: 38
End: 2023-04-27
Payer: MEDICAID

## 2023-04-27 ENCOUNTER — OFFICE VISIT (OUTPATIENT)
Dept: PAIN MEDICINE | Facility: CLINIC | Age: 38
End: 2023-04-27
Payer: MEDICAID

## 2023-04-27 VITALS
RESPIRATION RATE: 16 BRPM | SYSTOLIC BLOOD PRESSURE: 106 MMHG | DIASTOLIC BLOOD PRESSURE: 59 MMHG | OXYGEN SATURATION: 100 % | HEART RATE: 71 BPM

## 2023-04-27 DIAGNOSIS — M46.1 SACROILIITIS, NOT ELSEWHERE CLASSIFIED: ICD-10-CM

## 2023-04-27 DIAGNOSIS — M25.50 PAIN IN JOINT INVOLVING MULTIPLE SITES: ICD-10-CM

## 2023-04-27 DIAGNOSIS — M79.601 PAIN IN RIGHT ARM: ICD-10-CM

## 2023-04-27 DIAGNOSIS — M25.511 CHRONIC RIGHT SHOULDER PAIN: ICD-10-CM

## 2023-04-27 DIAGNOSIS — M54.16 LUMBAR RADICULOPATHY: ICD-10-CM

## 2023-04-27 DIAGNOSIS — M54.50 CHRONIC MIDLINE LOW BACK PAIN, UNSPECIFIED WHETHER SCIATICA PRESENT: Primary | ICD-10-CM

## 2023-04-27 DIAGNOSIS — Z79.899 OTHER LONG TERM (CURRENT) DRUG THERAPY: ICD-10-CM

## 2023-04-27 DIAGNOSIS — G89.29 CHRONIC RIGHT SHOULDER PAIN: ICD-10-CM

## 2023-04-27 DIAGNOSIS — K59.03 DRUG-INDUCED CONSTIPATION: ICD-10-CM

## 2023-04-27 DIAGNOSIS — M79.7 FIBROMYALGIA: ICD-10-CM

## 2023-04-27 DIAGNOSIS — G89.29 CHRONIC MIDLINE LOW BACK PAIN, UNSPECIFIED WHETHER SCIATICA PRESENT: Primary | ICD-10-CM

## 2023-04-27 DIAGNOSIS — G89.29 NECK PAIN, CHRONIC: ICD-10-CM

## 2023-04-27 DIAGNOSIS — M54.2 NECK PAIN, CHRONIC: ICD-10-CM

## 2023-04-27 RX ORDER — GABAPENTIN 400 MG/1
400 CAPSULE ORAL 4 TIMES DAILY
Qty: 120 CAPSULE | Refills: 5 | Status: SHIPPED | OUTPATIENT
Start: 2023-04-27

## 2023-04-27 RX ORDER — OXYCODONE 27 MG/1
1 CAPSULE, EXTENDED RELEASE ORAL EVERY 12 HOURS
Qty: 60 EACH | Refills: 0 | Status: SHIPPED | OUTPATIENT
Start: 2023-04-27

## 2023-04-27 NOTE — TELEPHONE ENCOUNTER
Called and spoke to patient and made an appointment for her to come in for a follow up on right wrist 5/3 @ 9:30.

## 2023-04-27 NOTE — PROGRESS NOTES
"Subjective   Alyson Lew is a 38 y.o. female.     History of Present Illness  all over pain, especially back, neck and bilateral hip pain, right knee. Dz with fibromyalgia and inflammatory arthritis by Rheum, on DMARDs and Lyrica 100mg BID at initial visit with poor control, pain worsening, 10/10 at worst, 7/10 at best, always present, prevents work and housework, burning, sharp, varies. Prior notes reviewed, referred for worsening pain, does not have time for PT. Increased to Lyrica 100mg BID then TID, started Celebrex, Cymbalta 30mg then 60mg qHS, fewer flares but still severe fibromyalgia pain, also LBP and b/l hip pain. Started Norco 5/325mg, mostly taking qHS, some days BID with worsening pain with cold weather, becoming less effective, rotated to Percocet 5/325mg. C/o insomnia. Went to ED with severe pain not controlled with Percocet 5/325mg BID prn, now QID prn. Had gastric sleeve surgery, liquid Norco worked well, restarted Percocet with severe N/V, vomited up complete prescription. Started liquid Norco, working better, but pain worsening, having difficulty with ADLs, increased Norco and Lyrica, then MS-IR 15mg QID prn with pill . Worsening L \"hip\" pain, insomnia, constipation. Requesting NSAID. Had toni. Has FH of autoimmune disorders and MS, hasn't heard from Rheum. Needs PT before having injection, worsening SIJ pain even with PT, had b/l SIJ injections, helped, would like to repeat.    Also reports \"episodes\" where she loses the ability to speak c/w Broca's aphasia, resolves over 3-4 days spontaneously, had CT-brain in ED which was negative. Has h/o tachycardia, failed ablation, is not currently medicated.   Back Pain  Pertinent negatives include no abdominal pain, bladder incontinence, chest pain, fever, numbness or weakness.        The following portions of the patient's history were reviewed and updated as appropriate: allergies, current medications, past family history, past medical " history, past social history, past surgical history and problem list.    Review of Systems   Constitutional: Negative for chills, fatigue and fever.   HENT: Negative for hearing loss and trouble swallowing.    Eyes: Positive for visual disturbance.   Respiratory: Positive for shortness of breath.    Cardiovascular: Negative for chest pain.   Gastrointestinal: Positive for constipation. Negative for abdominal pain, diarrhea, nausea and vomiting.   Genitourinary: Negative for urinary incontinence.   Musculoskeletal: Positive for arthralgias, back pain and neck pain. Negative for joint swelling and myalgias.   Neurological: Positive for headache. Negative for dizziness, weakness and numbness.       Objective   Physical Exam   Constitutional: She is oriented to person, place, and time. She appears well-developed and well-nourished.   HENT:   Head: Normocephalic and atraumatic.   Eyes: Pupils are equal, round, and reactive to light.   Cardiovascular: Normal rate, regular rhythm, normal heart sounds and normal pulses.   No tachycardia or irregularity to pulses today   Pulmonary/Chest: Effort normal and breath sounds normal.   Abdominal: Soft. Bowel sounds are normal. She exhibits no distension. There is no abdominal tenderness.   Musculoskeletal: Tenderness present.      Comments: positive gianni finger test, TTP over b/l SIJ, b/l CARINA, and Gaenslens   Neurological: She is alert and oriented to person, place, and time. She has normal reflexes. She displays normal reflexes. No sensory deficit.   Psychiatric: Her behavior is normal. Thought content normal.         Assessment & Plan   Diagnoses and all orders for this visit:    1. Chronic midline low back pain, unspecified whether sciatica present (Primary)    2. Lumbar radiculopathy    3. Neck pain, chronic    4. Drug-induced constipation    5. Fibromyalgia    6. Other long term (current) drug therapy    7. Pain in joint involving multiple sites    8. Pain in right  arm    9. Sacroiliitis, not elsewhere classified    10. Chronic right shoulder pain        INspect reviewed, in order. Low risk. Repeat 7/14/22 in order.  Increased to Lyrica 200mg TID, sedating, numbs her lips, but benefit outweighs side effects, not covered by insurance. Began Gabapentin 300mg TID, titrated, tolerating well, increased to 400mg TID, increasde to 400mg QID.  Cont Cymbalta 60mg qHS, helping, and sleeping better.  Stopped Norco 5/325mg, failed Percocet 5/325mg, started liquid Norco 7.5/325mg/15ml 15ml QID prn, #1800. Increased to q4h prn, #2700, no longer working. Increased to MS-IR 15mg q6h prn,then MS-Contin 30mg BID, was helping a lot more but tolerant. Rotated to Percocet 10mg QID prn, then q4h prn. Use new refill at q4h then rotate to Xtampza 27mg BID.  Failed Mobic 7.5mg qd - BID prn, failed Celebrex.  Begin Silenor.  Cont Relistor 450mg qdaily, failed Miralax, colace.  Began RxAlt #2 compounded cream.  Ordered Flector BID prn.  New referral to Rheum.  Referral to PT for LBP and L hip pain.  Performed b/l SIJ injection. Repeat denied for no recent PT, but patient has failed PT in past.  Referral to Dr. Seiple for possible h/o TIAs.  Referral to Cardiology for unmanaged (supraventricular?) tachycardia.  Had R CTS injection with Dr. Segundo, still painful, likely needs surgical release.  RTC in 3 months for f/u.                        Physical Exam  Constitutional:       Appearance: She is well-developed and well-nourished.   HENT:      Head: Normocephalic and atraumatic.   Eyes:      Pupils: Pupils are equal, round, and reactive to light.   Cardiovascular:      Rate and Rhythm: Normal rate and regular rhythm.      Pulses: Normal pulses.      Heart sounds: Normal heart sounds.      Comments: No tachycardia or irregularity to pulses today  Pulmonary:      Effort: Pulmonary effort is normal.      Breath sounds: Normal breath sounds.   Abdominal:      General: Bowel sounds are normal. There is no  distension.      Palpations: Abdomen is soft.      Tenderness: There is no abdominal tenderness.   Musculoskeletal:         General: Tenderness present.      Comments: positive gianni finger test, TTP over b/l SIJ, b/l CARINA, and Gaenslens   Neurological:      Mental Status: She is alert and oriented to person, place, and time.      Sensory: No sensory deficit.      Deep Tendon Reflexes: Reflexes are normal and symmetric. Reflexes normal.   Psychiatric:         Behavior: Behavior normal.         Thought Content: Thought content normal.

## 2023-04-27 NOTE — TELEPHONE ENCOUNTER
Alyson Castano stopped by while she was at Doctor Rivero's office and upon the advise of Doctor Rivero the shot she is getting is not helping the pain, she wakes up with  Pain in her wrist and arm, the pain is amping up before it is  time for a shot. [phone]  PLEASE ADVISE

## 2023-04-28 ENCOUNTER — TELEPHONE (OUTPATIENT)
Dept: ORTHOPEDIC SURGERY | Facility: CLINIC | Age: 38
End: 2023-04-28

## 2023-04-28 NOTE — TELEPHONE ENCOUNTER
Caller: BARBARA    Relationship to patient: SELF    Best call back number: [phone]    Chief complaint: WRIST PAIN    Type of visit: NEW PROBLEM     Requested date: ASAP     If rescheduling, when is the original appointment: 5.3.23     Additional notes: PATIENT STATES HER PAIN IS SEVERE AND KEEPING HER UP AT NIGHT AND WOULD LIKE TO BE SEEN SOONER.

## 2023-05-01 ENCOUNTER — OFFICE VISIT (OUTPATIENT)
Dept: ORTHOPEDIC SURGERY | Facility: CLINIC | Age: 38
End: 2023-05-01
Payer: MEDICAID

## 2023-05-01 VITALS — OXYGEN SATURATION: 98 % | WEIGHT: 156 LBS | BODY MASS INDEX: 27.64 KG/M2 | HEART RATE: 75 BPM | HEIGHT: 63 IN

## 2023-05-01 DIAGNOSIS — G56.01 CARPAL TUNNEL SYNDROME OF RIGHT WRIST: Primary | ICD-10-CM

## 2023-05-01 PROCEDURE — 99213 OFFICE O/P EST LOW 20 MIN: CPT | Performed by: FAMILY MEDICINE

## 2023-05-01 PROCEDURE — 1159F MED LIST DOCD IN RCRD: CPT | Performed by: FAMILY MEDICINE

## 2023-05-01 PROCEDURE — 1160F RVW MEDS BY RX/DR IN RCRD: CPT | Performed by: FAMILY MEDICINE

## 2023-05-01 NOTE — PROGRESS NOTES
Primary Care Sports Medicine Office Visit Note     Patient ID: Alyson Lew is a 38 y.o. female.    Chief Complaint:  Chief Complaint   Patient presents with   • Right Wrist - Follow-up, Pain     HPI:    Ms. Alyson Lew is a 38 y.o. female. The patient presents to the clinic today for follow-up evaluation of right wrist pain. She is accompanied by an adult male.    The patient states is doing a lot better. Right now her arm is pulsating and very painful. It has been traveling up into her shoulder. She feels like it is not getting better. When she e-mailed me, she mentioned that there was some popping sensations that were excruciatingly painful, and she literally felt like she put her fingers in an electrical socket. This very same sensation from the shot with the electrical feeling, that repeated multiple times after the shot. It pops in the wrist in the area where it went in. The only thing that even lets her remotely be on that fine line of it is there, and it is painful versus screaming pain. When she has her arm in a sling she can move her elbow, but it is just traveling all the way up into her arm. She is going through those bouts of pain where she literally feels like someone cut off her arm. Right now her hand feels like it is pulsating with pain. She has 3 children, and she needs to use her hand. She has tried a heating pad and the sling, and sometimes it will help. She has had a cyst removed from her left wrist. The injection helped a little bit, but she does not think it is lasting as long as it needs to. When the pain started amping up, it ended up really bad, and then it tapered off, but now it is getting to the point where it is jumping up again. She has had a surgery on her wrist in the past for a ganglion cyst. Her fluid status is always swelled. She has used pain cream in the past. Dr. Rivero has given her oxycodone, and he is supposed to change that up. She is also on gabapentin. Dr. Rivero  is changing her medication on 2023.    Past Medical History:   Diagnosis Date   • Allergic    • Anemia    • Anxiety    • Arthritis    • Asthma    • Cancer     cervical 2008   • Chronic constipation    • Chronic pain disorder    • Depression    • Disease of thyroid gland    • Extremity pain    • Fibromyalgia, primary    • Headache    • Hx of migraines    • Injury of back    • Joint pain    • Low back pain    • Migraine     Hemiplegic migraines   • Neck pain    • Peripheral neuropathy    • PTSD (post-traumatic stress disorder)    • Rheumatoid arthritis    • Shortness of breath    • Stroke     Mini heat stroke   • Tachycardia        Past Surgical History:   Procedure Laterality Date   • CARDIAC ABLATION  2020   • CARDIAC CATHETERIZATION N/A 2019    Procedure: Coronary angiography;  Surgeon: Leo Fagan MD;  Location: [facility] CATH INVASIVE LOCATION;  Service: Cardiovascular   • CARDIAC CATHETERIZATION Left 2019    Procedure: Cardiac Catheterization/Vascular Study;  Surgeon: Leo Fagan MD;  Location: [facility] CATH INVASIVE LOCATION;  Service: Cardiovascular   • CARDIAC CATHETERIZATION N/A 2019    Procedure: Left ventriculography;  Surgeon: Leo Fagan MD;  Location: [facility] CATH INVASIVE LOCATION;  Service: Cardiovascular   • CARDIAC ELECTROPHYSIOLOGY PROCEDURE N/A 2020    Procedure: EP/Ablation;  Surgeon: Luther Walden MD;  Location: [facility] CATH INVASIVE LOCATION;  Service: Cardiovascular;  Laterality: N/A;   •  SECTION      x2   • CHOLECYSTECTOMY     • ENDOSCOPY     • GASTRIC SLEEVE LAPAROSCOPIC     • HAND SURGERY     • PERONEAL TENDON EXPLORATION Right 2020    Procedure: EXTENSOR HALLICUS LONGUS REPAIR AND NERVE DECOMPRESSION;  Surgeon: VALERIA Rajan DPM;  Location: [facility] MAIN OR;  Service: Podiatry;  Laterality: Right;   • TRIGGER POINT INJECTION      Was done by Dr Rivero   • TUBAL ABDOMINAL LIGATION         Family History   Problem Relation Age  "of Onset   • No Known Problems Mother    • Cancer Father    • Coronary artery disease Father    • Diabetes Father    • Stroke Father    • Heart attack Father    • Heart disease Father    • Transient ischemic attack Father    • Migraines Father    • Mental illness Brother    • Mental illness Son    • Migraines Maternal Aunt    • Seizures Maternal Aunt    • Stroke Maternal Aunt    • Migraines Paternal Grandmother      Social History     Occupational History   • Not on file   Tobacco Use   • Smoking status: Never     Passive exposure: Yes   • Smokeless tobacco: Never   • Tobacco comments:     Went to a few puffs a day after fathers pasing   Vaping Use   • Vaping Use: Every day   • Substances: Nicotine, Flavoring   • Devices: Pre-filled or refillable cartridge   Substance and Sexual Activity   • Alcohol use: No   • Drug use: No   • Sexual activity: Yes     Partners: Male     Birth control/protection: Tubal ligation     Comment: Been with Carmen for 8 yrs      Review of Systems   Constitutional: Negative for activity change and fever.   Musculoskeletal: Positive for arthralgias.   Skin: Negative for color change and rash.   Neurological: Positive for weakness and numbness.     Objective:    Pulse 75   Ht 160 cm (63\")   Wt 70.8 kg (156 lb)   SpO2 98%   BMI 27.63 kg/m²     Physical Examination:  Physical Exam  Vitals and nursing note reviewed.   Constitutional:       General: She is not in acute distress.     Appearance: She is well-developed. She is not diaphoretic.   HENT:      Head: Normocephalic and atraumatic.   Eyes:      Conjunctiva/sclera: Conjunctivae normal.   Pulmonary:      Effort: Pulmonary effort is normal. No respiratory distress.   Skin:     General: Skin is warm.      Capillary Refill: Capillary refill takes less than 2 seconds.   Neurological:      Mental Status: She is alert.       Right Hand Exam     Comments:   Right upper extremity exhibits severe tenderness to palpation out of proportion to the " examination of the soft tissues of the forearm and wrist. Tinel's sign is strongly positive. Wrist range of motion otherwise is full to flexion, extension, ulnar deviation, radial deviation, pronation, supination.        Imaging and other tests:  No new imaging today.    Assessment and Plan:    1. Carpal tunnel syndrome of right wrist  - EMG & Nerve Conduction Test; Future    2. Fibromyalgia    3. Rheumatoid arthritis    I discussed pathology and treatment options with the patient. I recommend EMG nerve conduction study for the right upper extremity. The order was placed today. Return to clinic to either me, one of my surgical colleague Dr. Moises Zhu pending test results.    Transcribed from ambient dictation for Markell Segundo II, DO by Melissa Acevedo.  05/01/23   09:42 EDT    Patient or patient representative verbalized consent to the visit recording.  I have personally performed the services described in this document as transcribed by the above individual, and it is both accurate and complete.    Disclaimer: Please note that areas of this note were completed with computer voice recognition software.  Quite often unanticipated grammatical, syntax, homophones, and other interpretive errors are inadvertently transcribed by the computer software. Please excuse any errors that have escaped final proofreading.

## 2023-05-08 NOTE — PROGRESS NOTES
EMG and Nerve Conduction Studies    Patient Name: Alyson Lew    Date of Study:5/9/23    Referring Provider:KAT HILL DO    History:    RUE-this patient is a 38-year-old female complains of pain and paresthesia in the right hand and arm.    Results:    The complete report includes the data sheets.     Prior to starting the procedure, the patient's identity was verified, pertinent available records were reviewed, the nature of the procedure was explained, the appropriate site of the exam were confirmed directly with the patient, and a pre-procedure pause was performed for final verification of all the above.    1.  The right median palmar latency is well within normal limits.  The right median motor distal latency and forearm conduction velocities are normal.    2.  The right palmar distal latencies well within normal limits.  The right ulnar motor distal latency forearm and across elbow conduction velocities are normal.    3.  EMG of the muscles examined in the right C5-T1 myotomes were normal.    Impression:    This is a normal study of the right upper extremity.  There is no evidence of focal median neuropathy at the wrist, ulnar neuropathy at the elbow, or neurogenic change in the muscles examined in the right C5-T1 myotomes.      Electronically signed by :    Joseph Seipel, M.D.  May 9, 2023

## 2023-05-09 ENCOUNTER — PROCEDURE VISIT (OUTPATIENT)
Dept: NEUROLOGY | Facility: CLINIC | Age: 38
End: 2023-05-09
Payer: MEDICAID

## 2023-05-09 DIAGNOSIS — R20.2 PARESTHESIA OF UPPER LIMB: Primary | ICD-10-CM

## 2023-05-09 DIAGNOSIS — G56.01 CARPAL TUNNEL SYNDROME OF RIGHT WRIST: ICD-10-CM

## 2023-05-12 ENCOUNTER — TELEPHONE (OUTPATIENT)
Dept: PAIN MEDICINE | Facility: CLINIC | Age: 38
End: 2023-05-12

## 2023-05-12 DIAGNOSIS — Z79.899 HIGH RISK MEDICATION USE: Primary | ICD-10-CM

## 2023-05-12 RX ORDER — NALOXONE HYDROCHLORIDE 4 MG/.1ML
1 SPRAY NASAL AS NEEDED
Qty: 1 EACH | Refills: 0 | Status: SHIPPED | OUTPATIENT
Start: 2023-05-12 | End: 2023-06-11

## 2023-05-12 NOTE — TELEPHONE ENCOUNTER
DR PIERCE ( DR DOWNING PT- OUT OF OFFICE)-- TO GET PA TO GO THRU FOR PT HER INSURANCE MANAGED HEALTH SERVICES ([facility]) REQUIRES AN RX  FOR NALOXONE TO GET PA FOR MEDICATION   ON XTAMPZA ER 27MG      CAN YOU ESCRIBE  TO [facility]--A NALOXONE RX-- SO WE CAN  TRY TO GET PA TO GO THRU

## 2023-05-12 NOTE — TELEPHONE ENCOUNTER
Caller: BARBARA    Relationship: SELF    Best call back number: [phone]      What was the call regarding: PATIENT IS STATING HER INSURANCE IS NEEDING A PA FOR HER TO ET HER  oxyCODONE ER (Xtampza ER) 27 MG capsule extended-release 12 hour REFILLED.     THIS IS THE PHARMACY SHE WOULD LIKE IT TO BE SENT TO AND IT IS NOT OPEN ON THE WEEKENDS.     [place], [place] - [address] - [phone] [place] [phone] [phone]    Do you require a callback: YES

## 2023-05-25 NOTE — PROGRESS NOTES
Physical Therapy Visit    Visit Type: Daily Treatment Note  Visit: 5  Referring Provider: Yaritza Camara MD  Medical Diagnosis (from order): Diagnosis Information    Diagnosis  438.20 (ICD-9-CM) - I69.359 (ICD-10-CM) - CVA, old, hemiparesis (CMD)  715.90 (ICD-9-CM) - M19.90 (ICD-10-CM) - Osteoarthritis, unspecified osteoarthritis type, unspecified site       Patient alert and oriented X3.    SUBJECTIVE                                                                                                               Patient verbally consented to allow observer present during session.    Patient's daughter reports that she is having the patient walk more at home.    Patient arrived 15 minutes late.    Pain / Symptoms  - Patient denies pain / symptoms.      OBJECTIVE                                                                                                                              Stair Ambulation  - Number of steps: Number of stairs, trial 1: 4-6\"   - Assist Level: contact guard/touching/steadying assist, minimal assist, with verbal cues and with tactile cues (max cuing to maintain attention to task and initiation)  - Rails: right rail only and 2 hands on one rail  - Pattern: step to  - Devices Used: gait belt             Treatment     Gait belt applied and used throughout session.  Therapeutic Activity  Ambulated 100ft, rolling walker, stand by assist, max cuing to facilitate attention to task, therapist providing min A to walker to facilitate continuous stepping with no carry over when therapist stops providing assistance.      Stair negotiation - cuing to facilitate attention to task.      Sit to stand to rolling walker, min A with cuing to facilitate safe hand placement and anterior weight shift.    Skilled input: verbal instruction/cues, tactile instruction/cues, posture correction, facilitation, inhibition and demonstration    Writer verbally educated and received verbal consent for hand placement, positioning  You have chosen to receive care through a telephone visit. Do you consent to use a telephone visit for your medical care today? Yes   of patient, and techniques to be performed today from patient for therapist position for techniques as described above and how they are pertinent to the patient's plan of care.  Home Exercise Program  Access Code: [email or web address]  URL: [email or web address]  Date: 05/18/2023  Prepared by: GUSTAVO ORTEGA    Exercises  - Seated Long Arc Quad  - 1 x daily - 7 x weekly - 3 sets - 10 reps  - Seated March  - 1 x daily - 7 x weekly - 3 sets - 10 reps  - Seated Heel Toe Raises  - 1 x daily - 7 x weekly - 3 sets - 10 reps  - Seated Hip Abduction  - 1 x daily - 7 x weekly - 3 sets - 10 reps  - Seated Hip Adduction Squeeze with Ball  - 1 x daily - 7 x weekly - 3 sets - 10 reps  - Sit to Stand with Armchair  - 1 x daily - 7 x weekly - 3 sets - 10 reps  - Seated Scapular Retraction  - 1 x daily - 7 x weekly - 3 sets - 10 reps        ASSESSMENT                                                                                                            Patient's daughter educated regarding importance of fitness routine at home to maintain functional mobility and prevent decline in function.  Patient's daughter questioning how to improve patient's gait speed and speed of self care activities.  Education provided regarding minimizing distractions, importance of routine practice, and the impact of patient's stroke impairments on functional mobility.  Pain/symptoms after session (out of 10): 0  Education:   - Results of above outlined education: Verbalizes understanding and Demonstrates understanding    PLAN                                                                                                                           Suggestions for next session as indicated: Progress per plan of care       Therapy procedure time and total treatment time can be found documented on the Time Entry flowsheet

## 2023-06-11 ENCOUNTER — HOSPITAL ENCOUNTER (EMERGENCY)
Facility: HOSPITAL | Age: 38
Discharge: HOME | End: 2023-06-11
Attending: STUDENT IN AN ORGANIZED HEALTH CARE EDUCATION/TRAINING PROGRAM | Admitting: STUDENT IN AN ORGANIZED HEALTH CARE EDUCATION/TRAINING PROGRAM
Payer: MEDICAID

## 2023-06-11 ENCOUNTER — APPOINTMENT (OUTPATIENT)
Dept: GENERAL RADIOLOGY | Facility: HOSPITAL | Age: 38
End: 2023-06-11
Payer: MEDICAID

## 2023-06-11 VITALS
SYSTOLIC BLOOD PRESSURE: 113 MMHG | BODY MASS INDEX: 26.46 KG/M2 | OXYGEN SATURATION: 97 % | HEIGHT: 64 IN | WEIGHT: 155 LBS | HEART RATE: 95 BPM | RESPIRATION RATE: 19 BRPM | TEMPERATURE: 98.5 F | DIASTOLIC BLOOD PRESSURE: 79 MMHG

## 2023-06-11 DIAGNOSIS — S92.524A CLOSED NONDISPLACED FRACTURE OF MIDDLE PHALANX OF LESSER TOE OF RIGHT FOOT, INITIAL ENCOUNTER: ICD-10-CM

## 2023-06-11 DIAGNOSIS — N30.01 ACUTE CYSTITIS WITH HEMATURIA: Primary | ICD-10-CM

## 2023-06-11 LAB
AMORPH URATE CRY URNS QL MICRO: ABNORMAL /HPF
BACTERIA UR QL AUTO: ABNORMAL /HPF
BILIRUB UR QL STRIP: ABNORMAL
CLARITY UR: ABNORMAL
COLOR UR: ABNORMAL
GLUCOSE UR STRIP-MCNC: ABNORMAL MG/DL
HGB UR QL STRIP.AUTO: NEGATIVE
HYALINE CASTS UR QL AUTO: ABNORMAL /LPF
KETONES UR QL STRIP: ABNORMAL
LEUKOCYTE ESTERASE UR QL STRIP.AUTO: ABNORMAL
NITRITE UR QL STRIP: POSITIVE
PH UR STRIP.AUTO: 8.5 [PH] (ref 5–8)
PROT UR QL STRIP: ABNORMAL
RBC # UR STRIP: ABNORMAL /HPF
REF LAB TEST METHOD: ABNORMAL
SP GR UR STRIP: <=1.005 (ref 1–1.03)
SQUAMOUS #/AREA URNS HPF: ABNORMAL /HPF
TRANS CELLS #/AREA URNS HPF: ABNORMAL /HPF
UROBILINOGEN UR QL STRIP: ABNORMAL
WBC # UR STRIP: ABNORMAL /HPF

## 2023-06-11 PROCEDURE — 73660 X-RAY EXAM OF TOE(S): CPT

## 2023-06-11 PROCEDURE — 99283 EMERGENCY DEPT VISIT LOW MDM: CPT

## 2023-06-11 PROCEDURE — 87086 URINE CULTURE/COLONY COUNT: CPT | Performed by: STUDENT IN AN ORGANIZED HEALTH CARE EDUCATION/TRAINING PROGRAM

## 2023-06-11 PROCEDURE — 81001 URINALYSIS AUTO W/SCOPE: CPT | Performed by: STUDENT IN AN ORGANIZED HEALTH CARE EDUCATION/TRAINING PROGRAM

## 2023-06-11 RX ORDER — CEPHALEXIN 500 MG/1
500 CAPSULE ORAL 2 TIMES DAILY
Qty: 10 CAPSULE | Refills: 0 | Status: SHIPPED | OUTPATIENT
Start: 2023-06-11 | End: 2023-06-11 | Stop reason: SDUPTHER

## 2023-06-11 RX ORDER — CEPHALEXIN 500 MG/1
500 CAPSULE ORAL 2 TIMES DAILY
Qty: 10 CAPSULE | Refills: 0 | Status: SHIPPED | OUTPATIENT
Start: 2023-06-11 | End: 2023-06-16

## 2023-06-11 NOTE — DISCHARGE INSTRUCTIONS
Return to the ER immediately for severe or worsening symptoms or for the development of any new worrisome symptoms including but not limited to inability to tolerate fluids by mouth, fever (temperature greater than 100.3 degrees), chills, recurrent vomiting, difficulty urinating, bloody urine, or significant pain to your flanks/sides/back.

## 2023-06-11 NOTE — FSED PROVIDER NOTE
Subjective   History of Present Illness  Patient is a 38-year-old female presents emergency department for urinary symptoms and left toe pain.  Patient is a history of anxiety, anemia, chronic constipation, migraines, PTSD.  Patient states she recently completed a 5-day course of Macrobid, but states she is so she is still having dysuria.  She denies any hematuria, flank pain, fever, chills, nausea, vomiting.  Patient has no history of recurrent urinary tract infections.  Patient also states while she was showering she was getting of the shower and she hit her right toe against the shower wall and has had pain and swelling ever since.  She still is ambulating, reports that it is swollen and there is bruising starting.    Review of Systems   Constitutional:  Negative for activity change and fever.   HENT:  Negative for congestion, rhinorrhea and sore throat.    Respiratory:  Negative for cough and shortness of breath.    Cardiovascular:  Negative for chest pain.   Gastrointestinal:  Negative for abdominal pain, nausea and vomiting.   Genitourinary:  Positive for dysuria.   Musculoskeletal:  Positive for arthralgias. Negative for back pain and myalgias.   Skin:  Negative for rash.   Neurological:  Negative for dizziness, light-headedness and headaches.   Psychiatric/Behavioral:  Negative for confusion.      Past Medical History:   Diagnosis Date    Allergic     Anemia     Anxiety     Arthritis     Asthma     Cancer     cervical 2008    Chronic constipation     Chronic pain disorder     Depression     Disease of thyroid gland     Extremity pain     Fibromyalgia, primary     Headache     Hx of migraines     Injury of back     Joint pain     Low back pain     Migraine 2021    Hemiplegic migraines    Neck pain     Peripheral neuropathy     PTSD (post-traumatic stress disorder)     Rheumatoid arthritis 2017    Shortness of breath     Stroke     Mini heat stroke    Tachycardia        Allergies   Allergen Reactions    Bleach  [Sodium Hypochlorite] Anaphylaxis    Adhesive Tape Hives    Latex Hives and Unknown - High Severity       Past Surgical History:   Procedure Laterality Date    CARDIAC ABLATION  2020    CARDIAC CATHETERIZATION N/A 2019    Procedure: Coronary angiography;  Surgeon: Leo Fagan MD;  Location: [facility] CATH INVASIVE LOCATION;  Service: Cardiovascular    CARDIAC CATHETERIZATION Left 2019    Procedure: Cardiac Catheterization/Vascular Study;  Surgeon: Leo Fagan MD;  Location: [facility] CATH INVASIVE LOCATION;  Service: Cardiovascular    CARDIAC CATHETERIZATION N/A 2019    Procedure: Left ventriculography;  Surgeon: Leo Fagan MD;  Location: [facility] CATH INVASIVE LOCATION;  Service: Cardiovascular    CARDIAC ELECTROPHYSIOLOGY PROCEDURE N/A 2020    Procedure: EP/Ablation;  Surgeon: Luther Walden MD;  Location: [facility] CATH INVASIVE LOCATION;  Service: Cardiovascular;  Laterality: N/A;     SECTION      x2    CHOLECYSTECTOMY      ENDOSCOPY      GASTRIC SLEEVE LAPAROSCOPIC      HAND SURGERY      PERONEAL TENDON EXPLORATION Right 2020    Procedure: EXTENSOR HALLICUS LONGUS REPAIR AND NERVE DECOMPRESSION;  Surgeon: VALERIA Rajan DPM;  Location: [facility] MAIN OR;  Service: Podiatry;  Laterality: Right;    TRIGGER POINT INJECTION      Was done by Dr Rivero    TUBAL ABDOMINAL LIGATION         Family History   Problem Relation Age of [place]    No Known Problems Mother     Cancer Father     Coronary artery disease Father     Diabetes Father     Stroke Father     Heart attack Father     Heart disease Father     Transient ischemic attack Father     Migraines Father     Mental illness Brother     Mental illness Son     Migraines Maternal Aunt     Seizures Maternal Aunt     Stroke Maternal Aunt     Migraines Paternal Grandmother        Social History     Socioeconomic History    Marital status: Significant Other   Tobacco Use    Smoking status: Never     Passive exposure: Yes     Smokeless tobacco: Never    Tobacco comments:     Went to a few puffs a day after fathers pasing   Vaping Use    Vaping Use: Every day    Substances: Nicotine, Flavoring    Devices: Pre-filled or refillable cartridge   Substance and Sexual Activity    Alcohol use: No    Drug use: No    Sexual activity: Yes     Partners: Male     Birth control/protection: Tubal ligation     Comment: Been with Carmen for 8 yrs           Objective   Physical Exam  Vitals and nursing note reviewed.   Constitutional:       General: She is not in acute distress.     Appearance: Normal appearance. She is not ill-appearing.   HENT:      Head: Normocephalic and atraumatic.      Nose: Nose normal.      Mouth/Throat:      Mouth: Mucous membranes are moist.   Eyes:      Conjunctiva/sclera: Conjunctivae normal.   Cardiovascular:      Rate and Rhythm: Normal rate and regular rhythm.   Pulmonary:      Effort: Pulmonary effort is normal.   Abdominal:      General: Abdomen is flat.      Palpations: Abdomen is soft.      Tenderness: There is no abdominal tenderness. There is no right CVA tenderness, left CVA tenderness, guarding or rebound.   Musculoskeletal:         General: No swelling or tenderness. Normal range of motion.      Cervical back: Normal range of motion and neck supple.      Left foot: Normal range of motion. Swelling and tenderness present. No deformity or bony tenderness.      Comments: Patient with tenderness, swelling and mild ecchymosis to the right pinky toe.  There is no deformity.  No tenderness over the metatarsal   Skin:     General: Skin is warm and dry.      Findings: No rash.   Neurological:      General: No focal deficit present.      Mental Status: She is alert and oriented to person, place, and time.       Procedures           ED Course  ED Course as of 06/11/23 0326   Sun Jun 11, 2023   0307 Urinalysis without microscopic (no culture) - Urine, Clean Catch(!)  Patient taking azo, results likely inaccurate  [CO]      ED  Course User Index  [CO] Devora Tate,                                            Medical Decision Making  Patient is a 38-year-old female presents emergency department secondary to urinary symptoms and toe pain.  On arrival she is afebrile, hemodynamically stable in no acute distress.  No CVA tenderness, recently completed a course of Macrobid but still having symptoms.  Patient also with tenderness, ecchymosis to right pinky toe, no tenderness over the midfoot or metatarsal.  Differential diagnosis includes urinary tract infection, Bimal, toe fracture or dislocation.  Patient is currently taking Azo so urine results are not accurate, but given she is having symptoms we will change antibiotic to Keflex and resend with micro.  X-ray does show a slight small avulsion fracture to the right phalanx of the small toe.  Patient had toes buddy taped, discharged home with abx.     Problems Addressed:  Acute cystitis with hematuria: complicated acute illness or injury  Closed nondisplaced fracture of middle phalanx of lesser toe of right foot, initial encounter: complicated acute illness or injury    Amount and/or Complexity of Data Reviewed  Labs:  Decision-making details documented in ED Course.  Radiology: ordered and independent interpretation performed.    Risk  Prescription drug management.        Final diagnoses:   Acute cystitis with hematuria   Closed nondisplaced fracture of middle phalanx of lesser toe of right foot, initial encounter       ED Disposition  ED Disposition       ED Disposition   Discharge    Condition   Stable    Comment   --               Michelle Narayanan MD  [address] [facility] [address]  [place] [address] [phone]    Schedule an appointment as soon as possible for a visit in 1 week      [address] [address] [phone]    As needed, If symptoms worsen         Medication List        New Prescriptions       cephalexin 500 MG capsule  Commonly known as: KEFLEX  Take 1 capsule by mouth 2 (Two) Times a Day for 5 days.               Where to Get Your Medications        These medications were sent to [facility] #[id number] - [place], [place] - [address] [facility] - [phone]  - [phone] [address] [place] [address]      Hours: 24-hours Phone: [phone]   cephalexin 500 MG capsule

## 2023-06-12 LAB — BACTERIA SPEC AEROBE CULT: NO GROWTH

## 2023-07-27 ENCOUNTER — OFFICE VISIT (OUTPATIENT)
Dept: PAIN MEDICINE | Facility: CLINIC | Age: 38
End: 2023-07-27
Payer: MEDICAID

## 2023-07-27 VITALS
RESPIRATION RATE: 16 BRPM | DIASTOLIC BLOOD PRESSURE: 59 MMHG | SYSTOLIC BLOOD PRESSURE: 100 MMHG | OXYGEN SATURATION: 99 % | HEART RATE: 55 BPM

## 2023-07-27 DIAGNOSIS — K59.03 DRUG-INDUCED CONSTIPATION: ICD-10-CM

## 2023-07-27 DIAGNOSIS — Z79.899 HIGH RISK MEDICATION USE: Primary | ICD-10-CM

## 2023-07-27 DIAGNOSIS — G89.29 NECK PAIN, CHRONIC: ICD-10-CM

## 2023-07-27 DIAGNOSIS — Z79.899 OTHER LONG TERM (CURRENT) DRUG THERAPY: ICD-10-CM

## 2023-07-27 DIAGNOSIS — M54.50 CHRONIC MIDLINE LOW BACK PAIN, UNSPECIFIED WHETHER SCIATICA PRESENT: Primary | ICD-10-CM

## 2023-07-27 DIAGNOSIS — M25.511 CHRONIC RIGHT SHOULDER PAIN: ICD-10-CM

## 2023-07-27 DIAGNOSIS — M79.7 FIBROMYALGIA: ICD-10-CM

## 2023-07-27 DIAGNOSIS — G89.29 CHRONIC RIGHT SHOULDER PAIN: ICD-10-CM

## 2023-07-27 DIAGNOSIS — M79.601 PAIN IN RIGHT ARM: ICD-10-CM

## 2023-07-27 DIAGNOSIS — M46.1 SACROILIITIS, NOT ELSEWHERE CLASSIFIED: ICD-10-CM

## 2023-07-27 DIAGNOSIS — G89.29 CHRONIC MIDLINE LOW BACK PAIN, UNSPECIFIED WHETHER SCIATICA PRESENT: Primary | ICD-10-CM

## 2023-07-27 DIAGNOSIS — M25.50 PAIN IN JOINT INVOLVING MULTIPLE SITES: ICD-10-CM

## 2023-07-27 DIAGNOSIS — M54.16 LUMBAR RADICULOPATHY: ICD-10-CM

## 2023-07-27 DIAGNOSIS — M54.2 NECK PAIN, CHRONIC: ICD-10-CM

## 2023-07-27 RX ORDER — OXYCODONE 27 MG/1
1 CAPSULE, EXTENDED RELEASE ORAL EVERY 12 HOURS
Qty: 60 EACH | Refills: 0 | Status: SHIPPED | OUTPATIENT
Start: 2023-07-27

## 2023-07-27 RX ORDER — GABAPENTIN 400 MG/1
400 CAPSULE ORAL 4 TIMES DAILY
Qty: 120 CAPSULE | Refills: 5 | Status: SHIPPED | OUTPATIENT
Start: 2023-07-27

## 2023-07-27 RX ORDER — TIZANIDINE 4 MG/1
4 TABLET ORAL EVERY 8 HOURS PRN
Qty: 90 TABLET | Refills: 11 | Status: SHIPPED | OUTPATIENT
Start: 2023-07-27

## 2023-07-27 NOTE — PROGRESS NOTES
"Subjective   Alyson Lew is a 38 y.o. female.     History of Present Illness  all over pain, especially back, neck and bilateral hip pain, right knee. Dz with fibromyalgia and inflammatory arthritis by Rheum, on DMARDs and Lyrica 100mg BID at initial visit with poor control, pain worsening, 10/10 at worst, 7/10 at best, always present, prevents work and housework, burning, sharp, varies. Prior notes reviewed, referred for worsening pain, does not have time for PT. Increased to Lyrica 100mg BID then TID, started Celebrex, Cymbalta 30mg then 60mg qHS, fewer flares but still severe fibromyalgia pain, also LBP and b/l hip pain. Started Norco 5/325mg, mostly taking qHS, some days BID with worsening pain with cold weather, becoming less effective, rotated to Percocet 5/325mg. C/o insomnia. Went to ED with severe pain not controlled with Percocet 5/325mg BID prn, now QID prn. Had gastric sleeve surgery, liquid Norco worked well, restarted Percocet with severe N/V, vomited up complete prescription. Started liquid Norco, working better, but pain worsening, having difficulty with ADLs, increased Norco and Lyrica, then MS-IR 15mg QID prn with pill . Worsening L \"hip\" pain, insomnia, constipation. Requesting NSAID. Had toni. Has FH of autoimmune disorders and MS, hasn't heard from Rheum. Needs PT before having injection, worsening SIJ pain even with PT, had b/l SIJ injections, helped, would like to repeat.    Also reports \"episodes\" where she loses the ability to speak c/w Broca's aphasia, resolves over 3-4 days spontaneously, had CT-brain in ED which was negative. Has h/o tachycardia, failed ablation, is not currently medicated.   Back Pain  Pertinent negatives include no abdominal pain, bladder incontinence, chest pain, fever, numbness or weakness.      The following portions of the patient's history were reviewed and updated as appropriate: allergies, current medications, past family history, past medical " history, past social history, past surgical history and problem list.    Review of Systems   Constitutional:  Negative for chills, fatigue and fever.   HENT:  Negative for hearing loss and trouble swallowing.    Eyes:  Positive for visual disturbance.   Respiratory:  Positive for shortness of breath.    Cardiovascular:  Negative for chest pain.   Gastrointestinal:  Positive for constipation. Negative for abdominal pain, diarrhea, nausea and vomiting.   Genitourinary:  Negative for urinary incontinence.   Musculoskeletal:  Positive for arthralgias, back pain and neck pain. Negative for joint swelling and myalgias.   Neurological:  Positive for headache. Negative for dizziness, weakness and numbness.     Objective   Physical Exam   Constitutional: She is oriented to person, place, and time. She appears well-developed and well-nourished.   HENT:   Head: Normocephalic and atraumatic.   Eyes: Pupils are equal, round, and reactive to light.   Cardiovascular: Normal rate, regular rhythm, normal heart sounds and normal pulses.   No tachycardia or irregularity to pulses today   Pulmonary/Chest: Effort normal and breath sounds normal.   Abdominal: Soft. Bowel sounds are normal. She exhibits no distension. There is no abdominal tenderness.   Musculoskeletal: Tenderness present.      Comments: positive gianni finger test, TTP over b/l SIJ, b/l CARINA, and Gaenslens   Neurological: She is alert and oriented to person, place, and time. She has normal reflexes. She displays normal reflexes. No sensory deficit.   Psychiatric: Her behavior is normal. Thought content normal.       Assessment & Plan   Diagnoses and all orders for this visit:    1. Chronic midline low back pain, unspecified whether sciatica present (Primary)    2. Lumbar radiculopathy    3. Drug-induced constipation    4. Fibromyalgia    5. Neck pain, chronic    6. Other long term (current) drug therapy    7. Pain in joint involving multiple sites    8. Pain in right  arm    9. Sacroiliitis, not elsewhere classified    10. Chronic right shoulder pain        INspect reviewed, in order. Low risk. Repeat 7/14/22 in order.  Increased to Lyrica 200mg TID, sedating, numbs her lips, but benefit outweighs side effects, not covered by insurance. Began Gabapentin 300mg TID, titrated, tolerating well, increased to 400mg TID, increased to 400mg QID.  Cont Cymbalta 60mg qHS, helping, and sleeping better.  Stopped Norco 5/325mg, failed Percocet 5/325mg, started liquid Norco 7.5/325mg/15ml 15ml QID prn, #1800. Increased to q4h prn, #2700, no longer working. Increased to MS-IR 15mg q6h prn,then MS-Contin 30mg BID, was helping a lot more but tolerant. Rotated to Percocet 10mg QID prn, then q4h prn. Used new refill at q4h then rotated to Xtampza 27mg BID.  Failed Mobic 7.5mg qd - BID prn, failed Celebrex.  Began Silenor, denied. Begin Doxepin 10mg qHS prn.  Restart Tizanidine 4mg TID prn.  Cont Relistor 450mg qdaily, failed Miralax, colace.  Began RxAlt #2 compounded cream.  Ordered Flector BID prn.  New referral to Rheum.  Referral to PT for LBP and L hip pain.  Performed b/l SIJ injection. Repeat denied for no recent PT, but patient has failed PT in past.  Referral to Dr. Seiple for possible h/o TIAs.  Referral to Cardiology for unmanaged (supraventricular?) tachycardia.  Had R CTS injection with Dr. Segundo, still painful, likely needs surgical release.  RTC in 3 months for f/u.

## 2023-08-02 DIAGNOSIS — G43.109 MIGRAINE EQUIVALENT SYNDROME: ICD-10-CM

## 2023-08-02 RX ORDER — SUMATRIPTAN 100 MG/1
TABLET, FILM COATED ORAL
Qty: 27 TABLET | Refills: 3 | OUTPATIENT
Start: 2023-08-02

## 2023-08-04 ENCOUNTER — PATIENT MESSAGE (OUTPATIENT)
Dept: PAIN MEDICINE | Facility: CLINIC | Age: 38
End: 2023-08-04
Payer: MEDICAID

## 2023-08-04 RX ORDER — DOXEPIN HYDROCHLORIDE 6 MG/1
TABLET ORAL
COMMUNITY

## 2023-08-04 RX ORDER — DOXEPIN HYDROCHLORIDE 10 MG/1
10 CAPSULE ORAL NIGHTLY PRN
Qty: 30 CAPSULE | Refills: 2 | Status: SHIPPED | OUTPATIENT
Start: 2023-08-04

## 2023-08-09 ENCOUNTER — HOSPITAL ENCOUNTER (OUTPATIENT)
Facility: HOSPITAL | Age: 38
Discharge: HOME | End: 2023-08-09
Attending: EMERGENCY MEDICINE | Admitting: EMERGENCY MEDICINE
Payer: MEDICAID

## 2023-08-09 VITALS
TEMPERATURE: 98.2 F | HEART RATE: 103 BPM | RESPIRATION RATE: 16 BRPM | BODY MASS INDEX: 28.35 KG/M2 | SYSTOLIC BLOOD PRESSURE: 139 MMHG | OXYGEN SATURATION: 97 % | HEIGHT: 63 IN | DIASTOLIC BLOOD PRESSURE: 85 MMHG | WEIGHT: 160 LBS

## 2023-08-09 DIAGNOSIS — J06.9 VIRAL UPPER RESPIRATORY TRACT INFECTION WITH COUGH: Primary | ICD-10-CM

## 2023-08-09 LAB
FLUAV SUBTYP SPEC NAA+PROBE: NOT DETECTED
FLUBV RNA ISLT QL NAA+PROBE: NOT DETECTED
SARS-COV-2 RNA RESP QL NAA+PROBE: NOT DETECTED
STREP A PCR: NOT DETECTED

## 2023-08-09 PROCEDURE — G0463 HOSPITAL OUTPT CLINIC VISIT: HCPCS | Performed by: NURSE PRACTITIONER

## 2023-08-09 PROCEDURE — 87651 STREP A DNA AMP PROBE: CPT | Performed by: EMERGENCY MEDICINE

## 2023-08-09 PROCEDURE — 87636 SARSCOV2 & INF A&B AMP PRB: CPT | Performed by: EMERGENCY MEDICINE

## 2023-08-09 RX ORDER — CHLORHEXIDINE GLUCONATE 0.12 MG/ML
15 RINSE ORAL 4 TIMES DAILY
Qty: 600 ML | Refills: 0 | Status: SHIPPED | OUTPATIENT
Start: 2023-08-09 | End: 2023-08-19

## 2023-08-09 NOTE — FSED PROVIDER NOTE
Subjective   History of Present Illness  The patient is a 38-year-old female who presents to the ER with sore throat, sinus pain and pressure that started this morning. Patient reports that she feels like there is razor blades in the top of her mouth, and her throat area. Patient reports she has been using peridex mouth wash but is out of it, she is waiting on oral surgery to call her for an appointment.  Patient actually points to the roof of her mouth for pain    History provided by:  Patient and spouse   used: No      Review of Systems   HENT:  Positive for postnasal drip, sinus pressure, sinus pain and sore throat.      Past Medical History:   Diagnosis Date    Allergic     Anemia     Anxiety     Arthritis     Asthma     Cancer     cervical 2008    Chronic constipation     Chronic pain disorder     Depression     Disease of thyroid gland     Extremity pain     Fibromyalgia, primary     Headache     Hx of migraines     Injury of back     Joint pain     Low back pain     Migraine 2021    Hemiplegic migraines    Neck pain     Peripheral neuropathy     PTSD (post-traumatic stress disorder)     Rheumatoid arthritis 2017    Shortness of breath     Stroke     Mini heat stroke    Tachycardia        Allergies   Allergen Reactions    Bleach [Sodium Hypochlorite] Anaphylaxis    Adhesive Tape Hives    Latex Hives and Unknown - High Severity       Past Surgical History:   Procedure Laterality Date    CARDIAC ABLATION  02/11/2020    CARDIAC CATHETERIZATION N/A 11/05/2019    Procedure: Coronary angiography;  Surgeon: Leo Fagan MD;  Location: [facility] CATH INVASIVE LOCATION;  Service: Cardiovascular    CARDIAC CATHETERIZATION Left 11/05/2019    Procedure: Cardiac Catheterization/Vascular Study;  Surgeon: Leo Fagan MD;  Location: [facility] CATH INVASIVE LOCATION;  Service: Cardiovascular    CARDIAC CATHETERIZATION N/A 11/05/2019    Procedure: Left ventriculography;  Surgeon: Leo Fagan MD;  Location:   [facility] CATH INVASIVE LOCATION;  Service: Cardiovascular    CARDIAC ELECTROPHYSIOLOGY PROCEDURE N/A 2020    Procedure: EP/Ablation;  Surgeon: Luther Walden MD;  Location: [facility] CATH INVASIVE LOCATION;  Service: Cardiovascular;  Laterality: N/A;     SECTION      x2    CHOLECYSTECTOMY      ENDOSCOPY      GASTRIC SLEEVE LAPAROSCOPIC      HAND SURGERY      PERONEAL TENDON EXPLORATION Right 2020    Procedure: EXTENSOR HALLICUS LONGUS REPAIR AND NERVE DECOMPRESSION;  Surgeon: VALERIA Rajan DPM;  Location: [facility] MAIN OR;  Service: Podiatry;  Laterality: Right;    TRIGGER POINT INJECTION      Was done by Dr Rivero    TUBAL ABDOMINAL LIGATION         Family History   Problem Relation Age of [place]    No Known Problems Mother     Cancer Father     Coronary artery disease Father     Diabetes Father     Stroke Father     Heart attack Father     Heart disease Father     Transient ischemic attack Father     Migraines Father     Mental illness Brother     Mental illness Son     Migraines Maternal Aunt     Seizures Maternal Aunt     Stroke Maternal Aunt     Migraines Paternal Grandmother        Social History     Socioeconomic History    Marital status: Significant Other   Tobacco Use    Smoking status: Never     Passive exposure: Yes    Smokeless tobacco: Never    Tobacco comments:     Went to a few puffs a day after fathers pasing   Vaping Use    Vaping Use: Every day    Substances: Nicotine, Flavoring    Devices: Pre-filled or refillable cartridge   Substance and Sexual Activity    Alcohol use: No    Drug use: No    Sexual activity: Yes     Partners: Male     Birth control/protection: Tubal ligation     Comment: Been with Sparkle for 8 yrs           Objective   Physical Exam  Vitals and nursing note reviewed.   Constitutional:       Appearance: Normal appearance.   HENT:      Head: Normocephalic.      Right Ear: Tympanic membrane normal.      Left Ear: Tympanic membrane normal.      Nose: Nose normal.       Mouth/Throat:      Lips: Pink. No lesions.      Mouth: Mucous membranes are moist. No injury, lacerations or oral lesions.      Tongue: No lesions. Tongue does not deviate from midline.      Pharynx: Oropharynx is clear. Uvula midline.      Comments: Patient with no redness, no erythema, no open area noted to the roof of patient mouth or throat area. Teeth are all intact, no erythema to the gums, uvula midline, no tonsillar swelling noted.   Eyes:      Extraocular Movements: Extraocular movements intact.      Conjunctiva/sclera: Conjunctivae normal.      Pupils: Pupils are equal, round, and reactive to light.   Cardiovascular:      Rate and Rhythm: Normal rate and regular rhythm.      Pulses: Normal pulses.      Heart sounds: Normal heart sounds.   Pulmonary:      Effort: Pulmonary effort is normal.      Breath sounds: Normal breath sounds.   Abdominal:      Palpations: Abdomen is soft.   Musculoskeletal:         General: Normal range of motion.      Cervical back: Normal range of motion and neck supple.   Skin:     General: Skin is warm and dry.   Neurological:      General: No focal deficit present.      Mental Status: She is alert and oriented to person, place, and time.   Psychiatric:         Mood and Affect: Mood normal.         Behavior: Behavior normal. Behavior is cooperative.       Procedures           ED Course  ED Course as of 08/09/23 1413   Wed Aug 09, 2023   1413 COVID19: Not Detected [DS]   1413 Influenza A PCR: Not Detected [DS]   1413 Influenza B PCR: Not Detected [DS]   1413 STREP A PCR: Not Detected [DS]      ED Course User Index  [DS] Destini Scruggs APRN                                           Medical Decision Making  The patient is a 38-year-old female who presents to the ER with sore throat, sinus pain and pressure that started this morning. Patient reports that she feels like there is razor blades in the top of her mouth, and her throat area. Patient reports she has been using  peridex mouth wash but is out of it, she is waiting on oral surgery to call her for an appointment.  Patient actually points to the roof of her mouth for the pain.   No history of immunocompromise. Nontoxic appearance. Patient euvolemic with no trismus. No airway compromise. No change in voice, exudates, enlarged lymph nodes. Able to tolerate PO. Given History and Exam I have low suspicion for this presentation being caused by PTA, RPA, Ludwigs angina, Epiglottitis or Bacterial Tracheitis, EBV, acute HIV, or Strep throat.    Problems Addressed:  Viral upper respiratory tract infection with cough: self-limited or minor problem    Risk  OTC drugs.        Final diagnoses:   Viral upper respiratory tract infection with cough       ED Disposition  ED Disposition       ED Disposition   Discharge    Condition   Stable    Comment   --               Michelle Narayanan MD  [address] [facility] [address]  [place] [address] [phone]      As needed, If symptoms worsen         Medication List        New Prescriptions      chlorhexidine 0.12 % solution  Commonly known as: PERIDEX  Apply 15 mL to the mouth or throat 4 (Four) Times a Day for 10 days.               Where to Get Your Medications        These medications were sent to [facility], [place] - [address] - [phone]  - [phone]   [address] [place] [address]      Phone: [phone]   chlorhexidine 0.12 % solution

## 2023-08-09 NOTE — DISCHARGE INSTRUCTIONS
Follow-up with primary care for further evaluation and treatment.  You can also follow-up with dentist as needed.     You can get sinus medications over the counter, such as tylenol sinus, advil sinus. You can also get claritin, allegra, or zyrtec over the counter as per manufactoring directions,     You can get chloraseptic throat spray, or chloraseptic throat lozenges and use as needed.     You can also use warm salt water gargles to help with pain,     You can also get saline nasal spray to use as per manufactoring directions.

## 2023-09-11 ENCOUNTER — TELEPHONE (OUTPATIENT)
Dept: PAIN MEDICINE | Facility: CLINIC | Age: 38
End: 2023-09-11

## 2023-09-11 NOTE — TELEPHONE ENCOUNTER
Hub staff attempted to follow warm transfer process and was unsuccessful     Caller: PATIENT    Relationship to patient: SELF     Best call back number: [phone]    Patient is needing: PATIENT WAS CALLING TO DISCUSS NEEDING A PRIOR AUTHORIZATION FOR HER PRESCRIPTION, XTAMPZA ER 27 MG. PATIENT STATED SHE IS NEEDING THIS TO BE TAKEN CARE OF ASAP DUE TO TAKING HER LAST DOSE OF MEDICATION ALREADY. PATIENT STATED HER PHARMACY, [facility] IN [place], [place] INFORMED HER PRIOR AUTHORIZATION WAS NEEDED FOR THIS PRESCRIPTION. PATIENT WOULD LIKE A CALL BACK ASAP TO DISCUSS THIS. THANK YOU!

## 2023-09-20 DIAGNOSIS — G43.109 MIGRAINE EQUIVALENT SYNDROME: ICD-10-CM

## 2023-09-20 RX ORDER — SUMATRIPTAN 20 MG/1
SPRAY NASAL
Qty: 6 EACH | Refills: 3 | Status: SHIPPED | OUTPATIENT
Start: 2023-09-20

## 2023-10-10 RX ORDER — DOXEPIN HYDROCHLORIDE 6 MG/1
1 TABLET ORAL NIGHTLY PRN
Qty: 30 TABLET | Refills: 2 | OUTPATIENT
Start: 2023-10-10

## 2023-10-11 ENCOUNTER — TELEPHONE (OUTPATIENT)
Dept: ORTHOPEDIC SURGERY | Facility: CLINIC | Age: 38
End: 2023-10-11

## 2023-10-11 DIAGNOSIS — M25.531 RIGHT WRIST PAIN: Primary | ICD-10-CM

## 2023-10-11 NOTE — TELEPHONE ENCOUNTER
Caller: Alyson Lew    Relationship to patient: Self    Best call back number:     Chief complaint: RIGHT WRIST     Type of visit: FUP     Requested date: 10/19      Additional notes:PATIENT IS IN EXTREME PAIN.  SHE SAID SHE HAS TROUBLE GETTING RIDES AND WANTS TO TRY AND GET IN ON 10/19 BECAUSE SHE HAS A PAIN MGMT APPOINTMENT THAT DAY AND ALREADY HAS A RIDE LOCKED IN. PLEASE CONTACT PATIENT           Protocol For Photochemotherapy For Severe Photoresponsive Dermatoses: Petrolatum And Broad Band Uvb: The patient received Photochemotherapyfor severe photoresponsive dermatoses: Petrolatum and Broad Band UVB requiring at least 4 to 8 hours of care under direct physician supervision. Protocol For Photochemotherapy: Mineral Oil And Broad Band Uvb: The patient received Photochemotherapy: Mineral Oil and Broad Band UVB. Protocol For Bath Puva: The patient received Bath PUVA. Protocol For Photochemotherapy: Tar And Broad Band Uvb (Goeckerman Treatment): The patient received Photochemotherapy: Tar and Broad Band UVB (Goeckerman treatment). Protocol: PUVA Protocol For Photochemotherapy For Severe Photoresponsive Dermatoses: Tar And Nbuvb (Goeckerman Treatment): The patient received Photochemotherapy for severe photoresponsive dermatoses: Tar and NBUVB (Goeckerman treatment) requiring at least 4 to 8 hours of care under direct physician supervision. Name Of Supervising Technician: JOSE Protocol For Uva: The patient received UVA. Protocol For Puva: The patient received PUVA. Protocol For Nbuvb: The patient received NBUVB. Treatment Number: [address] Protocol For Photochemotherapy: Baby Oil And Nbuvb: The patient received Photochemotherapy: Baby Oil and NBUVB (baby oil applied to all lesions prior to phototherapy). Protocol For Photochemotherapy: Mineral Oil And Nbuvb: The patient received Photochemotherapy: Mineral Oil and NBUVB (mineral oil applied to all lesions prior to phototherapy). Detail Level: Generalized Protocol For Photochemotherapy For Severe Photoresponsive Dermatoses: Petrolatum And Nbuvb: The patient received Photochemotherapy for severe photoresponsive dermatoses: Petrolatum and NBUVB requiring at least 4 to 8 hours of care under direct physician supervision. Protocol For Uva1: The patient received UVA1. Protocol For Photochemotherapy For Severe Photoresponsive Dermatoses: Tar And Broad Band Uvb (Goeckerman Treatment): The patient received Photochemotherapy for severe photoresponsive dermatoses: Tar and Broad Band UVB (Goeckerman treatment) requiring at least 4 to 8 hours of care under direct physician supervision. Post-Care Instructions: I reviewed with the patient in detail post-care instructions. Patient is to wear sun protection. Patients may expect sunburn like redness, discomfort and scabbing. Protocol For Photochemotherapy: Petrolatum And Broad Band Uvb: The patient received Photochemotherapy: Petrolatum and Broad Band UVB. Protocol For Photochemotherapy For Severe Photoresponsive Dermatoses: Puva: The patient received Photochemotherapy for severe photoresponsive dermatoses: PUVA requiring at least 4 to 8 hours of care under direct physician supervision. Protocol For Photochemotherapy: Petrolatum And Nbuvb: The patient received Photochemotherapy: Petrolatum and NBUVB (petrolatum applied to all lesions prior to phototherapy). Protocol For Nb Uva: The patient received NB UVA. Total Treatment Time: 1:29 EACH SIDE Protocol For Broad Band Uvb: The patient received Broad Band UVB. Protocol For Protocol For Photochemotherapy For Severe Photoresponsive Dermatoses: Bath Puva: The patient received Photochemotherapy for severe photoresponsive dermatoses: Bath PUVA requiring at least 4 to 8 hours of care under direct physician supervision. Consent: Written consent obtained. The risks were reviewed with the patient including but not limited to: burn, pigmentary changes, pain, blistering, scabbing, redness, increased risk of skin cancers, and the remote possibility of scarring. Protocol For Photochemotherapy: Tar And Nbuvb (Goeckerman Treatment): The patient received Photochemotherapy: Tar and NBUVB (Goeckerman treatment). Protocol For Photochemotherapy: Triamcinolone Ointment And Nbuvb: The patient received Photochemotherapy: Triamcinolone and NBUVB (triamcinolone ointment applied to all lesions prior to phototherapy).

## 2023-10-11 NOTE — TELEPHONE ENCOUNTER
When I spoke to the patient she was upset she could not get in on that same day, but I was able to get her in the next day. In the mean time she wants to know what she can do for the pain she is having. The pain is now shooting up her arm and into her neck.

## 2023-10-19 ENCOUNTER — OFFICE VISIT (OUTPATIENT)
Dept: PAIN MEDICINE | Facility: CLINIC | Age: 38
End: 2023-10-19
Payer: MEDICAID

## 2023-10-19 VITALS
SYSTOLIC BLOOD PRESSURE: 119 MMHG | RESPIRATION RATE: 16 BRPM | DIASTOLIC BLOOD PRESSURE: 75 MMHG | HEART RATE: 75 BPM | OXYGEN SATURATION: 95 %

## 2023-10-19 DIAGNOSIS — M54.2 NECK PAIN, CHRONIC: ICD-10-CM

## 2023-10-19 DIAGNOSIS — M25.511 CHRONIC RIGHT SHOULDER PAIN: ICD-10-CM

## 2023-10-19 DIAGNOSIS — M46.1 SACROILIITIS, NOT ELSEWHERE CLASSIFIED: ICD-10-CM

## 2023-10-19 DIAGNOSIS — M25.50 PAIN IN JOINT INVOLVING MULTIPLE SITES: ICD-10-CM

## 2023-10-19 DIAGNOSIS — M54.50 CHRONIC MIDLINE LOW BACK PAIN, UNSPECIFIED WHETHER SCIATICA PRESENT: Primary | ICD-10-CM

## 2023-10-19 DIAGNOSIS — K59.03 DRUG-INDUCED CONSTIPATION: ICD-10-CM

## 2023-10-19 DIAGNOSIS — G89.29 CHRONIC RIGHT SHOULDER PAIN: ICD-10-CM

## 2023-10-19 DIAGNOSIS — G89.29 CHRONIC MIDLINE LOW BACK PAIN, UNSPECIFIED WHETHER SCIATICA PRESENT: Primary | ICD-10-CM

## 2023-10-19 DIAGNOSIS — Z79.899 OTHER LONG TERM (CURRENT) DRUG THERAPY: ICD-10-CM

## 2023-10-19 DIAGNOSIS — G89.29 NECK PAIN, CHRONIC: ICD-10-CM

## 2023-10-19 DIAGNOSIS — M79.7 FIBROMYALGIA: ICD-10-CM

## 2023-10-19 DIAGNOSIS — M54.16 LUMBAR RADICULOPATHY: ICD-10-CM

## 2023-10-19 RX ORDER — OXYCODONE 27 MG/1
1 CAPSULE, EXTENDED RELEASE ORAL EVERY 12 HOURS
Qty: 60 EACH | Refills: 0 | Status: SHIPPED | OUTPATIENT
Start: 2023-10-19

## 2023-10-19 RX ORDER — GABAPENTIN 600 MG/1
600 TABLET ORAL 4 TIMES DAILY
Qty: 120 TABLET | Refills: 2 | Status: SHIPPED | OUTPATIENT
Start: 2023-10-19

## 2023-10-19 NOTE — PROGRESS NOTES
"Subjective   Alyson Lew is a 38 y.o. female.     History of Present Illness  all over pain, especially back, neck and bilateral hip pain, right knee. Dz with fibromyalgia and inflammatory arthritis by Rheum, on DMARDs and Lyrica 100mg BID at initial visit with poor control, pain worsening, 10/10 at worst, 7/10 at best, always present, prevents work and housework, burning, sharp, varies. Prior notes reviewed, referred for worsening pain, does not have time for PT. Increased to Lyrica 100mg BID then TID, started Celebrex, Cymbalta 30mg then 60mg qHS, fewer flares but still severe fibromyalgia pain, also LBP and b/l hip pain. Started Norco 5/325mg, mostly taking qHS, some days BID with worsening pain with cold weather, becoming less effective, rotated to Percocet 5/325mg. C/o insomnia. Went to ED with severe pain not controlled with Percocet 5/325mg BID prn, now QID prn. Had gastric sleeve surgery, liquid Norco worked well, restarted Percocet with severe N/V, vomited up complete prescription. Started liquid Norco, working better, but pain worsening, having difficulty with ADLs, increased Norco and Lyrica, then MS-IR 15mg QID prn with pill . Worsening L \"hip\" pain, insomnia, constipation. Requesting NSAID. Had toni. Has FH of autoimmune disorders and MS, hasn't heard from Rheum. Needs PT before having injection, worsening SIJ pain even with PT, had b/l SIJ injections, helped, would like to repeat.    Also reports \"episodes\" where she loses the ability to speak c/w Broca's aphasia, resolves over 3-4 days spontaneously, had CT-brain in ED which was negative. Has h/o tachycardia, failed ablation, is not currently medicated.   Back Pain  Pertinent negatives include no abdominal pain, bladder incontinence, chest pain, fever, numbness or weakness.        The following portions of the patient's history were reviewed and updated as appropriate: allergies, current medications, past family history, past medical " history, past social history, past surgical history and problem list.    Review of Systems   Constitutional:  Negative for chills, fatigue and fever.   HENT:  Negative for hearing loss and trouble swallowing.    Eyes:  Positive for visual disturbance.   Respiratory:  Positive for shortness of breath.    Cardiovascular:  Negative for chest pain.   Gastrointestinal:  Positive for constipation. Negative for abdominal pain, diarrhea, nausea and vomiting.   Genitourinary:  Negative for urinary incontinence.   Musculoskeletal:  Positive for arthralgias, back pain and neck pain. Negative for joint swelling and myalgias.   Neurological:  Positive for headache. Negative for dizziness, weakness and numbness.       Objective   Physical Exam   Constitutional: She is oriented to person, place, and time. She appears well-developed and well-nourished.   HENT:   Head: Normocephalic and atraumatic.   Eyes: Pupils are equal, round, and reactive to light.   Cardiovascular: Normal rate, regular rhythm, normal heart sounds and normal pulses.   No tachycardia or irregularity to pulses today   Pulmonary/Chest: Effort normal and breath sounds normal.   Abdominal: Soft. Bowel sounds are normal. She exhibits no distension. There is no abdominal tenderness.   Musculoskeletal: Tenderness present.      Comments: positive gianni finger test, TTP over b/l SIJ, b/l CARINA, and Gaenslens   Neurological: She is alert and oriented to person, place, and time. She has normal reflexes. She displays normal reflexes. No sensory deficit.   Psychiatric: Her behavior is normal. Thought content normal.         Assessment & Plan   Diagnoses and all orders for this visit:    1. Chronic midline low back pain, unspecified whether sciatica present (Primary)    2. Lumbar radiculopathy    3. Fibromyalgia    4. Drug-induced constipation    5. Neck pain, chronic    6. Other long term (current) drug therapy    7. Pain in joint involving multiple sites    8.  Sacroiliitis, not elsewhere classified    9. Chronic right shoulder pain        INspect reviewed, in order. Low risk. Repeat 7/27/23 in order.  Increased to Lyrica 200mg TID, sedating, numbs her lips, but benefit outweighs side effects, not covered by insurance. Began Gabapentin 300mg TID, titrated, tolerating well, increased to 400mg TID, increased to 400mg QID, increase to 600mg QID prn.  Cont Cymbalta 60mg qHS, helping, and sleeping better.  Stopped Norco 5/325mg, failed Percocet 5/325mg, started liquid Norco 7.5/325mg/15ml 15ml QID prn, #1800. Increased to q4h prn, #2700, no longer working. Increased to MS-IR 15mg q6h prn,then MS-Contin 30mg BID, was helping a lot more but tolerant. Rotated to Percocet 10mg QID prn, then q4h prn. Used new refill at q4h then rotated to Xtampza 27mg BID. Filled 10/11/23.   Failed Mobic 7.5mg qd - BID prn, failed Celebrex.  Began Silenor, denied. Began Doxepin 10mg qHS prn.  Restart Tizanidine 4mg TID prn.  Cont Relistor 450mg qdaily, failed Miralax, colace.  Began RxAlt #2 compounded cream.  Ordered Flector BID prn.  New referral to Rheum.  Referral to PT for LBP and L hip pain.  Performed b/l SIJ injection. Repeat denied for no recent PT, but patient has failed PT in past.  Referral to Dr. Seiple for possible h/o TIAs.  Referral to Cardiology for unmanaged (supraventricular?) tachycardia.  Had R CTS injection with Dr. Segundo, still painful, likely needs surgical release.  RTC in 3 months for f/u.

## 2023-10-20 ENCOUNTER — OFFICE VISIT (OUTPATIENT)
Dept: ORTHOPEDIC SURGERY | Facility: CLINIC | Age: 38
End: 2023-10-20
Payer: MEDICAID

## 2023-10-20 VITALS — WEIGHT: 160 LBS | BODY MASS INDEX: 28.35 KG/M2 | OXYGEN SATURATION: 97 % | HEIGHT: 63 IN | RESPIRATION RATE: 20 BRPM

## 2023-10-20 DIAGNOSIS — R29.898 WEAKNESS OF RIGHT UPPER EXTREMITY: Primary | ICD-10-CM

## 2023-10-20 PROCEDURE — 99214 OFFICE O/P EST MOD 30 MIN: CPT | Performed by: FAMILY MEDICINE

## 2023-10-20 NOTE — PROGRESS NOTES
Primary Care Sports Medicine Office Visit Note     Patient ID: Alyson Lew is a 38 y.o. female.    Chief Complaint:  Chief Complaint   Patient presents with    Right Arm - Follow-up, Pain     Pain- 10      HPI:    Ms. Alyson Lew is a 38 y.o. female. The patient presents to the clinic today for follow-up evaluation and electromyography results.    The patient reports that she saw Dr. Joseph Seipel whom informeed her that her electromyography did show carpal tunnel. She was also informed that it did not even show that she had fibromyalgia even with a fibromyalgia diagnosis for 10 years. She reports that Dr. Espinoza Rivero looked at it yesterday, 10/19/2023, and he told her that everything looks normal, but fibromyalgia would not be the test for that. She notes that she is still experiencing a great deal of pain in her hands when releasing objects. She applied the new compounded pain cream and passed out last night, 10/19/2023. She notes that she was actually able to sleep last night, 10/19/2023; however, she also had taken the gabapentin and sleep medication. She is still experiencing a significant amount of pain in her right arm. Her right arm currently feels that it is on fire and/or pulsing with pain, and at the same time, her right arm is numb. She also has been able to sew with her brand new sewing machine. She could not do any of the fine motor skills that she needs to run a thread. She states that she had told Dr. Espinoza Rivero the same information she relayed today, 10/20/2023. She had been informed that she does not have fibromyalgia. She had been informed that there could be a compressed nerve in her neck. She describes that the pain radiated up her right arm one day and it has been difficult to straighten out her neck. She was informed that it could be something from the spinal nerve in the column that uses the nerve that goes down into her right shoulder.      Past Medical History:    Diagnosis Date    Allergic     Anemia     Ankle sprain     Many times throughout life    Anxiety     Arthritis     Arthritis of back     On and off most of my life but pain are more frequent    Arthritis of neck     Asthma     Cancer     cervical 2008    Chronic constipation     Chronic pain disorder     CTS (carpal tunnel syndrome) 2023    Reason for EMG    Depression     Disease of thyroid gland     Dislocation of finger     [facility]    Extremity pain     Fibromyalgia, primary     Fracture, clavicle     When i born    Fracture, finger      [facility]    Fracture, foot 2014    Headache     Hip arthrosis 10 years    Hx of migraines     Injury of back     Joint pain     Knee sprain     Fell in bathtub    Low back pain     Low back strain     2014    Migraine     Hemiplegic migraines    Neck pain     Peripheral neuropathy     PTSD (post-traumatic stress disorder)     Rheumatoid arthritis 2017    Shortness of breath     Stress fracture 2009    Stroke     Mini heat stroke    Tachycardia     Tear of meniscus of knee 2013    Wrist sprain        Past Surgical History:   Procedure Laterality Date    CARDIAC ABLATION  2020    CARDIAC CATHETERIZATION N/A 2019    Procedure: Coronary angiography;  Surgeon: Leo Fagan MD;  Location: [facility] CATH INVASIVE LOCATION;  Service: Cardiovascular    CARDIAC CATHETERIZATION Left 2019    Procedure: Cardiac Catheterization/Vascular Study;  Surgeon: Leo Fagan MD;  Location: [facility] CATH INVASIVE LOCATION;  Service: Cardiovascular    CARDIAC CATHETERIZATION N/A 2019    Procedure: Left ventriculography;  Surgeon: Leo Fagan MD;  Location: [facility] CATH INVASIVE LOCATION;  Service: Cardiovascular    CARDIAC ELECTROPHYSIOLOGY PROCEDURE N/A 2020    Procedure: EP/Ablation;  Surgeon: Luther Walden MD;  Location: [facility] CATH INVASIVE LOCATION;  Service: Cardiovascular;  Laterality: N/A;     SECTION      x2     "CHOLECYSTECTOMY      ENDOSCOPY      FOOT SURGERY  3 yrs ago    Repair tendon    GASTRIC SLEEVE LAPAROSCOPIC      HAND SURGERY      PERONEAL TENDON EXPLORATION Right 02/17/2020    Procedure: EXTENSOR HALLICUS LONGUS REPAIR AND NERVE DECOMPRESSION;  Surgeon: VALERIA Rajan DPM;  Location: [facility] MAIN OR;  Service: Podiatry;  Laterality: Right;    TRIGGER POINT INJECTION      Was done by Dr Rivero    TUBAL ABDOMINAL LIGATION         Family History   Problem Relation Age of Onset    No Known Problems Mother     Cancer Father     Coronary artery disease Father     Diabetes Father     Stroke Father     Heart attack Father     Heart disease Father     Transient ischemic attack Father     Migraines Father     Anesthesia problems Father         Had a hard time waking afterwards    Clotting disorder Father     Mental illness Brother     Mental illness Son     Migraines Maternal Aunt     Seizures Maternal Aunt     Stroke Maternal Aunt     Migraines Paternal Grandmother     Rheumatologic disease Paternal Aunt      Social History     Occupational History    Not on file   Tobacco Use    Smoking status: Never     Passive exposure: Yes    Smokeless tobacco: Never    Tobacco comments:     Went to a few puffs a day after fathers pasing   Vaping Use    Vaping Use: Every day    Substances: Nicotine, Flavoring    Devices: Pre-filled or refillable cartridge    Passive vaping exposure: Yes   Substance and Sexual Activity    Alcohol use: No    Drug use: No    Sexual activity: Yes     Partners: Male     Birth control/protection: Tubal ligation     Comment: Been with Carmen for 10 yrs      Review of Systems   Constitutional:  Negative for activity change and fever.   Musculoskeletal:  Positive for arthralgias.   Skin:  Negative for color change and rash.   Neurological:  Positive for weakness and numbness.     Objective:    Resp 20   Ht 160 cm (63\")   Wt 72.6 kg (160 lb)   SpO2 97%   BMI 28.34 kg/m²     Physical " Examination:  Physical Exam  Vitals and nursing note reviewed.   Constitutional:       General: She is not in acute distress.     Appearance: She is well-developed. She is not diaphoretic.   HENT:      Head: Normocephalic and atraumatic.   Eyes:      Conjunctiva/sclera: Conjunctivae normal.   Pulmonary:      Effort: Pulmonary effort is normal. No respiratory distress.   Skin:     General: Skin is warm.      Capillary Refill: Capillary refill takes less than 2 seconds.   Neurological:      Mental Status: She is alert.       Right Hand Exam     Comments:  Right upper extremity examination reveals considerable weakness to relatively all large muscle groups of right upper extremity; this could be poor effort. There is pain with 3/5 strength of wrist pronation, wrist supination, elbow flexion, elbow extension, and lateral abduction of the shoulder. There is exquisite tenderness to palpation of the soft tissues of the upper arm, out of proportion to examination.        Imaging and other tests:  Electromyography/nerve conduction study of the right upper extremity was reviewed today, 10/20/2023; negative for any pathologic nerve conduction.    Assessment and Plan:    Right upper extremity weakness    I discussed pathology and treatment options with the patient today, 10/20/2023. She has considerable fibromyalgia, which, ultimately, I feel, is the root of her problem today, 10/20/2023. We discussed topical compounded pain cream for this issue as needed 3 times daily. Electromyography as above is essentially normal. I discussed with the patient today that I do not feel ultimately her problem is coming from her shoulder, elbow, or wrist. There is not much more that we will be able to do for her here at this office. For that reason, we discussed Neurology referral for further evaluation. That was placed today. Return to clinic to me as needed.      Transcribed from ambient dictation for Markell Segundo II, DO by Millie  Santino.  10/20/23   14:35 EDT    Patient or patient representative verbalized consent to the visit recording.  I have personally performed the services described in this document as transcribed by the above individual, and it is both accurate and complete.    Disclaimer: Please note that areas of this note were completed with computer voice recognition software.  Quite often unanticipated grammatical, syntax, homophones, and other interpretive errors are inadvertently transcribed by the computer software. Please excuse any errors that have escaped final proofreading.

## 2023-11-28 DIAGNOSIS — G43.109 MIGRAINE EQUIVALENT SYNDROME: ICD-10-CM

## 2023-11-28 RX ORDER — SUMATRIPTAN 100 MG/1
TABLET, FILM COATED ORAL
Qty: 27 TABLET | Refills: 3 | OUTPATIENT
Start: 2023-11-28

## 2024-01-08 DIAGNOSIS — G43.109 MIGRAINE EQUIVALENT SYNDROME: ICD-10-CM

## 2024-01-08 RX ORDER — SUMATRIPTAN 100 MG/1
TABLET, FILM COATED ORAL
Qty: 27 TABLET | Refills: 3 | OUTPATIENT
Start: 2024-01-08

## 2024-01-09 DIAGNOSIS — G43.109 MIGRAINE EQUIVALENT SYNDROME: ICD-10-CM

## 2024-01-11 NOTE — PROGRESS NOTES
Chief Complaint  Migraine    Subjective          Alyson Lew presents to [facility] for MIGRAINES  History of Present Illness  Patient is here to f/u on migraines and to get surgery clearance for oral surgery. Her surgery is 3-, she will have it out patient due to her having seizures.       Migraines Medication-   Sumatriptan 100 mg TAKE 1 TABLET BY MOUTH AT ONSET OF migraine. MAY REPEAT DOSE ONCE AFTER 2 HOURS IF neede  Sumatriptan 20 mg/nasal   She is having 3-4 migraines a month    She takes Depakote  mg 2 bid she has had 1 or 2 seizures a month    She wakes confused and difficulty talking.   Feels a ticking sensation in brain    ====PREV. OV 10/27/22====  Patient is here to f/u headaches   she states headaches has been worse since last visit she states she currently has an headache today in office on the right side side of head going back and her imitrex is not helping , she also states she think she is having seizures, she states her last seizure like episode was about 2 days ago she states she woke up asking questions about things that didn't pertain to anything, speech and memory  was off.   She takes Depakote  mg 2 bid     Had spell that took 2.5 months to recover     More frequent seizures and headaches  Some seizure wo ha  Spells vary.   Body will lock up and can't move. With legs shaking, struggled to walk back to bedroom  In 15 to 20 min then fine but speech gets choppy, difficulty talking struggles to talk. Takes several days to recover to several months     Location of ha right side of head, pulsatinging dull pain  Some times can be on the other side.   Duration of ha usually about one day.   Frequency of ha one or two times per week  Then can go week or two wo a headache.      =====2021    Pt. has spells of confusion and difficulty talking. Disorientation. Weakness in arms, difficulty forming words.  Duration more than 24 hours, mostly back to normal  in 24 hours. First spell April 2021.  Last spell 3 weeks ago, and one or two other times. All spells about the same. Had ha after the spells, severe ha, bilateral, start in front and then the entire head. HA started several hours into the spells of confusion. Ha not improved with Excedrin, goes away with sleeping.      Once or twice per month has ha relieved with excedrin  MRI of brain April 2021 was normal      Once while watching TV  Unable to move for about one min. Not aware she had fallen asleep., not related to the above spells               Current Outpatient Medications:     albuterol sulfate  (90 Base) MCG/ACT inhaler, INHALE TWO (2) PUFFS BY MOUTH EVERY 4 HOURS AS NEEDED, Disp: 18 g, Rfl: 1    chlorhexidine (PERIDEX) 0.12 % solution, RINSE BY MOUTH WITH ONE (1) capful TWICE DAILY, Disp: , Rfl:     divalproex (Depakote ER) 250 MG 24 hr tablet, Take 3 tablets by mouth 2 (Two) Times a Day. Three per day for one week   Then take four per day., Disp: 540 tablet, Rfl: 3    doxepin (SINEquan) 10 MG capsule, Take 1 capsule by mouth At Night As Needed for Sleep., Disp: 30 capsule, Rfl: 2    gabapentin (NEURONTIN) 600 MG tablet, Take 1 tablet by mouth 4 (Four) Times a Day., Disp: 120 tablet, Rfl: 2     MG tablet, TAKE ONE (1) TABLET BY MOUTH EVERY 6 HOURS AS NEEDED FOR PAIN, Disp: , Rfl:     Ibuprofen 3 %, Gabapentin 10 %, Baclofen 2 %, lidocaine 4 %, Apply 1-2 g topically to the appropriate area as directed 3 (Three) to 4 (Four) times daily., Disp: 90 g, Rfl: 0    oxyCODONE ER (Xtampza ER) 27 MG capsule extended-release 12 hour , Take 1 capsule by mouth Every 12 (Twelve) Hours., Disp: 60 each, Rfl: 0    oxyCODONE ER (Xtampza ER) 27 MG capsule extended-release 12 hour , Take 1 capsule by mouth Every 12 (Twelve) Hours., Disp: 60 each, Rfl: 0    oxyCODONE ER (Xtampza ER) 27 MG capsule extended-release 12 hour , Take 1 capsule by mouth Every 12 (Twelve) Hours., Disp: 60 each, Rfl: 0     oxyCODONE-acetaminophen (Percocet)  MG per tablet, Take 1 tablet by mouth Every 4 (Four) Hours As Needed for Moderate Pain., Disp: 180 tablet, Rfl: 0    oxyCODONE-acetaminophen (Percocet)  MG per tablet, Take 1 tablet by mouth Every 6 (Six) Hours As Needed for Moderate Pain., Disp: 120 tablet, Rfl: 0    Pediatric Multiple Vit-C-FA (FLINSTONES GUMMIES OMEGA-3 DHA) chewable tablet, FLINSTONES GUMMIES OMEGA-3 DHA CHEW, Disp: , Rfl:     prochlorperazine (COMPAZINE) 10 MG tablet, Take 1 tablet by mouth Every 6 (Six) Hours As Needed., Disp: , Rfl:     sulfamethoxazole-trimethoprim (BACTRIM DS,SEPTRA DS) 800-160 MG per tablet, Take 1 tablet by mouth Every 12 (Twelve) Hours., Disp: , Rfl:     SUMAtriptan (IMITREX) 20 MG/ACT nasal spray, 1 spray into the nostril(s) as directed by provider Every 2 (Two) Hours As Needed for Migraine., Disp: 6 each, Rfl: 3    tiZANidine (ZANAFLEX) 4 MG tablet, Take 1 tablet by mouth Every 8 (Eight) Hours As Needed for Muscle Spasms., Disp: 90 tablet, Rfl: 11    levothyroxine (LEVO-T) 75 MCG tablet, Take 1 tablet by mouth Daily. (Patient not taking: Reported on 1/15/2024), Disp: 30 tablet, Rfl: 1    nitrofurantoin, macrocrystal-monohydrate, (MACROBID) 100 MG capsule, TAKE ONE (1) CAPSULE BY MOUTH TWICE DAILY (Patient not taking: Reported on 1/15/2024), Disp: , Rfl:     omeprazole (PrilOSEC) 40 MG capsule, Take 1 capsule by mouth Daily. (Patient not taking: Reported on 1/15/2024), Disp: 30 capsule, Rfl: 6    SUMAtriptan (IMITREX) 100 MG tablet, TAKE 1 TABLET BY MOUTH AT ONSET OF migraine. MAY REPEAT DOSE ONCE AFTER 2 HOURS IF needed, Disp: 27 tablet, Rfl: 3    Review of Systems   HENT:  Positive for dental problem.    Musculoskeletal:  Positive for back pain.   Neurological:  Positive for dizziness, seizures, syncope, speech difficulty, weakness, light-headedness and headaches.   Psychiatric/Behavioral:  Positive for confusion.    All other systems reviewed and are negative.    "      Objective:    Vital Signs:   /72 (BP Location: Left arm, Patient Position: Sitting)   Pulse 80   Ht 160 cm (63\")   Wt 77.1 kg (170 lb)   BMI 30.11 kg/m²     Physical Exam  Vitals reviewed.   Pulmonary:      Effort: Pulmonary effort is normal.   Neurological:      General: No focal deficit present.      Mental Status: She is alert and oriented to person, place, and time.   Psychiatric:         Mood and Affect: Mood normal.         Speech: Speech normal.        Result Review :                Neurologic Exam     Mental Status   Oriented to person, place, and time.   Attention: normal.   Speech: speech is normal   Level of consciousness: alert  Knowledge: good.         Assessment and Plan    Diagnoses and all orders for this visit:    1. Migraine equivalent syndrome (Primary)  -     SUMAtriptan (IMITREX) 20 MG/ACT nasal spray; 1 spray into the nostril(s) as directed by provider Every 2 (Two) Hours As Needed for Migraine.  Dispense: 6 each; Refill: 3  -     divalproex (Depakote ER) 250 MG 24 hr tablet; Take 3 tablets by mouth 2 (Two) Times a Day. Three per day for one week   Then take four per day.  Dispense: 540 tablet; Refill: 3     Continue Depakote increase to 750mg bid per day   Continue Imitrex tabs and nasal spray if needed.   Pt is okay for oral surgery.       Follow Up   Return in about 1 year (around 1/15/2025).  Patient was given instructions and counseling regarding her condition or for health maintenance advice. Please see specific information pulled into the AVS if appropriate.     This document has been electronically signed by Joseph Seipel, MD on January 15, 2024 09:50 EST      "

## 2024-01-15 ENCOUNTER — OFFICE VISIT (OUTPATIENT)
Dept: NEUROLOGY | Facility: CLINIC | Age: 39
End: 2024-01-15
Payer: MEDICAID

## 2024-01-15 VITALS
WEIGHT: 170 LBS | SYSTOLIC BLOOD PRESSURE: 111 MMHG | HEART RATE: 80 BPM | BODY MASS INDEX: 30.12 KG/M2 | HEIGHT: 63 IN | DIASTOLIC BLOOD PRESSURE: 72 MMHG

## 2024-01-15 DIAGNOSIS — G43.109 MIGRAINE EQUIVALENT SYNDROME: Primary | ICD-10-CM

## 2024-01-15 PROCEDURE — 1160F RVW MEDS BY RX/DR IN RCRD: CPT | Performed by: PSYCHIATRY & NEUROLOGY

## 2024-01-15 PROCEDURE — 99214 OFFICE O/P EST MOD 30 MIN: CPT | Performed by: PSYCHIATRY & NEUROLOGY

## 2024-01-15 PROCEDURE — 1159F MED LIST DOCD IN RCRD: CPT | Performed by: PSYCHIATRY & NEUROLOGY

## 2024-01-15 RX ORDER — SULFAMETHOXAZOLE AND TRIMETHOPRIM 800; 160 MG/1; MG/1
1 TABLET ORAL EVERY 12 HOURS SCHEDULED
COMMUNITY
Start: 2024-01-06

## 2024-01-15 RX ORDER — SUMATRIPTAN 100 MG/1
TABLET, FILM COATED ORAL
Qty: 27 TABLET | Refills: 3 | Status: SHIPPED | OUTPATIENT
Start: 2024-01-15

## 2024-01-15 RX ORDER — NITROFURANTOIN 25; 75 MG/1; MG/1
CAPSULE ORAL
COMMUNITY
Start: 2023-11-28

## 2024-01-15 RX ORDER — DIVALPROEX SODIUM 250 MG/1
750 TABLET, EXTENDED RELEASE ORAL 2 TIMES DAILY
Qty: 540 TABLET | Refills: 3 | Status: SHIPPED | OUTPATIENT
Start: 2024-01-15

## 2024-01-15 RX ORDER — DIVALPROEX SODIUM 250 MG/1
500 TABLET, EXTENDED RELEASE ORAL 2 TIMES DAILY
Qty: 360 TABLET | Refills: 3 | Status: SHIPPED | OUTPATIENT
Start: 2024-01-15 | End: 2024-01-15

## 2024-01-15 RX ORDER — IBUPROFEN 800 MG/1
TABLET ORAL
COMMUNITY
Start: 2023-12-07

## 2024-01-15 RX ORDER — SUMATRIPTAN 20 MG/1
1 SPRAY NASAL
Qty: 6 EACH | Refills: 3 | Status: SHIPPED | OUTPATIENT
Start: 2024-01-15

## 2024-01-15 RX ORDER — CHLORHEXIDINE GLUCONATE ORAL RINSE 1.2 MG/ML
SOLUTION DENTAL
COMMUNITY
Start: 2023-12-07

## 2024-01-17 ENCOUNTER — TELEPHONE (OUTPATIENT)
Dept: NEUROLOGY | Facility: CLINIC | Age: 39
End: 2024-01-17
Payer: MEDICAID

## 2024-01-17 NOTE — TELEPHONE ENCOUNTER
[facility] ([facility]) sent message about patient is having oral surgery and needs a clearance letter for her March 22, 2024.     Phone number [phone]   Fax [phone]

## 2024-01-25 ENCOUNTER — OFFICE VISIT (OUTPATIENT)
Dept: PAIN MEDICINE | Facility: CLINIC | Age: 39
End: 2024-01-25
Payer: MEDICAID

## 2024-01-25 VITALS
WEIGHT: 174 LBS | HEART RATE: 73 BPM | RESPIRATION RATE: 16 BRPM | OXYGEN SATURATION: 95 % | BODY MASS INDEX: 30.82 KG/M2

## 2024-01-25 DIAGNOSIS — M54.50 CHRONIC MIDLINE LOW BACK PAIN, UNSPECIFIED WHETHER SCIATICA PRESENT: Primary | ICD-10-CM

## 2024-01-25 DIAGNOSIS — M25.511 CHRONIC RIGHT SHOULDER PAIN: ICD-10-CM

## 2024-01-25 DIAGNOSIS — M54.16 LUMBAR RADICULOPATHY: ICD-10-CM

## 2024-01-25 DIAGNOSIS — M25.50 PAIN IN JOINT INVOLVING MULTIPLE SITES: ICD-10-CM

## 2024-01-25 DIAGNOSIS — G89.29 CHRONIC RIGHT SHOULDER PAIN: ICD-10-CM

## 2024-01-25 DIAGNOSIS — M79.601 PAIN IN RIGHT ARM: ICD-10-CM

## 2024-01-25 DIAGNOSIS — M46.1 SACROILIITIS, NOT ELSEWHERE CLASSIFIED: ICD-10-CM

## 2024-01-25 DIAGNOSIS — G89.29 NECK PAIN, CHRONIC: ICD-10-CM

## 2024-01-25 DIAGNOSIS — K59.03 DRUG-INDUCED CONSTIPATION: ICD-10-CM

## 2024-01-25 DIAGNOSIS — M79.7 FIBROMYALGIA: ICD-10-CM

## 2024-01-25 DIAGNOSIS — M54.2 NECK PAIN, CHRONIC: ICD-10-CM

## 2024-01-25 DIAGNOSIS — Z79.899 OTHER LONG TERM (CURRENT) DRUG THERAPY: ICD-10-CM

## 2024-01-25 DIAGNOSIS — G89.29 CHRONIC MIDLINE LOW BACK PAIN, UNSPECIFIED WHETHER SCIATICA PRESENT: Primary | ICD-10-CM

## 2024-01-25 RX ORDER — GABAPENTIN 600 MG/1
600 TABLET ORAL 4 TIMES DAILY
Qty: 120 TABLET | Refills: 5 | Status: SHIPPED | OUTPATIENT
Start: 2024-01-25

## 2024-01-25 RX ORDER — OXYCODONE 27 MG/1
1 CAPSULE, EXTENDED RELEASE ORAL EVERY 12 HOURS
Qty: 60 EACH | Refills: 0 | Status: SHIPPED | OUTPATIENT
Start: 2024-01-25

## 2024-01-25 NOTE — PROGRESS NOTES
"Subjective   Alyson Lew is a 38 y.o. female.     History of Present Illness  all over pain, especially back, neck and bilateral hip pain, right knee. Dz with fibromyalgia and inflammatory arthritis by Rheum, on DMARDs and Lyrica 100mg BID at initial visit with poor control, pain worsening, 10/10 at worst, 7/10 at best, always present, prevents work and housework, burning, sharp, varies. Prior notes reviewed, referred for worsening pain, does not have time for PT. Increased to Lyrica 100mg BID then TID, started Celebrex, Cymbalta 30mg then 60mg qHS, fewer flares but still severe fibromyalgia pain, also LBP and b/l hip pain. Started Norco 5/325mg, mostly taking qHS, some days BID with worsening pain with cold weather, becoming less effective, rotated to Percocet 5/325mg. C/o insomnia. Went to ED with severe pain not controlled with Percocet 5/325mg BID prn, now QID prn. Had gastric sleeve surgery, liquid Norco worked well, restarted Percocet with severe N/V, vomited up complete prescription. Started liquid Norco, working better, but pain worsening, having difficulty with ADLs, increased Norco and Lyrica, then MS-IR 15mg QID prn with pill . Worsening L \"hip\" pain, insomnia, constipation. Requesting NSAID. Had toni. Has FH of autoimmune disorders and MS, hasn't heard from Rheum. Needs PT before having injection, worsening SIJ pain even with PT, had b/l SIJ injections, helped, would like to repeat.    Also reports \"episodes\" where she loses the ability to speak c/w Broca's aphasia, resolves over 3-4 days spontaneously, had CT-brain in ED which was negative. Has h/o tachycardia, failed ablation, is not currently medicated.   Back Pain  Pertinent negatives include no abdominal pain, bladder incontinence, chest pain, fever, numbness or weakness.        The following portions of the patient's history were reviewed and updated as appropriate: allergies, current medications, past family history, past medical " history, past social history, past surgical history and problem list.    Review of Systems   Constitutional:  Negative for chills, fatigue and fever.   HENT:  Negative for hearing loss and trouble swallowing.    Eyes:  Positive for visual disturbance.   Respiratory:  Positive for shortness of breath.    Cardiovascular:  Negative for chest pain.   Gastrointestinal:  Positive for constipation. Negative for abdominal pain, diarrhea, nausea and vomiting.   Genitourinary:  Negative for urinary incontinence.   Musculoskeletal:  Positive for arthralgias, back pain and neck pain. Negative for joint swelling and myalgias.   Neurological:  Positive for headache. Negative for dizziness, weakness and numbness.       Objective   Physical Exam   Constitutional: She is oriented to person, place, and time. She appears well-developed and well-nourished.   HENT:   Head: Normocephalic and atraumatic.   Eyes: Pupils are equal, round, and reactive to light.   Cardiovascular: Normal rate, regular rhythm, normal heart sounds and normal pulses.   No tachycardia or irregularity to pulses today   Pulmonary/Chest: Effort normal and breath sounds normal.   Abdominal: Soft. Bowel sounds are normal. She exhibits no distension. There is no abdominal tenderness.   Musculoskeletal: Tenderness present.      Comments: positive gianni finger test, TTP over b/l SIJ, b/l CARINA, and Gaenslens   Neurological: She is alert and oriented to person, place, and time. She has normal reflexes. She displays normal reflexes. No sensory deficit.   Psychiatric: Her behavior is normal. Thought content normal.         Assessment & Plan   Diagnoses and all orders for this visit:    1. Chronic midline low back pain, unspecified whether sciatica present (Primary)    2. Neck pain, chronic    3. Drug-induced constipation    4. Fibromyalgia    5. Lumbar radiculopathy    6. Other long term (current) drug therapy    7. Pain in joint involving multiple sites    8. Pain in  right arm    9. Sacroiliitis, not elsewhere classified    10. Chronic right shoulder pain        INspect reviewed, in order. Low risk. Repeat 7/27/23 in order.  Increased to Lyrica 200mg TID, sedating, numbs her lips, but benefit outweighs side effects, not covered by insurance. Began Gabapentin 300mg TID, titrated, tolerating well, increased to 400mg TID, increased to 400mg QID, increased to 600mg QID prn.  Cont Cymbalta 60mg qHS, helping, and sleeping better.  Stopped Norco 5/325mg, failed Percocet 5/325mg, started liquid Norco 7.5/325mg/15ml 15ml QID prn, #1800. Increased to q4h prn, #2700, no longer working. Increased to MS-IR 15mg q6h prn,then MS-Contin 30mg BID, was helping a lot more but tolerant. Rotated to Percocet 10mg QID prn, then q4h prn. Used new refill at q4h then rotated to Xtampza 27mg BID. Filled 1/10/24.   Failed Mobic 7.5mg qd - BID prn, failed Celebrex.  Began Silenor, denied. Began Doxepin 10mg qHS prn.  Restart Tizanidine 4mg TID prn.  Cont Relistor 450mg qdaily, failed Miralax, colace.  Began RxAlt #2 compounded cream.  Ordered Flector BID prn.  New referral to Rheum.  Referral to PT for LBP and L hip pain.  Performed b/l SIJ injection. Repeat denied for no recent PT, but patient has failed PT in past.  Referral to Dr. Seiple for possible h/o TIAs.  Referral to Cardiology for unmanaged (supraventricular?) tachycardia.  Had R CTS injection with Dr. Segundo, still painful, likely needs surgical release.  RTC in 3 months for f/u.

## 2024-01-26 ENCOUNTER — HOSPITAL ENCOUNTER (OUTPATIENT)
Facility: HOSPITAL | Age: 39
Discharge: HOME | End: 2024-01-26
Attending: EMERGENCY MEDICINE | Admitting: EMERGENCY MEDICINE
Payer: MEDICAID

## 2024-01-26 VITALS
BODY MASS INDEX: 30.83 KG/M2 | SYSTOLIC BLOOD PRESSURE: 120 MMHG | HEIGHT: 63 IN | HEART RATE: 102 BPM | WEIGHT: 174 LBS | RESPIRATION RATE: 18 BRPM | DIASTOLIC BLOOD PRESSURE: 84 MMHG | OXYGEN SATURATION: 96 % | TEMPERATURE: 98.5 F

## 2024-01-26 DIAGNOSIS — N30.00 ACUTE CYSTITIS WITHOUT HEMATURIA: Primary | ICD-10-CM

## 2024-01-26 LAB
BACTERIA UR QL AUTO: ABNORMAL /HPF
BILIRUB UR QL STRIP: NEGATIVE
CLARITY UR: ABNORMAL
COLOR UR: YELLOW
GLUCOSE UR STRIP-MCNC: NEGATIVE MG/DL
HGB UR QL STRIP.AUTO: ABNORMAL
HOLD SPECIMEN: NORMAL
HYALINE CASTS UR QL AUTO: ABNORMAL /LPF
KETONES UR QL STRIP: NEGATIVE
LEUKOCYTE ESTERASE UR QL STRIP.AUTO: NEGATIVE
NITRITE UR QL STRIP: POSITIVE
PH UR STRIP.AUTO: 5.5 [PH] (ref 5–8)
PROT UR QL STRIP: NEGATIVE
RBC # UR STRIP: ABNORMAL /HPF
REF LAB TEST METHOD: ABNORMAL
SP GR UR STRIP: <=1.005 (ref 1–1.03)
SQUAMOUS #/AREA URNS HPF: ABNORMAL /HPF
UROBILINOGEN UR QL STRIP: ABNORMAL
WBC # UR STRIP: ABNORMAL /HPF

## 2024-01-26 PROCEDURE — G0463 HOSPITAL OUTPT CLINIC VISIT: HCPCS | Performed by: EMERGENCY MEDICINE

## 2024-01-26 PROCEDURE — 81001 URINALYSIS AUTO W/SCOPE: CPT | Performed by: EMERGENCY MEDICINE

## 2024-01-26 RX ORDER — SULFAMETHOXAZOLE AND TRIMETHOPRIM 800; 160 MG/1; MG/1
1 TABLET ORAL 2 TIMES DAILY
Qty: 14 TABLET | Refills: 0 | Status: SHIPPED | OUTPATIENT
Start: 2024-01-26 | End: 2024-02-02

## 2024-01-26 NOTE — FSED PROVIDER NOTE
Subjective   History of Present Illness  Urinary Tract Infection  Patient complains of burning with urination, foul smelling urine, and frequency. She has had symptoms for 2 weeks. Patient denies back pain, fever, vaginal discharge, and vomiting. Patient does have a history of recurrent UTI. Patient does not have a history of pyelonephritis.  She was recently treated with Bactrim from a telemedicine provider 2 weeks ago                         Review of Systems   All other systems reviewed and are negative.      Past Medical History:   Diagnosis Date    Allergic     Anemia     Ankle sprain     Many times throughout life    Anxiety     Arthritis     Arthritis of back     On and off most of my life but pain are more frequent    Arthritis of neck     Asthma     Cancer     cervical 2008    Chronic constipation     Chronic pain disorder     CTS (carpal tunnel syndrome) March 2023    Reason for EMG    Depression     Disease of thyroid gland     Dislocation of finger 2004    [facility]    Extremity pain     Fibromyalgia, primary     Fracture, clavicle     When i born    Fracture, finger     [facility]    Fracture, foot 2014    Fractures     Throughout various parts of my life    Headache     Hip arthrosis 10 years    Hx of migraines     Injury of back     Joint pain     Knee sprain 2013    Fell in bathtub    Low back pain     Low back strain     2014    Migraine 2021    Hemiplegic migraines    Neck pain     Peripheral neuropathy     Primary osteoarthritis 01/21/2020    PTSD (post-traumatic stress disorder)     Rheumatoid arthritis 2017    Shortness of breath     Stress fracture 2009    Stroke     Mini heat stroke    Tachycardia     Tear of meniscus of knee 2013    Wrist sprain        Allergies   Allergen Reactions    Bleach [Sodium Hypochlorite] Anaphylaxis    Adhesive Tape Hives    Latex Hives and Unknown - High Severity       Past Surgical History:   Procedure Laterality Date    CARDIAC ABLATION  02/11/2020     CARDIAC CATHETERIZATION N/A 2019    Procedure: Coronary angiography;  Surgeon: Leo Fagan MD;  Location: [facility] CATH INVASIVE LOCATION;  Service: Cardiovascular    CARDIAC CATHETERIZATION Left 2019    Procedure: Cardiac Catheterization/Vascular Study;  Surgeon: Leo Fagan MD;  Location: [facility] CATH INVASIVE LOCATION;  Service: Cardiovascular    CARDIAC CATHETERIZATION N/A 2019    Procedure: Left ventriculography;  Surgeon: Leo Fagan MD;  Location: [facility] CATH INVASIVE LOCATION;  Service: Cardiovascular    CARDIAC ELECTROPHYSIOLOGY PROCEDURE N/A 2020    Procedure: EP/Ablation;  Surgeon: Luther Walden MD;  Location: [facility] CATH INVASIVE LOCATION;  Service: Cardiovascular;  Laterality: N/A;     SECTION      x2    CHOLECYSTECTOMY      ENDOSCOPY      FOOT SURGERY  3 yrs ago    Repair tendon    GASTRIC SLEEVE LAPAROSCOPIC      HAND SURGERY      PERONEAL TENDON EXPLORATION Right 2020    Procedure: EXTENSOR HALLICUS LONGUS REPAIR AND NERVE DECOMPRESSION;  Surgeon: VALERIA Rajan DPM;  Location: [facility] MAIN OR;  Service: Podiatry;  Laterality: Right;    TRIGGER POINT INJECTION      Was done by Dr Rivero    TUBAL ABDOMINAL LIGATION         Family History   Problem Relation Age of Onset    No Known Problems Mother     Cancer Father     Coronary artery disease Father     Diabetes Father     Stroke Father     Heart attack Father     Heart disease Father     Transient ischemic attack Father     Migraines Father     Anesthesia problems Father         Had a hard time waking afterwards    Clotting disorder Father     Arthritis Father     Mental illness Brother     Mental illness Son     Migraines Maternal Aunt     Seizures Maternal Aunt     Stroke Maternal Aunt     Migraines Paternal Grandmother     Rheumatologic disease Paternal Aunt        Social History     Socioeconomic History    Marital status: Significant Other   Tobacco Use    Smoking status: Never     Passive  exposure: Yes    Smokeless tobacco: Never    Tobacco comments:     Went to a few puffs a day after fathers passing   Vaping Use    Vaping Use: Every day    Substances: Nicotine, Flavoring    Devices: Pre-filled or refillable cartridge    Passive vaping exposure: Yes   Substance and Sexual Activity    Alcohol use: No    Drug use: No    Sexual activity: Yes     Partners: Male     Birth control/protection: Tubal ligation     Comment: Been with Carmen for 10 yrs           Objective   Physical Exam  Constitutional:       Appearance: Normal appearance.   HENT:      Mouth/Throat:      Mouth: Mucous membranes are moist.   Eyes:      Conjunctiva/sclera: Conjunctivae normal.   Cardiovascular:      Rate and Rhythm: Normal rate and regular rhythm.   Pulmonary:      Effort: Pulmonary effort is normal.      Breath sounds: Normal breath sounds.   Abdominal:      Palpations: Abdomen is soft.      Tenderness: There is no abdominal tenderness.   Musculoskeletal:         General: No swelling or deformity.   Skin:     Findings: No rash.   Neurological:      General: No focal deficit present.      Mental Status: She is alert and oriented to person, place, and time.         Procedures           ED Course  ED Course as of 01/26/24 0941   Fri Jan 26, 2024   0938 Urinalysis With Culture If Indicated - Urine, Clean Catch(!)  Discussion with patient regarding findings.  She was prescribed Bactrim second course by telemedicine provider.  I advised her to continue taking the Bactrim a culture was sent and she is aware that therapy might need to be changed but for now Bactrim is suitable. [JM]      ED Course User Index  [JM] Devonte Archibald MD                                           Medical Decision Making  Acute urinary tract infection, recurrent.  Culture sent she is reporting significant frequency so we will attempt a short trial of Pyridium.  She will follow-up with a urologist given frequency of UTIs antibiotics prescribed    Problems  Addressed:  Acute cystitis without hematuria: complicated acute illness or injury    Amount and/or Complexity of Data Reviewed  Labs:  Decision-making details documented in ED Course.    Risk  Prescription drug management.        Final diagnoses:   Acute cystitis without hematuria       ED Disposition  ED Disposition       ED Disposition   Discharge    Condition   Stable    Comment   --               Michelle Narayanan MD  [address]  [address]  [place] [id number] [phone]    In 3 days  If symptoms worsen    [facility]  [address] [facility] [address]  [phone]  Schedule an appointment as soon as possible for a visit            Medication List        New Prescriptions      sulfamethoxazole-trimethoprim 800-160 MG per tablet  Commonly known as: BACTRIM DS,SEPTRA DS  Take 1 tablet by mouth 2 (Two) Times a Day for 7 days.               Where to Get Your Medications        These medications were sent to [facility] [place] - [address] - [phone] [facility] [phone]   [address] [place] [address]      Phone: [phone]   sulfamethoxazole-trimethoprim 800-160 MG per tablet

## 2024-01-30 ENCOUNTER — SPECIALTY PHARMACY (OUTPATIENT)
Dept: INFUSION THERAPY | Facility: HOSPITAL | Age: 39
End: 2024-01-30
Payer: MEDICAID

## 2024-01-30 DIAGNOSIS — G43.109 MIGRAINE EQUIVALENT SYNDROME: Primary | ICD-10-CM

## 2024-01-30 DIAGNOSIS — G43.119 INTRACTABLE MIGRAINE WITH AURA WITHOUT STATUS MIGRAINOSUS: ICD-10-CM

## 2024-01-30 RX ORDER — RIMEGEPANT SULFATE 75 MG/75MG
75 TABLET, ORALLY DISINTEGRATING ORAL AS NEEDED
Qty: 8 TABLET | Refills: 5 | Status: SHIPPED | OUTPATIENT
Start: 2024-01-30

## 2024-01-30 NOTE — PROGRESS NOTES
Specialty Pharmacy Refill Coordination Note     [employer] PA submitted 1/30/24 key [id number]     Bev Brian  Specialty Pharmacy Technician

## 2024-01-30 NOTE — PROGRESS NOTES
Specialty Pharmacy Refill Coordination Note     Alyson is a 38 y.o. female contacted today regarding refills of  1 specialty medication(s).    Reviewed and verified with patient:       Specialty medication(s) and dose(s) confirmed: yes        Delivery Questions      Flowsheet Row Most Recent Value   Delivery method Beeline   Delivery address verified with patient/caregiver? Yes   Delivery address Home   Number of medications in delivery 1   Medication(s) being filled and delivered Rimegepant Sulfate   Doses left of specialty medications New   Copay verified? Yes   Copay amount $0   Copay form of payment No copayment ($0)                   Follow-up: 25 day(s)     Bev Brian  Specialty Pharmacy Technician

## 2024-01-30 NOTE — PROGRESS NOTES
Specialty Pharmacy Refill Coordination Note       [employer]tec approved until 1/29/25     Bev Brian  Specialty Pharmacy Technician

## 2024-02-02 ENCOUNTER — SPECIALTY PHARMACY (OUTPATIENT)
Dept: INFUSION THERAPY | Facility: HOSPITAL | Age: 39
End: 2024-02-02
Payer: MEDICAID

## 2024-02-02 ENCOUNTER — PATIENT MESSAGE (OUTPATIENT)
Dept: PAIN MEDICINE | Facility: CLINIC | Age: 39
End: 2024-02-02
Payer: MEDICAID

## 2024-02-02 DIAGNOSIS — G89.29 CHRONIC MIDLINE LOW BACK PAIN, UNSPECIFIED WHETHER SCIATICA PRESENT: ICD-10-CM

## 2024-02-02 DIAGNOSIS — M54.50 CHRONIC MIDLINE LOW BACK PAIN, UNSPECIFIED WHETHER SCIATICA PRESENT: ICD-10-CM

## 2024-02-02 RX ORDER — OXYCODONE 27 MG/1
1 CAPSULE, EXTENDED RELEASE ORAL EVERY 12 HOURS
Qty: 60 EACH | Refills: 0 | Status: SHIPPED | OUTPATIENT
Start: 2024-02-02

## 2024-02-02 NOTE — PROGRESS NOTES
Specialty Pharmacy Patient Management Program  Neurology Initial Assessment     Alyson Lew is a 38 y.o. female with migraines seen by a Neurology provider and enrolled in the Neurology Patient Management program offered by [facility].  An initial outreach was conducted, including assessment of therapy appropriateness and specialty medication education for Nurtec 75mg. The patient was introduced to services offered by [facility], including: regular assessments, refill coordination, curbside pick-up or mail order delivery options, prior authorization maintenance, and financial assistance programs as applicable. The patient was also provided with contact information for the pharmacy team.     Insurance Coverage & Financial Support  [facility] - [employer]    Relevant Past Medical History and Comorbidities  Relevant medical history and concomitant health conditions were discussed with the patient. The patient's chart has been reviewed for relevant past medical history and comorbid health conditions and updated as necessary.   Past Medical History:   Diagnosis Date    Allergic     Anemia     Ankle sprain     Many times throughout life    Anxiety     Arthritis     Arthritis of back     On and off most of my life but pain are more frequent    Arthritis of neck     Asthma     Cancer     cervical 2008    Chronic constipation     Chronic pain disorder     CTS (carpal tunnel syndrome) March 2023    Reason for EMG    Depression     Disease of thyroid gland     [facility] [place]    Extremity pain     Fibromyalgia, primary     Fracture, clavicle     When i born    Fracture, finger     [facility]    Fracture, foot 2014    Fractures     Throughout various parts of my life    Headache     Hip arthrosis 10 years    Hx of migraines     Injury of back     Joint pain     Knee sprain 2013    Fell in bathtub    Low back pain     Low back strain      2014    Migraine 2021    Hemiplegic migraines    Neck pain     Peripheral neuropathy     Primary osteoarthritis 01/21/2020    PTSD (post-traumatic stress disorder)     Rheumatoid arthritis 2017    Shortness of breath     Stress fracture 2009    Stroke     Mini heat stroke    Tachycardia     Tear of meniscus of knee 2013    Wrist sprain      Social History     Socioeconomic History    Marital status: Significant Other   Tobacco Use    Smoking status: Never     Passive exposure: Yes    Smokeless tobacco: Never    Tobacco comments:     Went to a few puffs a day after fathers passing   Vaping Use    Vaping Use: Every day    Substances: Nicotine, Flavoring    Devices: Pre-filled or refillable cartridge    Passive vaping exposure: Yes   Substance and Sexual Activity    Alcohol use: No    Drug use: No    Sexual activity: Yes     Partners: Male     Birth control/protection: Tubal ligation     Comment: Been with Carmen for 10 yrs     Problem list reviewed by Raghav Gorman RPH on 2/2/2024 at 11:17 AM    Allergies  Known allergies and reactions were discussed with the patient. The patient's chart has been reviewed for  allergy information and updated as necessary.   Allergies   Allergen Reactions    Bleach [Sodium Hypochlorite] Anaphylaxis    Adhesive Tape Hives    Latex Hives and Unknown - High Severity     Allergies reviewed by Raghav Gorman RPH on 2/2/2024 at 11:12 AM    Relevant Laboratory Values      Lab Assessment  There are no recent comprehensive labs in [facility] to assess. She is being seen by multiple [facility] providers so she is receiving consistent care. I advised patient to obtain yearly labs for her own health records.     Current Medication List  This medication list has been reviewed with the patient and evaluated for any interactions or necessary modifications/recommendations, and updated to include all prescription medications, OTC medications, and supplements the patient is  currently taking.  This list reflects what is contained in the patient's profile, which has also been marked as reviewed to communicate to other providers it is the most up to date version of the patient's current medication therapy.     Current Outpatient Medications:     albuterol sulfate  (90 Base) MCG/ACT inhaler, INHALE TWO (2) PUFFS BY MOUTH EVERY 4 HOURS AS NEEDED, Disp: 18 g, Rfl: 1    chlorhexidine (PERIDEX) 0.12 % solution, RINSE BY MOUTH WITH ONE (1) capful TWICE DAILY, Disp: , Rfl:     divalproex (Depakote ER) 250 MG 24 hr tablet, Take 3 tablets by mouth 2 (Two) Times a Day. Three per day for one week   Then take four per day., Disp: 540 tablet, Rfl: 3    doxepin (SINEquan) 10 MG capsule, Take 1 capsule by mouth At Night As Needed for Sleep., Disp: 30 capsule, Rfl: 2    gabapentin (NEURONTIN) 600 MG tablet, Take 1 tablet by mouth 4 (Four) Times a Day., Disp: 120 tablet, Rfl: 5     MG tablet, TAKE ONE (1) TABLET BY MOUTH EVERY 6 HOURS AS NEEDED FOR PAIN, Disp: , Rfl:     Ibuprofen 3 %, Gabapentin 10 %, Baclofen 2 %, lidocaine 4 %, Apply 1-2 g topically to the appropriate area as directed 3 (Three) to 4 (Four) times daily., Disp: 90 g, Rfl: 0    omeprazole (PrilOSEC) 40 MG capsule, Take 1 capsule by mouth Daily., Disp: 30 capsule, Rfl: 6    oxyCODONE ER (Xtampza ER) 27 MG capsule extended-release 12 hour , Take 1 capsule by mouth Every 12 (Twelve) Hours., Disp: 60 each, Rfl: 0    Pediatric Multiple Vit-C-FA (FLINSTONES GUMMIES OMEGA-3 DHA) chewable tablet, FLINSTONES GUMMIES OMEGA-3 DHA CHEW, Disp: , Rfl:     Rimegepant Sulfate (Nurtec) 75 MG tablet dispersible tablet, Take 1 tablet by mouth As Needed migraine. max 1 tablet per 24 hours, Disp: 8 tablet, Rfl: 5    sulfamethoxazole-trimethoprim (BACTRIM DS,SEPTRA DS) 800-160 MG per tablet, Take 1 tablet by mouth 2 (Two) Times a Day for 7 days., Disp: 14 tablet, Rfl: 0    SUMAtriptan (IMITREX) 100 MG tablet, TAKE 1 TABLET BY MOUTH AT ONSET OF  migraine. MAY REPEAT DOSE ONCE AFTER 2 HOURS IF needed, Disp: 27 tablet, Rfl: 3    tiZANidine (ZANAFLEX) 4 MG tablet, Take 1 tablet by mouth Every 8 (Eight) Hours As Needed for Muscle Spasms., Disp: 90 tablet, Rfl: 11    levothyroxine (LEVO-T) 75 MCG tablet, Take 1 tablet by mouth Daily. (Patient not taking: Reported on 2/2/2024), Disp: 30 tablet, Rfl: 1    nitrofurantoin, macrocrystal-monohydrate, (MACROBID) 100 MG capsule, , Disp: , Rfl:     oxyCODONE ER (Xtampza ER) 27 MG capsule extended-release 12 hour , Take 1 capsule by mouth Every 12 (Twelve) Hours. (Patient not taking: Reported on 2/2/2024), Disp: 60 each, Rfl: 0    oxyCODONE ER (Xtampza ER) 27 MG capsule extended-release 12 hour , Take 1 capsule by mouth Every 12 (Twelve) Hours. (Patient not taking: Reported on 2/2/2024), Disp: 60 each, Rfl: 0    oxyCODONE-acetaminophen (Percocet)  MG per tablet, Take 1 tablet by mouth Every 4 (Four) Hours As Needed for Moderate Pain. (Patient not taking: Reported on 2/2/2024), Disp: 180 tablet, Rfl: 0    oxyCODONE-acetaminophen (Percocet)  MG per tablet, Take 1 tablet by mouth Every 6 (Six) Hours As Needed for Moderate Pain. (Patient not taking: Reported on 2/2/2024), Disp: 120 tablet, Rfl: 0    prochlorperazine (COMPAZINE) 10 MG tablet, Take 1 tablet by mouth Every 6 (Six) Hours As Needed. (Patient not taking: Reported on 2/2/2024), Disp: , Rfl:     Medicines reviewed by Raghav Gorman RP on 2/2/2024 at 11:16 AM  Medicines reviewed by Raghav Gorman RPH on 2/2/2024 at 11:16 AM    Drug Interactions  The patient's medication profile has been updated and reviewed. There are no direct interactions with Nurtec and no other significant interactions to address at this time. The patient has been instructed to notify our pharmacy if she plans to start any new medications or supplements.     Initial Education Provided for Specialty Medication  The patient has been provided with the  following education and any applicable administration techniques (i.e. self-injection) have been demonstrated for the therapies indicated. All questions and concerns have been addressed prior to the patient receiving the medication, and the patient has verbalized understanding of the education and any materials provided.  Additional patient education shall be provided and documented upon request by the patient, provider or payer.      Nurtec (rimegepant) 75 mg ODT, 1 tablet by mouth daily as needed for headache symptoms. Max 1 tablet over 24 hours.  Medication Expectations   Why am I taking this medication? You are taking this medication to treat an acute migraine.   What should I expect while on this medication? You should expect to see a decrease in the frequency and severity of your migraines.   How does the medication work? Nurtec is a small molecule that binds to calcitonin gene-related peptide (CGRP) and blocks its binding to the receptor decreasing the severity of migraines.   How long will I be on this medication for? The amount of time you will be on this medication will be determined by your doctor and your response to the medication.    How do I take this medication? Take as directed on your prescription label.   What are some possible side effects? Potential side effects including, but not limited to nausea. Patient verbalized understanding.   What happens if I miss a dose? Take as soon as needed for headache or migraine symptoms.     Medication Safety   What are things I should warn my doctor immediately about? Hypersensitivity reactions - trouble breathing or swallowing.   What are things that I should be cautious of? Hypersensitivity reactions (eg, dyspnea, rash), including delayed serious reactions, have occurred; discontinue use if suspected    What are some medications that can interact with this one? Avoid concomitant administration of Nurtec ODT with strong inhibitors of CY, strong or  moderate inducers of CYP3A or inhibitors of P-gp or BCRP. Avoid another dose of Nurtec ODT within 48 hours when it is administered with moderate inhibitors of CY. Ask your pharmacist or health care provider before starting new medications     Medication Storage/Handling   How should I handle this medication? Keep this medication out of reach of pets/children in original container. Ensure hands are dry before opening blister pack.   How does this medication need to be stored? Store at room temperature away from heat/cold, sunlight or moisture   How should I dispose of this medication? There should not be a need to dispose of this medication unless your provider decides to change the dose or therapy. If that is the case, take to your local police station for proper disposal. Some pharmacies also have take-back bins for medication drop-off.      Resources/Support   How can I remind myself to take this medication? You can download reminder apps to help you manage your refills. You may also set an alarm on your phone to remind you. The pharmacy carries pill boxes that you can place next to an area you pass everyday (such as where you place your car keys or where you charge your phone)   Is financial support available?  Yes, [employer] can provide co-pay cards if you have commercial insurance or patient assistance if you have Medicare or no insurance.    Which vaccines are recommended for me? Talk to your doctor about these vaccines: Flu, Coronavirus (COVID-19), Pneumococcal (pneumonia), Tdap, Hepatitis B, Zoster (shingles)       Enter Specific Medication SmartPhrase Here    Adherence and Self-Administration  Adherence related to the patient's specialty therapy was discussed with the patient. The Adherence segment of this outreach has been reviewed and updated.   Is there a concern with patient's ability to self administer the medication correctly and without issue?: No  Were any potential barriers to  adherence identified during the initial assessment or patient education?: No  Are there any concerns regarding the patient's understanding of the importance of medication adherence?: No  Methods for Supporting Patient Adherence and/or Self-Administration: Although the patient does not know when she is developing migraines at all times, we reviewed her prodrome symptoms and encouraged her not to wait until migraine symptoms progress.     Goals of Therapy  Goals related to the patient's specialty therapy were discussed with the patient. The Patient Goals segment of this outreach has been reviewed and updated.   Goals Addressed Today        Specialty Pharmacy General Goal      Patient reports up to 8 to 10 migraine days per month. Her migraines are often hemiplegic and some occur without her noticing until she wakes up.  She would be pleased with a 75% reduction in symptoms if taken in an appropriate timeframe.                Reassessment Plan & Follow-Up  Medication Therapy Changes: Per Dr. Seipel, patient to start Nurtec 75mg once as needed for migraine symptoms. Patient's questions regarding how to take ODT formulation were answered.   Related Plans, Therapy Recommendations, or Therapy Problems to Be Addressed: Will monitor the patient's response to Nurtec and will hopefully be able to stop Imitrex PRN. If she responds well to Nurtec but migraines increase in frequency, we may be able to change to QOD dosing.   Pharmacist to perform regular reassessments no more than (6) months from the previous assessment.  Care Coordinator to set up future refill outreaches, coordinate prescription delivery, and escalate clinical questions to pharmacist.   Welcome information and patient satisfaction survey to be sent by specialty pharmacy team with patient's initial fill.    Attestation  Therapeutic appropriateness: Appropriate   I attest the patient was actively involved in and has agreed to the above plan of care. If the  prescribed therapy is at any point deemed not appropriate based on the current or future assessments, a consultation will be initiated with the patient's specialty care provider to determine the best course of action. The revised plan of therapy will be documented along with any additional patient education provided. Discussed aforementioned material with patient by phone.    Tyrone Gorman, PharmD  Clinic Specialty Pharmacist, Neurology  2/2/2024  11:23 EST

## 2024-02-02 NOTE — TELEPHONE ENCOUNTER
From: Alyson Lew  To: Espinoza Rivero  Sent: 2/2/2024 4:16 PM EST  Subject: My Pharmacy closed last min as of this past Monday    I received attached letter today about [facility] closed down in [place] [address] very suddenly. In said letter it states that all my prescriptions have been transferred to [facility] ([place]) @ address of   [address] [place], [place] [address] [place] [address].   Their phone number is [phone]. My medicine that did not transfer is my XTEMPZA ER 27 MG. I have 6 days left of medication after tonight's dose. Any help in this matter would be appreciated. [facility] has stated they have my complete profile just not my pain medications.

## 2024-02-08 ENCOUNTER — PATIENT MESSAGE (OUTPATIENT)
Dept: PAIN MEDICINE | Facility: CLINIC | Age: 39
End: 2024-02-08
Payer: MEDICAID

## 2024-02-08 ENCOUNTER — TELEPHONE (OUTPATIENT)
Dept: PAIN MEDICINE | Facility: CLINIC | Age: 39
End: 2024-02-08
Payer: MEDICAID

## 2024-02-08 DIAGNOSIS — G89.29 CHRONIC MIDLINE LOW BACK PAIN, UNSPECIFIED WHETHER SCIATICA PRESENT: ICD-10-CM

## 2024-02-08 DIAGNOSIS — M54.50 CHRONIC MIDLINE LOW BACK PAIN, UNSPECIFIED WHETHER SCIATICA PRESENT: ICD-10-CM

## 2024-02-08 RX ORDER — OXYCODONE 27 MG/1
1 CAPSULE, EXTENDED RELEASE ORAL EVERY 12 HOURS
Qty: 60 EACH | Refills: 0 | Status: SHIPPED | OUTPATIENT
Start: 2024-02-08

## 2024-02-08 NOTE — TELEPHONE ENCOUNTER
"Provider:  DR.CHRISTIAN BRO    Caller:  BARBARA DE LEON    Relationship to Patient: SELF    [phone]    Phone Number: [phone]    Reason for Call:  PATIENT WANTS TO SPEAK WITH SOMEONE ABOUT A PROBLEM GETTING \"3\" MEDICATIONS SENT TO NEW PHARMACY. PATIENT SAYS SHE RUNS OUT OF RX TONIGHT.   PATIENT ALSO SENT MY CHART MESSAGE TODAY.    When was the patient last seen:  1/25/24    "

## 2024-02-08 NOTE — TELEPHONE ENCOUNTER
From: Alyson Lew  To: Espinoza Rivero  Sent: 2/8/2024 12:04 PM EST  Subject: Needing help with new Pharmacy     Candis, I ao apologize ahead of time but I'm having some difficulties with my new pharmacy [facility] here in [place]. When I finally got a hold of them  (please be mindful it took me all day yesterday of no answers and then calling the Main store management todayfor them to be told to answer phones.) They are informing me that they don't have 3 scripts for me just the one in April. I don't doubt that you all haven't sent my 3 months worth of scripts. I believe they are trying to pull the wool over my eyes so to speak. I really need this matter cleared up as soon as possible due to I have only one does left. That will be tonight's dose. I won't have anymore medicine afterwards.

## 2024-02-26 ENCOUNTER — SPECIALTY PHARMACY (OUTPATIENT)
Dept: NEUROLOGY | Facility: CLINIC | Age: 39
End: 2024-02-26
Payer: MEDICAID

## 2024-02-26 NOTE — PROGRESS NOTES
Specialty Pharmacy Refill Coordination Note     Alyson is a 38 y.o. female contacted today regarding refills of her specialty medication(s).    Specialty medication(s) and dose(s) confirmed: rimegepant 75 mg by mouth daily PRN migraines  Changes to medications: no  Changes to insurance: no      Refill Questions      Flowsheet Row Most Recent Value   Changes to allergies? No   Changes to medications? No   New conditions or infections since last clinic visit No   Unplanned office visit, urgent care, ED, or hospital admission in the last 4 weeks  No   How does patient/caregiver feel medication is working? Very good   Financial problems or insurance changes  No   Since the previous refill, were any specialty medication doses or scheduled injections missed or delayed?  No   Does this patient require a clinical escalation to a pharmacist? No            Delivery Questions      Flowsheet Row Most Recent Value   Delivery method Beeline   Delivery address verified with patient/caregiver? Yes   Delivery address Home   Number of medications in delivery 1   Medication(s) being filled and delivered Rimegepant Sulfate   Copay verified? Yes   Copay amount $0 last fill and consent obtained for potential copay differences   Copay form of payment No copayment ($0)                 Follow-up: 3 weeks     Flo Shay RPH  2/26/2024   15:31 EST

## 2024-03-21 ENCOUNTER — TELEPHONE (OUTPATIENT)
Dept: NEUROLOGY | Facility: CLINIC | Age: 39
End: 2024-03-21
Payer: MEDICAID

## 2024-03-21 NOTE — TELEPHONE ENCOUNTER
Caller: Alyson Lew    Relationship: Self    Best call back number: [phone]    What was the call regarding: PT SCHEDULED FOR DENTAL SURGERY (WISDOM TEETH REMOVAL) TOMORROW. DURING HER TRIAGING WITH THE DENTIST OFFICE, PT MADE THEM AWARE SHE HAD HAD A VERY SMALL SEIZURE DURING HER SLEEP TWO DAYS AGO; SHE WOKE UP WITH SOME BRAIN FOG THAT RESOLVED THAT SAME AFTERNOON. PT DOES NOT KNOW HOW LONG THE SEIZURE LASTED FOR. SHE DENIES HAVING MISSED ANY DOSAGES OF HER DEPAKOTE.    PT MENTIONS SHE HAS BEEN UNDER A LOT OF STRESS RECENTLY AND IS ANXIOUS FOR THE SURGERY AS SHE HAS BEEN WAITING SINCE JULY 2023.    DENTIST OFFICE IS NOW REQUIRING SURGERY CLEARANCE FROM NEURO OFFICE GIVEN HER RECENT SEIZURE. PT CONFIRMS SURGERY IS BEING COMPLETED WITH [facility].  PH [phone], FX [phone].    How long did the seizure last for?: PT IS UNSURE HOW LONG SEIZURE LASTED FOR    Has the patient missed any seizure-medication dosages?: NO MISSED DEPAKOTE DOSAGES    PLEASE REVIEW AND ADVISE.

## 2024-03-21 NOTE — TELEPHONE ENCOUNTER
Patient has been called and advised of this. She voiced understanding. Also called and spoke with Jimmy and she is aware as well. She asked that I email her directly with okay for surgery and email has been sent.

## 2024-03-21 NOTE — TELEPHONE ENCOUNTER
She has been taking depakote 250mg two tab bid    Take three tab at hs tonight and two tab  in the am before surgery

## 2024-03-21 NOTE — TELEPHONE ENCOUNTER
"  Caller: [facility].  FX [phone].    \"ALEJANDRO\"  Best call back number: [phone]    Who are you requesting to speak with (clinical staff, provider,  specific staff member): CLINICAL      What was the call regarding: SHE STATE DUE TO RECENT SEIZURE THE PT NEEDS A NEW CLEARANCE, SHE STATES THE SURGERY IS SUPPOSE TO BE A 6:30AM EST 3-22-24 AND SHE NEEDS TO SPEAK TO A CLINICAL STAFF MEMBER IS POSSIBLE.        "

## 2024-04-01 ENCOUNTER — SPECIALTY PHARMACY (OUTPATIENT)
Dept: INFUSION THERAPY | Facility: HOSPITAL | Age: 39
End: 2024-04-01
Payer: MEDICAID

## 2024-04-01 NOTE — PROGRESS NOTES
Specialty Pharmacy Refill Coordination Note     Alyson is a 39 y.o. female contacted today regarding refills of  1 specialty medication(s).    Reviewed and verified with patient:       Specialty medication(s) and dose(s) confirmed: yes    Refill Questions      Flowsheet Row Most Recent Value   Changes to allergies? No   Changes to medications? No   New conditions or infections since last clinic visit No   Unplanned office visit, urgent care, ED, or hospital admission in the last 4 weeks  No   How does patient/caregiver feel medication is working? Excellent   Financial problems or insurance changes  No   Since the previous refill, were any specialty medication doses or scheduled injections missed or delayed?  No   Does this patient require a clinical escalation to a pharmacist? No            Delivery Questions      Flowsheet Row Most Recent Value   Delivery method Beeline   Delivery address verified with patient/caregiver? Yes   Delivery address Home   Number of medications in delivery 1   Medication(s) being filled and delivered Rimegepant Sulfate   Doses left of specialty medications 3 tab   Copay verified? Yes   Copay amount $0   Copay form of payment No copayment ($0)                   Follow-up: 25 day(s)     Bev Brian  Specialty Pharmacy Technician

## 2024-05-02 ENCOUNTER — OFFICE VISIT (OUTPATIENT)
Dept: PAIN MEDICINE | Facility: CLINIC | Age: 39
End: 2024-05-02
Payer: MEDICAID

## 2024-05-02 VITALS
OXYGEN SATURATION: 97 % | BODY MASS INDEX: 29.94 KG/M2 | HEART RATE: 59 BPM | SYSTOLIC BLOOD PRESSURE: 107 MMHG | DIASTOLIC BLOOD PRESSURE: 70 MMHG | RESPIRATION RATE: 16 BRPM | WEIGHT: 169 LBS

## 2024-05-02 DIAGNOSIS — M54.16 LUMBAR RADICULOPATHY: ICD-10-CM

## 2024-05-02 DIAGNOSIS — M54.50 CHRONIC MIDLINE LOW BACK PAIN, UNSPECIFIED WHETHER SCIATICA PRESENT: ICD-10-CM

## 2024-05-02 DIAGNOSIS — M79.7 FIBROMYALGIA: ICD-10-CM

## 2024-05-02 DIAGNOSIS — M46.1 SACROILIITIS, NOT ELSEWHERE CLASSIFIED: ICD-10-CM

## 2024-05-02 DIAGNOSIS — K59.03 DRUG-INDUCED CONSTIPATION: ICD-10-CM

## 2024-05-02 DIAGNOSIS — M25.511 CHRONIC RIGHT SHOULDER PAIN: ICD-10-CM

## 2024-05-02 DIAGNOSIS — Z79.899 HIGH RISK MEDICATION USE: Primary | ICD-10-CM

## 2024-05-02 DIAGNOSIS — G89.29 CHRONIC RIGHT SHOULDER PAIN: ICD-10-CM

## 2024-05-02 DIAGNOSIS — G89.29 CHRONIC MIDLINE LOW BACK PAIN, UNSPECIFIED WHETHER SCIATICA PRESENT: ICD-10-CM

## 2024-05-02 DIAGNOSIS — M25.50 PAIN IN JOINT INVOLVING MULTIPLE SITES: ICD-10-CM

## 2024-05-02 DIAGNOSIS — Z79.899 OTHER LONG TERM (CURRENT) DRUG THERAPY: ICD-10-CM

## 2024-05-02 DIAGNOSIS — M79.601 PAIN IN RIGHT ARM: ICD-10-CM

## 2024-05-02 DIAGNOSIS — M54.2 NECK PAIN, CHRONIC: ICD-10-CM

## 2024-05-02 DIAGNOSIS — G89.29 NECK PAIN, CHRONIC: ICD-10-CM

## 2024-05-02 RX ORDER — TIZANIDINE 4 MG/1
4 TABLET ORAL EVERY 8 HOURS PRN
Qty: 90 TABLET | Refills: 11 | Status: SHIPPED | OUTPATIENT
Start: 2024-05-02

## 2024-05-02 RX ORDER — OXYCODONE 27 MG/1
1 CAPSULE, EXTENDED RELEASE ORAL EVERY 12 HOURS
Qty: 60 EACH | Refills: 0 | Status: SHIPPED | OUTPATIENT
Start: 2024-05-02

## 2024-05-02 RX ORDER — GABAPENTIN 600 MG/1
600 TABLET ORAL 4 TIMES DAILY
Qty: 120 TABLET | Refills: 5 | Status: SHIPPED | OUTPATIENT
Start: 2024-05-02

## 2024-05-02 NOTE — PROGRESS NOTES
"Subjective   Alyson Lew is a 39 y.o. female.     History of Present Illness  all over pain, especially back, neck and bilateral hip pain, right knee. Dz with fibromyalgia and inflammatory arthritis by Rheum, on DMARDs and Lyrica 100mg BID at initial visit with poor control, pain worsening, 10/10 at worst, 7/10 at best, always present, prevents work and housework, burning, sharp, varies. Prior notes reviewed, referred for worsening pain, does not have time for PT. Increased to Lyrica 100mg BID then TID, started Celebrex, Cymbalta 30mg then 60mg qHS, fewer flares but still severe fibromyalgia pain, also LBP and b/l hip pain. Started Norco 5/325mg, mostly taking qHS, some days BID with worsening pain with cold weather, becoming less effective, rotated to Percocet 5/325mg. C/o insomnia. Went to ED with severe pain not controlled with Percocet 5/325mg BID prn, now QID prn. Had gastric sleeve surgery, liquid Norco worked well, restarted Percocet with severe N/V, vomited up complete prescription. Started liquid Norco, working better, but pain worsening, having difficulty with ADLs, increased Norco and Lyrica, then MS-IR 15mg QID prn with pill . Worsening L \"hip\" pain, insomnia, constipation. Requesting NSAID. Had toni. Has FH of autoimmune disorders and MS, hasn't heard from Rheum. Needs PT before having injection, worsening SIJ pain even with PT, had b/l SIJ injections, helped, would like to repeat.    Also reports \"episodes\" where she loses the ability to speak c/w Broca's aphasia, resolves over 3-4 days spontaneously, had CT-brain in ED which was negative. Has h/o tachycardia, failed ablation, is not currently medicated.   Back Pain  Pertinent negatives include no abdominal pain, bladder incontinence, chest pain, fever, numbness or weakness.        The following portions of the patient's history were reviewed and updated as appropriate: allergies, current medications, past family history, past medical " history, past social history, past surgical history and problem list.    Review of Systems   Constitutional:  Negative for chills, fatigue and fever.   HENT:  Negative for hearing loss and trouble swallowing.    Eyes:  Positive for visual disturbance.   Respiratory:  Positive for shortness of breath.    Cardiovascular:  Negative for chest pain.   Gastrointestinal:  Positive for constipation. Negative for abdominal pain, diarrhea, nausea and vomiting.   Genitourinary:  Negative for urinary incontinence.   Musculoskeletal:  Positive for arthralgias, back pain and neck pain. Negative for joint swelling and myalgias.   Neurological:  Positive for headache. Negative for dizziness, weakness and numbness.       Objective   Physical Exam   Constitutional: She is oriented to person, place, and time. She appears well-developed and well-nourished.   HENT:   Head: Normocephalic and atraumatic.   Eyes: Pupils are equal, round, and reactive to light.   Cardiovascular: Normal rate, regular rhythm, normal heart sounds and normal pulses.   No tachycardia or irregularity to pulses today   Pulmonary/Chest: Effort normal and breath sounds normal.   Abdominal: Soft. Bowel sounds are normal. She exhibits no distension. There is no abdominal tenderness.   Musculoskeletal: Tenderness present.      Comments: positive gianni finger test, TTP over b/l SIJ, b/l CARINA, and Gaenslens   Neurological: She is alert and oriented to person, place, and time. She has normal reflexes. She displays normal reflexes. No sensory deficit.   Psychiatric: Her behavior is normal. Thought content normal.         Assessment & Plan   Diagnoses and all orders for this visit:    1. Chronic midline low back pain, unspecified whether sciatica present (Primary)    2. Lumbar radiculopathy    3. Neck pain, chronic    4. Drug-induced constipation    5. Fibromyalgia    6. Other long term (current) drug therapy    7. Pain in joint involving multiple sites    8. Pain in  right arm    9. Sacroiliitis, not elsewhere classified    10. Chronic right shoulder pain        INspect reviewed, in order. Low risk. Repeat 7/27/23 in order.  Increased to Lyrica 200mg TID, sedating, numbs her lips, but benefit outweighs side effects, not covered by insurance. Began Gabapentin 300mg TID, titrated, tolerating well, increased to 400mg TID, increased to 400mg QID, increased to 600mg QID prn.  Cont Cymbalta 60mg qHS, helping, and sleeping better.  Stopped Norco 5/325mg, failed Percocet 5/325mg, started liquid Norco 7.5/325mg/15ml 15ml QID prn, #1800. Increased to q4h prn, #2700, no longer working. Increased to MS-IR 15mg q6h prn,then MS-Contin 30mg BID, was helping a lot more but tolerant. Rotated to Percocet 10mg QID prn, then q4h prn. Used new refill at q4h then rotated to Xtampza 27mg BID. Filled 4/9/24.   Failed Mobic 7.5mg qd - BID prn, failed Celebrex.  Began Silenor, denied. Began Doxepin 10mg qHS prn.  Restart Tizanidine 4mg TID prn.  Cont Relistor 450mg qdaily, failed Miralax, colace.  Began RxAlt #2 compounded cream.  Ordered Flector BID prn.  New referral to Rheum.  Referral to PT for LBP and L hip pain.  Performed b/l SIJ injection. Repeat denied for no recent PT, but patient has failed PT in past.  Referral to Dr. Seiple for possible h/o TIAs.  Referral to Cardiology for unmanaged (supraventricular?) tachycardia.  Had R CTS injection with Dr. Segundo, still painful, likely needs surgical release.  RTC in 3 months for f/u.

## 2024-05-09 ENCOUNTER — TELEPHONE (OUTPATIENT)
Dept: PAIN MEDICINE | Facility: CLINIC | Age: 39
End: 2024-05-09
Payer: MEDICAID

## 2024-05-09 ENCOUNTER — PATIENT MESSAGE (OUTPATIENT)
Dept: PAIN MEDICINE | Facility: CLINIC | Age: 39
End: 2024-05-09
Payer: MEDICAID

## 2024-05-09 DIAGNOSIS — M54.50 CHRONIC MIDLINE LOW BACK PAIN, UNSPECIFIED WHETHER SCIATICA PRESENT: ICD-10-CM

## 2024-05-09 DIAGNOSIS — G89.29 CHRONIC MIDLINE LOW BACK PAIN, UNSPECIFIED WHETHER SCIATICA PRESENT: ICD-10-CM

## 2024-05-09 RX ORDER — OXYCODONE 27 MG/1
1 CAPSULE, EXTENDED RELEASE ORAL EVERY 12 HOURS
Qty: 60 EACH | Refills: 0 | Status: SHIPPED | OUTPATIENT
Start: 2024-05-09 | End: 2024-05-16 | Stop reason: SDUPTHER

## 2024-05-09 RX ORDER — OXYCODONE 27 MG/1
1 CAPSULE, EXTENDED RELEASE ORAL EVERY 12 HOURS
Qty: 60 EACH | Refills: 0 | Status: SHIPPED | OUTPATIENT
Start: 2024-05-09

## 2024-05-09 NOTE — TELEPHONE ENCOUNTER
Caller: Alyson Lew    Relationship: Self    Best call back number: [phone]    Requested Prescriptions:   XTAMPZA 27 MG    Pharmacy where request should be sent:    [facility] [place] [address] [facility]  Last office visit with prescribing clinician: 5/2/2024   Last telemedicine visit with prescribing clinician: Visit date not found   Next office visit with prescribing clinician: 8/1/2024     Additional details provided by patient: PT STATES THAT RX WAS SENT TO [facility] AND THEY TOLD PT THEY WERE UNABLE TO FILL RX. PT STATES THAT [facility]. PT STATES SHE HAS NO DOSES REMAINING AND NEEDS IT TO BE FILLED TODAY.     Does the patient have less than a 3 day supply:  [x] Yes  [] No    Would you like a call back once the refill request has been completed: [x] Yes [] No    If the office needs to give you a call back, can they leave a voicemail: [x] Yes [] No    Nehemias Sawyer   05/09/24 11:14 EDT

## 2024-05-16 RX ORDER — OXYCODONE 27 MG/1
1 CAPSULE, EXTENDED RELEASE ORAL EVERY 12 HOURS
Qty: 30 EACH | Refills: 0 | Status: SHIPPED | OUTPATIENT
Start: 2024-05-16

## 2024-05-16 NOTE — TELEPHONE ENCOUNTER
From: Alyson Lew  To: Espinoza Rivero  Sent: 5/9/2024 11:24 AM EDT  Subject: URGENT OUT OF MEDS DUE TO PHARMACY ISSUES    Spoke with [facility], they said to send medicine over and it should be fine. We did. Now the issue is they can't fill it because they don't get that medicine in stock. They stated they sent the prescription back to you. I took my last dose the morning at 11am. I do not have anymore doses for tonight. I don't have any other pharmacies to ask as [facility]. Please send my pain medication prescription to the [facility] I used before. This must be done before the close of your business day to ensure I get my medicine dosage for tonight after the close of budinedd

## 2024-05-21 ENCOUNTER — HOSPITAL ENCOUNTER (EMERGENCY)
Facility: HOSPITAL | Age: 39
Discharge: HOME | End: 2024-05-21
Attending: EMERGENCY MEDICINE
Payer: MEDICAID

## 2024-05-21 VITALS
DIASTOLIC BLOOD PRESSURE: 67 MMHG | TEMPERATURE: 99.8 F | WEIGHT: 160 LBS | OXYGEN SATURATION: 99 % | BODY MASS INDEX: 28.35 KG/M2 | HEART RATE: 59 BPM | HEIGHT: 63 IN | SYSTOLIC BLOOD PRESSURE: 112 MMHG | RESPIRATION RATE: 16 BRPM

## 2024-05-21 DIAGNOSIS — T21.22XA PARTIAL THICKNESS BURN OF ABDOMEN, INITIAL ENCOUNTER: Primary | ICD-10-CM

## 2024-05-21 LAB
ALBUMIN SERPL-MCNC: 4 G/DL (ref 3.5–5.2)
ALBUMIN/GLOB SERPL: 1.3 G/DL
ALP SERPL-CCNC: 35 U/L (ref 39–117)
ALT SERPL W P-5'-P-CCNC: 14 U/L (ref 1–33)
ANION GAP SERPL CALCULATED.3IONS-SCNC: 11.2 MMOL/L (ref 5–15)
AST SERPL-CCNC: 23 U/L (ref 1–32)
BASOPHILS # BLD AUTO: 0.04 10*3/MM3 (ref 0–0.2)
BASOPHILS NFR BLD AUTO: 0.5 % (ref 0–1.5)
BILIRUB SERPL-MCNC: 0.2 MG/DL (ref 0–1.2)
BUN SERPL-MCNC: 13 MG/DL (ref 6–20)
BUN/CREAT SERPL: 19.4 (ref 7–25)
CALCIUM SPEC-SCNC: 9.3 MG/DL (ref 8.6–10.5)
CHLORIDE SERPL-SCNC: 105 MMOL/L (ref 98–107)
CO2 SERPL-SCNC: 17.8 MMOL/L (ref 22–29)
CREAT SERPL-MCNC: 0.67 MG/DL (ref 0.57–1)
DEPRECATED RDW RBC AUTO: 51 FL (ref 37–54)
EGFRCR SERPLBLD CKD-EPI 2021: 114.9 ML/MIN/1.73
EOSINOPHIL # BLD AUTO: 0.12 10*3/MM3 (ref 0–0.4)
EOSINOPHIL NFR BLD AUTO: 1.5 % (ref 0.3–6.2)
ERYTHROCYTE [DISTWIDTH] IN BLOOD BY AUTOMATED COUNT: 17.1 % (ref 12.3–15.4)
GLOBULIN UR ELPH-MCNC: 3.1 GM/DL
GLUCOSE SERPL-MCNC: 111 MG/DL (ref 65–99)
HCT VFR BLD AUTO: 29.7 % (ref 34–46.6)
HGB BLD-MCNC: 9.1 G/DL (ref 12–15.9)
IMM GRANULOCYTES # BLD AUTO: 0.01 10*3/MM3 (ref 0–0.05)
IMM GRANULOCYTES NFR BLD AUTO: 0.1 % (ref 0–0.5)
LYMPHOCYTES # BLD AUTO: 2.92 10*3/MM3 (ref 0.7–3.1)
LYMPHOCYTES NFR BLD AUTO: 36.9 % (ref 19.6–45.3)
MCH RBC QN AUTO: 24.9 PG (ref 26.6–33)
MCHC RBC AUTO-ENTMCNC: 30.6 G/DL (ref 31.5–35.7)
MCV RBC AUTO: 81.1 FL (ref 79–97)
MONOCYTES # BLD AUTO: 0.94 10*3/MM3 (ref 0.1–0.9)
MONOCYTES NFR BLD AUTO: 11.9 % (ref 5–12)
NEUTROPHILS NFR BLD AUTO: 3.89 10*3/MM3 (ref 1.7–7)
NEUTROPHILS NFR BLD AUTO: 49.1 % (ref 42.7–76)
PLATELET # BLD AUTO: 318 10*3/MM3 (ref 140–450)
PMV BLD AUTO: 10 FL (ref 6–12)
POTASSIUM SERPL-SCNC: 4.5 MMOL/L (ref 3.5–5.2)
PROT SERPL-MCNC: 7.1 G/DL (ref 6–8.5)
RBC # BLD AUTO: 3.66 10*6/MM3 (ref 3.77–5.28)
SODIUM SERPL-SCNC: 134 MMOL/L (ref 136–145)
WBC NRBC COR # BLD AUTO: 7.92 10*3/MM3 (ref 3.4–10.8)

## 2024-05-21 PROCEDURE — 85025 COMPLETE CBC W/AUTO DIFF WBC: CPT | Performed by: EMERGENCY MEDICINE

## 2024-05-21 PROCEDURE — 16025 DRESS/DEBRID P-THICK BURN M: CPT | Performed by: EMERGENCY MEDICINE

## 2024-05-21 PROCEDURE — 25010000002 KETOROLAC TROMETHAMINE PER 15 MG: Performed by: EMERGENCY MEDICINE

## 2024-05-21 PROCEDURE — 96374 THER/PROPH/DIAG INJ IV PUSH: CPT

## 2024-05-21 PROCEDURE — 80053 COMPREHEN METABOLIC PANEL: CPT | Performed by: EMERGENCY MEDICINE

## 2024-05-21 PROCEDURE — 90471 IMMUNIZATION ADMIN: CPT | Performed by: EMERGENCY MEDICINE

## 2024-05-21 PROCEDURE — 25010000002 ONDANSETRON PER 1 MG: Performed by: EMERGENCY MEDICINE

## 2024-05-21 PROCEDURE — 96361 HYDRATE IV INFUSION ADD-ON: CPT

## 2024-05-21 PROCEDURE — 36415 COLL VENOUS BLD VENIPUNCTURE: CPT

## 2024-05-21 PROCEDURE — 90715 TDAP VACCINE 7 YRS/> IM: CPT | Performed by: EMERGENCY MEDICINE

## 2024-05-21 PROCEDURE — 25010000002 TETANUS-DIPHTH-ACELL PERTUSSIS 5-2.5-18.5 LF-MCG/0.5 SUSPENSION PREFILLED SYRINGE: Performed by: EMERGENCY MEDICINE

## 2024-05-21 PROCEDURE — 96375 TX/PRO/DX INJ NEW DRUG ADDON: CPT

## 2024-05-21 PROCEDURE — 25010000002 HYDROMORPHONE 1 MG/ML SOLUTION: Performed by: EMERGENCY MEDICINE

## 2024-05-21 PROCEDURE — 25810000003 SODIUM CHLORIDE 0.9 % SOLUTION: Performed by: EMERGENCY MEDICINE

## 2024-05-21 PROCEDURE — 99283 EMERGENCY DEPT VISIT LOW MDM: CPT

## 2024-05-21 PROCEDURE — 25010000002 MORPHINE PER 10 MG: Performed by: EMERGENCY MEDICINE

## 2024-05-21 PROCEDURE — 99284 EMERGENCY DEPT VISIT MOD MDM: CPT | Performed by: EMERGENCY MEDICINE

## 2024-05-21 RX ORDER — KETOROLAC TROMETHAMINE 30 MG/ML
15 INJECTION, SOLUTION INTRAMUSCULAR; INTRAVENOUS ONCE
Status: COMPLETED | OUTPATIENT
Start: 2024-05-21 | End: 2024-05-21

## 2024-05-21 RX ORDER — MORPHINE SULFATE 2 MG/ML
2 INJECTION, SOLUTION INTRAMUSCULAR; INTRAVENOUS ONCE
Status: COMPLETED | OUTPATIENT
Start: 2024-05-21 | End: 2024-05-21

## 2024-05-21 RX ORDER — GINSENG 100 MG
1 CAPSULE ORAL 2 TIMES DAILY
Qty: 453.9 G | Refills: 0 | Status: SHIPPED | OUTPATIENT
Start: 2024-05-21

## 2024-05-21 RX ORDER — CEPHALEXIN 500 MG/1
500 CAPSULE ORAL ONCE
Status: COMPLETED | OUTPATIENT
Start: 2024-05-21 | End: 2024-05-21

## 2024-05-21 RX ORDER — SODIUM CHLORIDE 0.9 % (FLUSH) 0.9 %
10 SYRINGE (ML) INJECTION AS NEEDED
Status: DISCONTINUED | OUTPATIENT
Start: 2024-05-21 | End: 2024-05-21 | Stop reason: HOSPADM

## 2024-05-21 RX ORDER — DIPHENHYDRAMINE HYDROCHLORIDE 50 MG/ML
12.5 INJECTION INTRAMUSCULAR; INTRAVENOUS ONCE
Status: DISCONTINUED | OUTPATIENT
Start: 2024-05-21 | End: 2024-05-21

## 2024-05-21 RX ORDER — METHYLPREDNISOLONE SODIUM SUCCINATE 125 MG/2ML
125 INJECTION, POWDER, LYOPHILIZED, FOR SOLUTION INTRAMUSCULAR; INTRAVENOUS ONCE
Status: DISCONTINUED | OUTPATIENT
Start: 2024-05-21 | End: 2024-05-21

## 2024-05-21 RX ORDER — ONDANSETRON 2 MG/ML
4 INJECTION INTRAMUSCULAR; INTRAVENOUS ONCE
Status: COMPLETED | OUTPATIENT
Start: 2024-05-21 | End: 2024-05-21

## 2024-05-21 RX ORDER — CEPHALEXIN 500 MG/1
500 CAPSULE ORAL 4 TIMES DAILY
Qty: 28 CAPSULE | Refills: 0 | Status: SHIPPED | OUTPATIENT
Start: 2024-05-21 | End: 2024-05-28

## 2024-05-21 RX ORDER — IBUPROFEN 600 MG/1
600 TABLET ORAL EVERY 8 HOURS PRN
Qty: 15 TABLET | Refills: 0 | Status: SHIPPED | OUTPATIENT
Start: 2024-05-21

## 2024-05-21 RX ADMIN — HYDROMORPHONE HYDROCHLORIDE 0.5 MG: 1 INJECTION, SOLUTION INTRAMUSCULAR; INTRAVENOUS; SUBCUTANEOUS at 05:25

## 2024-05-21 RX ADMIN — CEPHALEXIN 500 MG: 500 CAPSULE ORAL at 04:59

## 2024-05-21 RX ADMIN — SODIUM CHLORIDE 1000 ML: 9 INJECTION, SOLUTION INTRAVENOUS at 04:56

## 2024-05-21 RX ADMIN — HYDROMORPHONE HYDROCHLORIDE 1 MG: 1 INJECTION, SOLUTION INTRAMUSCULAR; INTRAVENOUS; SUBCUTANEOUS at 05:53

## 2024-05-21 RX ADMIN — ONDANSETRON 4 MG: 2 INJECTION INTRAMUSCULAR; INTRAVENOUS at 04:56

## 2024-05-21 RX ADMIN — KETOROLAC TROMETHAMINE 15 MG: 30 INJECTION, SOLUTION INTRAMUSCULAR at 05:37

## 2024-05-21 RX ADMIN — MORPHINE SULFATE 2 MG: 2 INJECTION, SOLUTION INTRAMUSCULAR; INTRAVENOUS at 04:56

## 2024-05-21 RX ADMIN — TETANUS TOXOID, REDUCED DIPHTHERIA TOXOID AND ACELLULAR PERTUSSIS VACCINE, ADSORBED 0.5 ML: 5; 2.5; 8; 8; 2.5 SUSPENSION INTRAMUSCULAR at 04:56

## 2024-05-21 NOTE — DISCHARGE INSTRUCTIONS
[facility]-up [facility] tomorrow. Call today for an appointment and directions. Keep wound clean. Change dressing daily. Take medication as prescribed. Watch for signs of infection. Return if problems.
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
## 2024-05-24 ENCOUNTER — DOCUMENTATION (OUTPATIENT)
Dept: PAIN MEDICINE | Facility: CLINIC | Age: 39
End: 2024-05-24
Payer: MEDICAID

## 2024-05-24 DIAGNOSIS — G89.29 CHRONIC MIDLINE LOW BACK PAIN, UNSPECIFIED WHETHER SCIATICA PRESENT: ICD-10-CM

## 2024-05-24 DIAGNOSIS — M54.50 CHRONIC MIDLINE LOW BACK PAIN, UNSPECIFIED WHETHER SCIATICA PRESENT: ICD-10-CM

## 2024-05-24 RX ORDER — OXYCODONE 27 MG/1
1 CAPSULE, EXTENDED RELEASE ORAL EVERY 12 HOURS
Qty: 30 EACH | Refills: 0 | Status: SHIPPED | OUTPATIENT
Start: 2024-05-24

## 2024-05-24 NOTE — PROGRESS NOTES
Pt called very angry that her medication had been sent to [facility] and not [facility].  States that the nurses knew that it was supposed to go to [facility] and sent it to [facility] anyway.  Requesting scrxipt sent ASAP to [facility] as she was out of meds.

## 2024-05-28 ENCOUNTER — TELEPHONE (OUTPATIENT)
Dept: PAIN MEDICINE | Facility: CLINIC | Age: 39
End: 2024-05-28
Payer: MEDICAID

## 2024-05-28 NOTE — TELEPHONE ENCOUNTER
Pt left message w/answering service:      Pts Dr: DR BRO  From: BARBARA DE LEON  ANI: [phone]  Pt Name:  : [birth year]  Msg:  HAS SECOND DEGREE BREWSTER, HER PAIN MEDS  GOT SENT TO WRONG PHARMACY, ITS SUPPOSE  TO GO TO [facility]Y

## 2024-05-29 ENCOUNTER — SPECIALTY PHARMACY (OUTPATIENT)
Dept: INFUSION THERAPY | Facility: HOSPITAL | Age: 39
End: 2024-05-29
Payer: MEDICAID
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
## 2024-06-08 ENCOUNTER — TELEPHONE (OUTPATIENT)
Dept: PAIN MEDICINE | Facility: CLINIC | Age: 39
End: 2024-06-08
Payer: MEDICAID

## 2024-06-08 DIAGNOSIS — M54.50 CHRONIC MIDLINE LOW BACK PAIN, UNSPECIFIED WHETHER SCIATICA PRESENT: Primary | ICD-10-CM

## 2024-06-08 DIAGNOSIS — M54.16 LUMBAR RADICULOPATHY: ICD-10-CM

## 2024-06-08 DIAGNOSIS — G89.29 CHRONIC MIDLINE LOW BACK PAIN, UNSPECIFIED WHETHER SCIATICA PRESENT: Primary | ICD-10-CM

## 2024-06-08 RX ORDER — OXYCODONE AND ACETAMINOPHEN 10; 325 MG/1; MG/1
1 TABLET ORAL EVERY 4 HOURS PRN
Qty: 12 TABLET | Refills: 0 | Status: SHIPPED | OUTPATIENT
Start: 2024-06-08 | End: 2024-06-10

## 2024-06-08 NOTE — TELEPHONE ENCOUNTER
Patient called emergency service line demanded to speak to physician on call because she is unable to fill her prescription medication Xtampza. As per patient, none of the pharmacies have Xtampza and she is requesting I send her something to get over the weekend. She is at [facility] pharmacy demanding to have medications sent. To avoid withdrawals due to being on high dose pain medications, percocet  mg for 12 tabs were sent to [facility] pharmacy.   Patient was reminded multiple times that we do not refill pain medications and this is an inappropriate use of emergency service line.     Navin Jerry DO  Pain Management   [facility]

## 2024-06-10 ENCOUNTER — TELEPHONE (OUTPATIENT)
Dept: PAIN MEDICINE | Facility: CLINIC | Age: 39
End: 2024-06-10
Payer: MEDICAID

## 2024-06-10 NOTE — TELEPHONE ENCOUNTER
[facility] in [place] called to let us know they will no longer fill any prescriptions for this her. She received her Xtampza, but the pharmacist stated while she was there she was very rude to the staff.

## 2024-06-17 DIAGNOSIS — G89.29 CHRONIC MIDLINE LOW BACK PAIN, UNSPECIFIED WHETHER SCIATICA PRESENT: ICD-10-CM

## 2024-06-17 DIAGNOSIS — M54.50 CHRONIC MIDLINE LOW BACK PAIN, UNSPECIFIED WHETHER SCIATICA PRESENT: ICD-10-CM

## 2024-06-17 RX ORDER — OXYCODONE 27 MG/1
1 CAPSULE, EXTENDED RELEASE ORAL EVERY 12 HOURS
Qty: 60 EACH | Refills: 0 | Status: SHIPPED | OUTPATIENT
Start: 2024-06-17

## 2024-06-17 NOTE — TELEPHONE ENCOUNTER
Caller: Alyson Lew    Relationship: Self    Best call back number:     Requested Prescriptions: [facility]     Pharmacy where request should be sent:  [id number] [address] [facility] [phone] [facility] [phone]  [phone]     Last office visit with prescribing clinician: 5/2/2024   Last telemedicine visit with prescribing clinician: Visit date not found   Next office visit with prescribing clinician: 8/1/2024     Does the patient have less than a 3 day supply:  [] Yes  [x] No    Would you like a call back once the refill request has been completed: [x] Yes [] No    If the office needs to give you a call back, can they leave a voicemail: [x] Yes [] No    Isabel Wolf Rep   06/17/24 15:53 EDT

## 2024-06-20 ENCOUNTER — SPECIALTY PHARMACY (OUTPATIENT)
Dept: INFUSION THERAPY | Facility: HOSPITAL | Age: 39
End: 2024-06-20
Payer: MEDICAID

## 2024-06-20 RX ORDER — RIMEGEPANT SULFATE 75 MG/75MG
75 TABLET, ORALLY DISINTEGRATING ORAL ONCE AS NEEDED
Qty: 8 TABLET | Refills: 5 | Status: SHIPPED | OUTPATIENT
Start: 2024-06-20 | End: 2024-06-24 | Stop reason: ALTCHOICE

## 2024-06-21 ENCOUNTER — SPECIALTY PHARMACY (OUTPATIENT)
Dept: INFUSION THERAPY | Facility: HOSPITAL | Age: 39
End: 2024-06-21
Payer: MEDICAID

## 2024-06-21 NOTE — PROGRESS NOTES
Specialty Pharmacy Refill Coordination Note     Patient states that Nurtec is not working, feels like her body got used to it. Patient has not tried Ubrelvy       Bev Brian  Specialty Pharmacy Technician

## 2024-06-24 ENCOUNTER — SPECIALTY PHARMACY (OUTPATIENT)
Dept: INFUSION THERAPY | Facility: HOSPITAL | Age: 39
End: 2024-06-24
Payer: MEDICAID

## 2024-07-11 ENCOUNTER — SPECIALTY PHARMACY (OUTPATIENT)
Dept: INFUSION THERAPY | Facility: HOSPITAL | Age: 39
End: 2024-07-11
Payer: MEDICAID

## 2024-07-11 NOTE — PROGRESS NOTES
Specialty Pharmacy Patient Management Program  Neurology Initial Assessment     Alyson Lew is a 39 y.o. female with migraines seen by a Neurology provider and enrolled in the Neurology Patient Management program offered by [facility].  An initial outreach was conducted, including assessment of therapy appropriateness and specialty medication education for Ubrelvy 100mg. The patient was introduced to services offered by [facility], including: regular assessments, refill coordination, curbside pick-up or mail order delivery options, prior authorization maintenance, and financial assistance programs as applicable. The patient was also provided with contact information for the pharmacy team.     Insurance [place]age & Financial Support  [place]    Relevant Past Medical History and Comorbidities  Relevant medical history and concomitant health conditions were discussed with the patient. The patient's chart has been reviewed for relevant past medical history and comorbid health conditions and updated as necessary.   Past Medical History:   Diagnosis Date    Allergic     Anemia     Ankle sprain     Many times throughout life    Anxiety     Arthritis     Arthritis of back     On and off most of my life but pain are more frequent    Arthritis of neck     Asthma     Cancer     cervical 2008    Chronic constipation     Chronic pain disorder     CTS (carpal tunnel syndrome) March 2023    Reason for EMG    Depression     Disease of thyroid gland     [facility]    Extremity pain     Fibromyalgia, primary     Fracture, clavicle     When i born    Fracture, finger     2004 [facility]    Fracture, foot 2014    Fractures     Throughout various parts of my life    Headache     Hip arthrosis 10 years    Hx of migraines     Injury of back     Joint pain     Knee sprain 2013    Fell in bathtub    Low back pain     Low back strain     2014     Migraine 2021    Hemiplegic migraines    Neck pain     Peripheral neuropathy     Primary osteoarthritis 01/21/2020    PTSD (post-traumatic stress disorder)     Rheumatoid arthritis 2017    Shortness of breath     Stress fracture 2009    Stroke     Mini heat stroke    Tachycardia     Tear of meniscus of knee 2013    Wrist sprain      Social History     Socioeconomic History    Marital status: Significant Other   Tobacco Use    Smoking status: Never     Passive exposure: Yes    Smokeless tobacco: Never    Tobacco comments:     Went to a few puffs a day after fathers passing   Vaping Use    Vaping status: Every Day    Substances: Nicotine, Flavoring    Devices: Pre-filled or refillable cartridge    Passive vaping exposure: Yes   Substance and Sexual Activity    Alcohol use: No    Drug use: No    Sexual activity: Yes     Partners: Male     Birth control/protection: Tubal ligation     Comment: Been with Ashleighattila for 11yrs     Problem list reviewed by Raghav Gorman RPH on 7/11/2024 at  1:15 PM    Allergies  Known allergies and reactions were discussed with the patient. The patient's chart has been reviewed for  allergy information and updated as necessary.   Allergies   Allergen Reactions    Bleach [Sodium Hypochlorite] Anaphylaxis    Adhesive Tape Hives    Latex Hives and Unknown - High Severity     Allergies reviewed by Raghav Gorman RPH on 7/11/2024 at  1:15 PM    Relevant Laboratory Values  Common labs          5/21/2024    04:46 5/21/2024    04:56   Common Labs   Glucose  111    BUN  13    Creatinine  0.67    Sodium  134    Potassium  4.5    Chloride  105    Calcium  9.3    Albumin  4.0    Total Bilirubin  0.2    Alkaline Phosphatase  35    AST (SGOT)  23    ALT (SGPT)  14    WBC 7.92     Hemoglobin 9.1     Hematocrit 29.7     Platelets 318         Lab Assessment  The above labs have been reviewed. No dose adjustments are needed for the specialty medication Ubrelvy based on the labs.      Current Medication List  This medication list has been reviewed with the patient and evaluated for any interactions or necessary modifications/recommendations, and updated to include all prescription medications, OTC medications, and supplements the patient is currently taking.  This list reflects what is contained in the patient's profile, which has also been marked as reviewed to communicate to other providers it is the most up to date version of the patient's current medication therapy.     Current Outpatient Medications:     albuterol sulfate  (90 Base) MCG/ACT inhaler, INHALE TWO (2) PUFFS BY MOUTH EVERY 4 HOURS AS NEEDED, Disp: 18 g, Rfl: 1    bacitracin 500 UNIT/GM ointment, Apply 1 Application topically to the appropriate area as directed 2 (Two) Times a Day., Disp: 453.9 g, Rfl: 0    chlorhexidine (PERIDEX) 0.12 % solution, RINSE BY MOUTH WITH ONE (1) capful TWICE DAILY, Disp: , Rfl:     divalproex (Depakote ER) 250 MG 24 hr tablet, Take 3 tablets by mouth 2 (Two) Times a Day. Three per day for one week   Then take four per day., Disp: 540 tablet, Rfl: 3    doxepin (SINEquan) 10 MG capsule, Take 1 capsule by mouth At Night As Needed for Sleep., Disp: 30 capsule, Rfl: 2    gabapentin (NEURONTIN) 600 MG tablet, Take 1 tablet by mouth 4 (Four) Times a Day., Disp: 120 tablet, Rfl: 5    ibuprofen (ADVIL,MOTRIN) 600 MG tablet, Take 1 tablet by mouth Every 8 (Eight) Hours As Needed (pain)., Disp: 15 tablet, Rfl: 0    Ibuprofen 3 %, Gabapentin 10 %, Baclofen 2 %, lidocaine 4 %, Apply 1-2 g topically to the appropriate area as directed 3 (Three) to 4 (Four) times daily., Disp: 90 g, Rfl: 0    levothyroxine (LEVO-T) 75 MCG tablet, Take 1 tablet by mouth Daily., Disp: 30 tablet, Rfl: 1    nitrofurantoin, macrocrystal-monohydrate, (MACROBID) 100 MG capsule, , Disp: , Rfl:     omeprazole (PrilOSEC) 40 MG capsule, Take 1 capsule by mouth Daily., Disp: 30 capsule, Rfl: 6    oxyCODONE ER (Xtampza ER) 27 MG  capsule extended-release 12 hour , Take 1 capsule by mouth Every 12 (Twelve) Hours., Disp: 60 each, Rfl: 0    oxyCODONE ER (Xtampza ER) 27 MG capsule extended-release 12 hour , Take 1 capsule by mouth Every 12 (Twelve) Hours., Disp: 30 each, Rfl: 0    oxyCODONE ER (Xtampza ER) 27 MG capsule extended-release 12 hour , Take 1 capsule by mouth Every 12 (Twelve) Hours., Disp: 60 each, Rfl: 0    oxyCODONE-acetaminophen (Percocet)  MG per tablet, Take 1 tablet by mouth Every 4 (Four) Hours As Needed for Moderate Pain., Disp: 180 tablet, Rfl: 0    oxyCODONE-acetaminophen (Percocet)  MG per tablet, Take 1 tablet by mouth Every 6 (Six) Hours As Needed for Moderate Pain., Disp: 120 tablet, Rfl: 0    Pediatric Multiple Vit-C-FA (FLINSTONES GUMMIES OMEGA-3 DHA) chewable tablet, FLINSTONES GUMMIES OMEGA-3 DHA CHEW, Disp: , Rfl:     prochlorperazine (COMPAZINE) 10 MG tablet, Take 1 tablet by mouth Every 6 (Six) Hours As Needed., Disp: , Rfl:     SUMAtriptan (IMITREX) 100 MG tablet, TAKE 1 TABLET BY MOUTH AT ONSET OF migraine. MAY REPEAT DOSE ONCE AFTER 2 HOURS IF needed, Disp: 27 tablet, Rfl: 3    tiZANidine (ZANAFLEX) 4 MG tablet, Take 1 tablet by mouth Every 8 (Eight) Hours As Needed for Muscle Spasms., Disp: 90 tablet, Rfl: 11    ubrogepant (Ubrelvy) 100 MG tablet, Take 1 tablet by mouth 1 (One) Time As Needed for Migraine. May repeat dose in two hours if symptoms persist. Max 200mg/day., Disp: 16 tablet, Rfl: 5    Medicines reviewed by Raghav Gorman [facility] on 7/11/2024 at  1:15 PM    Drug Interactions  The patient's medication profile has been reviewed for accuracy and assessed for interactions. There are currently no significant interactions to address at this time, however, the patient has been encouraged to notify our office if they plan to start any new medications or supplements.      Initial Education Provided for Specialty Medication  The patient has been provided with the following education  and any applicable administration techniques (i.e. self-injection) have been demonstrated for the therapies indicated. All questions and concerns have been addressed prior to the patient receiving the medication, and the patient has verbalized understanding of the education and any materials provided.  Additional patient education shall be provided and documented upon request by the patient, provider or payer.      Ubrelvy (Ubrogepant) 100 mg tablet, take 1 tablet by mouth as needed for headache symptoms. May repeat 1 tablet in 2 hours for continued symptoms. Max of 2 tablets over 24 hours.   Medication Expectations   Why am I taking this medication? You are taking this medication to treat acute migraines.   What should I expect while on this medication? You should expect to see a decrease in the frequency and severity of your migraines.   How does the medication work? Ubrelvy is a small molecule that binds to calcitonin gene-related peptide (CGRP) and blocks its binding to the receptor decreasing the severity of migraines.   How long will I be on this medication for? The amount of time you will be on this medication will be determined by your doctor and your response to the medication.    How do I take this medication? Take as directed on your prescription label. Reviewed plan for ubrogepant 100 mg (1 tablet) PO daily PRN; may repeat 1 tablet in 2 hours, if needed. Max dose per day is 200 mg over 24 hours.    What are some possible side effects? Potential side effects including, but not limited to nausea and somnolence. Patient verbalized understanding.   What happens if I miss a dose? This is taken as needed for migraines.     Medication Safety   What are things I should warn my doctor immediately about? Allergic reaction: Itching or hives, swelling in your face or hands, swelling or tingling in your mouth or throat, chest tightness, trouble breathing     What are things that I should be cautious of? Tell your  doctor if you are pregnant or breastfeeding, or if you have kidney disease or liver disease.   What are some medications that can interact with this one? Concomitant use of strong CY inhibitors, check with your pharmacist or provider before starting any new medications, avoid grapefruit juice.     Medication Storage/Handling   How should I handle this medication? Keep this medication out of reach of pets/children.   How does this medication need to be stored? Store at a controlled room temperature between 20 and 25 degrees C (68 and 77 degrees F), with excursions permitted between 15 and 30 degrees C (59 and 86 degrees F) away from direct sunlight and moisture.   How should I dispose of this medication? There should not be a need to dispose of this medication unless your provider decides to change the dose or therapy. If that is the case, take to your local police station for proper disposal. Some pharmacies also have take-back bins for medication drop-off.      Resources/Support   How can I remind myself to take this medication? This is taken as needed for migraines.   Is financial support available?  Yes, [employer] can provide co-pay cards if you have commercial insurance or patient assistance if you have Medicare or no insurance.    Which vaccines are recommended for me? Talk to your doctor about these vaccines: Flu, Coronavirus (COVID-19), Pneumococcal (pneumonia), Tdap, Hepatitis B, Zoster (shingles)       Enter Specific Medication SmartPhrase Here    Adherence and Self-Administration  Adherence related to the patient's specialty therapy was discussed with the patient. The Adherence segment of this outreach has been reviewed and updated.   Is there a concern with patient's ability to self administer the medication correctly and without issue?: No  Were any potential barriers to adherence identified during the initial assessment or patient education?: No  Are there any concerns regarding the  patient's understanding of the importance of medication adherence?: No  Methods for Supporting Patient Adherence and/or Self-Administration: patient has been educated on when to take Ubrelvy as well as the option for a second dose if symptoms do not resolve.     Goals of Therapy  Goals related to the patient's specialty therapy were discussed with the patient. The Patient Goals segment of this outreach has been reviewed and updated.   Goals Addressed Today        Specialty Pharmacy General Goal      Patient reports up to 8 to 10 migraine days per month. Her migraines are often hemiplegic and some occur without her noticing until she wakes up.  She would be pleased with a 75% reduction in symptoms if taken in an appropriate timeframe.     7/11 - patient did not see the desired symptom reduction for Nurtec and hopes for at least a 50% reduction in symptoms with the first dose of Ubrelvy.  The patient is also hopeful that the option of a second dose could help with more of her treatment resistant migraines.                Reassessment Plan & Follow-Up  Medication Therapy Changes: Per Dr. Seipel, patient is to discontinue Nurtec 75mg and initiate Ubrelvy 100mg as needed for migraine reduction with the option of a second dose in two hours.   Related Plans, Therapy Recommendations, or Therapy Problems to Be Addressed: Will continue to monitor the patient's response to Ubrelvy and attempt a quantity limit increase if possible. Will also work the patient to identify and reduce potential triggers.   Pharmacist to perform regular reassessments no more than (6) months from the previous assessment.  Care Coordinator to set up future refill outreaches, coordinate prescription delivery, and escalate clinical questions to pharmacist.   Welcome information and patient satisfaction survey to be sent by specialty pharmacy team with patient's initial fill.    Attestation  Therapeutic appropriateness: Appropriate   I attest the patient  was actively involved in and has agreed to the above plan of care. If the prescribed therapy is at any point deemed not appropriate based on the current or future assessments, a consultation will be initiated with the patient's specialty care provider to determine the best course of action. The revised plan of therapy will be documented along with any additional patient education provided. Discussed aforementioned material with patient by phone.    Tyrone Gorman, PharmD  Clinic Specialty Pharmacist, Neurology  7/11/2024  13:17 EDT

## 2024-07-11 NOTE — PROGRESS NOTES
Specialty Pharmacy Refill Coordination Note     Alyson is a 39 y.o. female contacted today regarding refills of her specialty medication(s).    Specialty medication(s) and dose(s) confirmed: ubrelvy 100mg  Changes to medications: no  Changes to insurance: no  Reviewed and verified with patient:             Delivery Questions      Flowsheet Row Most Recent Value   Delivery method Other (Comment)   Delivery address verified with patient/caregiver? Yes   Delivery address Home   Number of medications in delivery 1   Medication(s) being filled and delivered Ubrogepant   Doses left of specialty medications 0   Copay verified? Yes   Copay amount 0   Copay form of payment No copayment ($0)   Ship Date 7/11/24   Delivery Date 7/12/24   Signature Required Yes                 Follow-up: 25 day(s)       Raghav Gorman [facility]  7/11/2024   13:45 EDT

## 2024-08-01 ENCOUNTER — OFFICE VISIT (OUTPATIENT)
Dept: PAIN MEDICINE | Facility: CLINIC | Age: 39
End: 2024-08-01
Payer: MEDICAID

## 2024-08-01 VITALS
WEIGHT: 174 LBS | SYSTOLIC BLOOD PRESSURE: 106 MMHG | RESPIRATION RATE: 16 BRPM | HEART RATE: 100 BPM | OXYGEN SATURATION: 98 % | BODY MASS INDEX: 30.82 KG/M2 | DIASTOLIC BLOOD PRESSURE: 63 MMHG

## 2024-08-01 DIAGNOSIS — M54.16 LUMBAR RADICULOPATHY: ICD-10-CM

## 2024-08-01 DIAGNOSIS — M25.511 CHRONIC RIGHT SHOULDER PAIN: ICD-10-CM

## 2024-08-01 DIAGNOSIS — M79.601 PAIN IN RIGHT ARM: ICD-10-CM

## 2024-08-01 DIAGNOSIS — M54.50 CHRONIC MIDLINE LOW BACK PAIN, UNSPECIFIED WHETHER SCIATICA PRESENT: Primary | ICD-10-CM

## 2024-08-01 DIAGNOSIS — M54.2 NECK PAIN, CHRONIC: ICD-10-CM

## 2024-08-01 DIAGNOSIS — M25.50 PAIN IN JOINT INVOLVING MULTIPLE SITES: ICD-10-CM

## 2024-08-01 DIAGNOSIS — G89.29 CHRONIC RIGHT SHOULDER PAIN: ICD-10-CM

## 2024-08-01 DIAGNOSIS — Z79.899 OTHER LONG TERM (CURRENT) DRUG THERAPY: ICD-10-CM

## 2024-08-01 DIAGNOSIS — M79.7 FIBROMYALGIA: ICD-10-CM

## 2024-08-01 DIAGNOSIS — K59.03 DRUG-INDUCED CONSTIPATION: ICD-10-CM

## 2024-08-01 DIAGNOSIS — G89.29 CHRONIC MIDLINE LOW BACK PAIN, UNSPECIFIED WHETHER SCIATICA PRESENT: Primary | ICD-10-CM

## 2024-08-01 DIAGNOSIS — G89.29 NECK PAIN, CHRONIC: ICD-10-CM

## 2024-08-01 DIAGNOSIS — M46.1 SACROILIITIS, NOT ELSEWHERE CLASSIFIED: ICD-10-CM

## 2024-08-01 RX ORDER — DOXEPIN HYDROCHLORIDE 10 MG/1
10 CAPSULE ORAL NIGHTLY PRN
Qty: 30 CAPSULE | Refills: 5 | Status: SHIPPED | OUTPATIENT
Start: 2024-08-01

## 2024-08-01 RX ORDER — GABAPENTIN 600 MG/1
600 TABLET ORAL 4 TIMES DAILY
Qty: 120 TABLET | Refills: 5 | Status: SHIPPED | OUTPATIENT
Start: 2024-08-01

## 2024-08-01 RX ORDER — OXYCODONE 27 MG/1
1 CAPSULE, EXTENDED RELEASE ORAL EVERY 12 HOURS
Qty: 30 EACH | Refills: 0 | Status: SHIPPED | OUTPATIENT
Start: 2024-08-01

## 2024-08-01 RX ORDER — OXYCODONE 27 MG/1
1 CAPSULE, EXTENDED RELEASE ORAL EVERY 12 HOURS
Qty: 60 EACH | Refills: 0 | Status: SHIPPED | OUTPATIENT
Start: 2024-08-01

## 2024-08-01 RX ORDER — TIZANIDINE 4 MG/1
4 TABLET ORAL EVERY 8 HOURS PRN
Qty: 90 TABLET | Refills: 11 | Status: SHIPPED | OUTPATIENT
Start: 2024-08-01

## 2024-08-01 NOTE — PROGRESS NOTES
"Subjective   Alyson Lew is a 39 y.o. female.     History of Present Illness  all over pain, especially back, neck and bilateral hip pain, right knee. Dz with fibromyalgia and inflammatory arthritis by Rheum, on DMARDs and Lyrica 100mg BID at initial visit with poor control, pain worsening, 10/10 at worst, 7/10 at best, always present, prevents work and housework, burning, sharp, varies. Prior notes reviewed, referred for worsening pain, does not have time for PT. Increased to Lyrica 100mg BID then TID, started Celebrex, Cymbalta 30mg then 60mg qHS, fewer flares but still severe fibromyalgia pain, also LBP and b/l hip pain. Started Norco 5/325mg, mostly taking qHS, some days BID with worsening pain with cold weather, becoming less effective, rotated to Percocet 5/325mg. C/o insomnia. Went to ED with severe pain not controlled with Percocet 5/325mg BID prn, now QID prn. Had gastric sleeve surgery, liquid Norco worked well, restarted Percocet with severe N/V, vomited up complete prescription. Started liquid Norco, working better, but pain worsening, having difficulty with ADLs, increased Norco and Lyrica, then MS-IR 15mg QID prn with pill . Worsening L \"hip\" pain, insomnia, constipation. Requesting NSAID. Had toni. Has FH of autoimmune disorders and MS, hasn't heard from Rheum. Needs PT before having injection, worsening SIJ pain even with PT, had b/l SIJ injections, helped, would like to repeat.    Also reports \"episodes\" where she loses the ability to speak c/w Broca's aphasia, resolves over 3-4 days spontaneously, had CT-brain in ED which was negative. Has h/o tachycardia, failed ablation, is not currently medicated.   Back Pain  Pertinent negatives include no abdominal pain, bladder incontinence, chest pain, fever, numbness or weakness.   Fibromyalgia  Associated symptoms include arthralgias and neck pain. Pertinent negatives include no abdominal pain, chest pain, chills, fatigue, fever, joint " swelling, myalgias, nausea, numbness, vomiting or weakness.   Arthritis  She reports no joint swelling. Pertinent negatives include no diarrhea, fatigue or fever.        The following portions of the patient's history were reviewed and updated as appropriate: allergies, current medications, past family history, past medical history, past social history, past surgical history and problem list.    Review of Systems   Constitutional:  Negative for chills, fatigue and fever.   HENT:  Negative for hearing loss and trouble swallowing.    Eyes:  Positive for visual disturbance.   Respiratory:  Positive for shortness of breath.    Cardiovascular:  Negative for chest pain.   Gastrointestinal:  Positive for constipation. Negative for abdominal pain, diarrhea, nausea and vomiting.   Genitourinary:  Negative for urinary incontinence.   Musculoskeletal:  Positive for arthralgias, arthritis, back pain and neck pain. Negative for joint swelling and myalgias.   Neurological:  Positive for headache. Negative for dizziness, weakness and numbness.       Objective   Physical Exam   Constitutional: She is oriented to person, place, and time. She appears well-developed and well-nourished.   HENT:   Head: Normocephalic and atraumatic.   Eyes: Pupils are equal, round, and reactive to light.   Cardiovascular: Normal rate, regular rhythm, normal heart sounds and normal pulses.   No tachycardia or irregularity to pulses today   Pulmonary/Chest: Effort normal and breath sounds normal.   Abdominal: Soft. Bowel sounds are normal. She exhibits no distension. There is no abdominal tenderness.   Musculoskeletal: Tenderness present.      Comments: positive gianni finger test, TTP over b/l SIJ, b/l CARINA, and Gaenslens   Neurological: She is alert and oriented to person, place, and time. She has normal reflexes. She displays normal reflexes. No sensory deficit.   Psychiatric: Her behavior is normal. Thought content normal.         Assessment & Plan    Diagnoses and all orders for this visit:    1. Chronic midline low back pain, unspecified whether sciatica present (Primary)    2. Neck pain, chronic    3. Drug-induced constipation    4. Fibromyalgia    5. Lumbar radiculopathy    6. Other long term (current) drug therapy    7. Pain in joint involving multiple sites    8. Pain in right arm    9. Sacroiliitis, not elsewhere classified    10. Chronic right shoulder pain        INspect reviewed, in order. Low risk. Repeat 5/2/24 in order.  Increased to Lyrica 200mg TID, sedating, numbs her lips, but benefit outweighs side effects, not covered by insurance. Began Gabapentin 300mg TID, titrated, tolerating well, increased to 400mg TID, increased to 400mg QID, increased to 600mg QID prn.  Cont Cymbalta 60mg qHS, helping, and sleeping better.  Stopped Norco 5/325mg, failed Percocet 5/325mg, started liquid Norco 7.5/325mg/15ml 15ml QID prn, #1800. Increased to q4h prn, #2700, no longer working. Increased to MS-IR 15mg q6h prn,then MS-Contin 30mg BID, was helping a lot more but tolerant. Rotated to Percocet 10mg QID prn, then q4h prn. Used new refill at q4h then rotated to Xtampza 27mg BID. Filled 7/10/24.   Patient's symptoms are still adequately managed by current medication regimen, is doing well at this strength and dosage, therefore I will continue to prescribe unchanged as the most appropriate course of treatment.  Failed Mobic 7.5mg qd - BID prn, failed Celebrex.  Began Silenor, denied. Restart Doxepin 10mg qHS prn.  Restarted Tizanidine 4mg TID prn, continue.  Reorder Relistor 450mg qdaily, failed Miralax, colace, ex-lax.  Began RxAlt #2 compounded cream.  Ordered Flector BID prn.  New referral to Rheum.  Referral to PT for LBP and L hip pain.  Performed b/l SIJ injection. Repeat denied for no recent PT, but patient has failed PT in past.  Referral to Dr. Seiple for possible h/o TIAs.  Referral to Cardiology for unmanaged (supraventricular?) tachycardia.  Had R CTS  injection with Dr. Segundo, still painful, likely needs surgical release.  RTC in 3 months for f/u.

## 2024-08-05 ENCOUNTER — SPECIALTY PHARMACY (OUTPATIENT)
Dept: INFUSION THERAPY | Facility: HOSPITAL | Age: 39
End: 2024-08-05
Payer: MEDICAID

## 2024-08-05 NOTE — PROGRESS NOTES
Specialty Pharmacy Refill Coordination Note     Alyson is a 39 y.o. female contacted today regarding refills of  1 specialty medication(s).    Reviewed and verified with patient:       Specialty medication(s) and dose(s) confirmed: yes    Refill Questions      Flowsheet Row Most Recent Value   Changes to allergies? No   Changes to medications? No   New conditions or infections since last clinic visit No   If yes, please describe  NA   Unplanned office visit, urgent care, ED, or hospital admission in the last 4 weeks  No   How does patient/caregiver feel medication is working? Very good   Financial problems or insurance changes  No   Since the previous refill, were any specialty medication doses or scheduled injections missed or delayed?  No   Does this patient require a clinical escalation to a pharmacist? No            Delivery Questions      Flowsheet Row Most Recent Value   Delivery method Other (Comment)  [UPS]   Delivery address verified with patient/caregiver? Yes   Delivery address Home   Number of medications in delivery 1   Medication(s) being filled and delivered Ubrogepant   Doses left of specialty medications 5 tab   Copay verified? Yes   Copay amount $0   Copay form of payment No copayment ($0)   Ship Date 8/7/24   Delivery Date 8/8/24   Signature Required Yes                   Follow-up: 25 day(s)     Bev Brian  Specialty Pharmacy Technician        
0

## 2024-09-17 DIAGNOSIS — G89.29 CHRONIC MIDLINE LOW BACK PAIN, UNSPECIFIED WHETHER SCIATICA PRESENT: ICD-10-CM

## 2024-09-17 DIAGNOSIS — M54.50 CHRONIC MIDLINE LOW BACK PAIN, UNSPECIFIED WHETHER SCIATICA PRESENT: ICD-10-CM

## 2024-09-19 ENCOUNTER — PATIENT MESSAGE (OUTPATIENT)
Dept: NEUROLOGY | Facility: CLINIC | Age: 39
End: 2024-09-19
Payer: MEDICAID

## 2024-09-19 RX ORDER — OXYCODONE 27 MG/1
1 CAPSULE, EXTENDED RELEASE ORAL EVERY 12 HOURS
Qty: 30 EACH | Refills: 0 | Status: SHIPPED | OUTPATIENT
Start: 2024-09-19

## 2024-09-23 ENCOUNTER — TELEPHONE (OUTPATIENT)
Dept: PAIN MEDICINE | Facility: CLINIC | Age: 39
End: 2024-09-23

## 2024-09-23 DIAGNOSIS — G89.29 CHRONIC MIDLINE LOW BACK PAIN, UNSPECIFIED WHETHER SCIATICA PRESENT: ICD-10-CM

## 2024-09-23 DIAGNOSIS — M54.50 CHRONIC MIDLINE LOW BACK PAIN, UNSPECIFIED WHETHER SCIATICA PRESENT: ICD-10-CM

## 2024-09-23 RX ORDER — OXYCODONE 27 MG/1
1 CAPSULE, EXTENDED RELEASE ORAL EVERY 12 HOURS
Qty: 30 EACH | Refills: 0 | Status: SHIPPED | OUTPATIENT
Start: 2024-09-23

## 2024-09-23 RX ORDER — SUMATRIPTAN 20 MG/1
1 SPRAY NASAL
Qty: 6 EACH | Refills: 3 | Status: SHIPPED | OUTPATIENT
Start: 2024-09-23

## 2024-09-23 NOTE — TELEPHONE ENCOUNTER
Provider: DR BRO    Caller: FER DOS SANTOS     Relationship to Patient: SIGNIFICANT OTHER     Pharmacy:   [id number] - [place], [place] - [address] - [phone]  - [phone] FX   [address] [place] [address]   Phone: [phone] Fax: [phone]   Hours: Not open 24 hours       Phone Number: FER DOS SANTOS CELL  [phone]        Reason for Call: RX   oxyCODONE ER (Xtampza ER) 27 MG capsule extended-release 12 hour 1 capsule Oral Every 12 Hours 81 MME/Day     09/19/2024 QUANTITY 30 INSTEAD OF 60, PHARMACY NEEDS PA - PATIENT COMPLETELY OUT OF RX, PHARMACY CLOSES AT 8 PM   When was the patient last seen: 08/01/2024

## 2024-09-23 NOTE — TELEPHONE ENCOUNTER
Hub staff attempted to follow warm transfer process and was unsuccessful     Caller: Alyson Lew    Relationship to patient: Self    Best call back number: [phone]    Patient is needing: PATIENT STATES THAT THE INCORRECT AMOUNT OF MEDICATION WAS SENT IN TO HER PHARMACY, SHE STATES IT WAS ONLY SENT IN FOR 30 PILLS. SHE ALSO STATES THAT THEY ARE STILL NEEDING A PRIOR AUTH. SHE STATES SHE IS OUT OF HER MEDICATION, AND IS ASKING WHAT SHE IS SUPPOSED TO DO IN THE INTERIM.

## 2024-09-27 ENCOUNTER — SPECIALTY PHARMACY (OUTPATIENT)
Dept: INFUSION THERAPY | Facility: HOSPITAL | Age: 39
End: 2024-09-27
Payer: MEDICAID

## 2024-10-09 ENCOUNTER — SPECIALTY PHARMACY (OUTPATIENT)
Dept: INFUSION THERAPY | Facility: HOSPITAL | Age: 39
End: 2024-10-09
Payer: MEDICAID

## 2024-10-09 NOTE — PROGRESS NOTES
Specialty Pharmacy Patient Management Program  Neurology Reassessment     Alyson Lew is a 39 y.o. female with migraines seen by a Neurology provider and enrolled in the Neurology Patient Management program offered by [facility].  A follow-up outreach was conducted, including assessment of continued therapy appropriateness, medication adherence, and side effect incidence and management for Ubrelvy 100mg.     Changes to Insurance Coverage or Financial Support  No changes to the patient's insurance.      Relevant Past Medical History and Comorbidities  Relevant medical history and concomitant health conditions were discussed with the patient. The patient's chart has been reviewed for relevant past medical history and comorbid health conditions and updated as necessary.   Past Medical History:   Diagnosis Date    Allergic     Anemia     Ankle sprain     Many times throughout life    Anxiety     Arthritis     Arthritis of back     On and off most of my life but pain are more frequent    Arthritis of neck     Asthma     Cancer     cervical 2008    Chronic constipation     Chronic pain disorder     CTS (carpal tunnel syndrome) March 2023    Reason for EMG    Depression     Disease of thyroid gland     Dislocation of finger 2004    [facility]    Extremity pain     Fibromyalgia, primary     Fracture, clavicle     When i born    Fracture, finger     [facility]    Fracture, foot 2014    Fractures     Throughout various parts of my life    Headache     Hip arthrosis 10 years    Hx of migraines     Injury of back     Joint pain     Knee sprain 2013    Fell in bathtub    Low back pain     Low back strain     2014    Migraine 2021    Hemiplegic migraines    Neck pain     Peripheral neuropathy     Primary osteoarthritis 01/21/2020    PTSD (post-traumatic stress disorder)     Rheumatoid arthritis 2017    Shortness of breath     Stress fracture 2009    Stroke     Mini heat stroke     Tachycardia     Tear of meniscus of knee 2013    Wrist sprain      Social History     Socioeconomic History    Marital status: Significant Other   Tobacco Use    Smoking status: Never     Passive exposure: Yes    Smokeless tobacco: Never    Tobacco comments:     Went to a few puffs a day after fathers passing   Vaping Use    Vaping status: Every Day    Substances: Nicotine, Flavoring    Devices: Pre-filled or refillable cartridge    Passive vaping exposure: Yes   Substance and Sexual Activity    Alcohol use: No    Drug use: No    Sexual activity: Yes     Partners: Male     Birth control/protection: Tubal ligation     Comment: Been with Carmen for 11yrs     Problem list reviewed by Raghav Gorman RP on 10/9/2024 at  3:46 PM    Hospitalizations and Urgent Care Since Last Assessment  ED Visits, Admissions, or Hospitalizations: Per the patient, there have been no recent ED visits or hospitalizations.    Urgent Office Visits: n/a     Allergies  Known allergies and reactions were discussed with the patient. The patient's chart has been reviewed for allergy information and updated as necessary.   Allergies   Allergen Reactions    Bleach [Sodium Hypochlorite] Anaphylaxis    Adhesive Tape Hives    Latex Hives and Unknown - High Severity     Allergies reviewed by Raghav Gorman RPH on 10/9/2024 at  3:46 PM    Relevant Laboratory Values  Common labs          5/21/2024    04:46 5/21/2024    04:56   Common Labs   Glucose  111    BUN  13    Creatinine  0.67    Sodium  134    Potassium  4.5    Chloride  105    Calcium  9.3    Albumin  4.0    Total Bilirubin  0.2    Alkaline Phosphatase  35    AST (SGOT)  23    ALT (SGPT)  14    WBC 7.92     Hemoglobin 9.1     Hematocrit 29.7     Platelets 318         Lab Assessment  The above labs have been reviewed. No dose adjustments are needed for the specialty medication Ubrelvy based on the labs.     Current Medication List  This medication list has been  reviewed with the patient and evaluated for any interactions or necessary modifications/recommendations, and updated to include all prescription medications, OTC medications, and supplements the patient is currently taking.  This list reflects what is contained in the patient's profile, which has also been marked as reviewed to communicate to other providers it is the most up to date version of the patient's current medication therapy.     Current Outpatient Medications:     albuterol sulfate  (90 Base) MCG/ACT inhaler, INHALE TWO (2) PUFFS BY MOUTH EVERY 4 HOURS AS NEEDED, Disp: 18 g, Rfl: 1    bacitracin 500 UNIT/GM ointment, Apply 1 Application topically to the appropriate area as directed 2 (Two) Times a Day., Disp: 453.9 g, Rfl: 0    chlorhexidine (PERIDEX) 0.12 % solution, RINSE BY MOUTH WITH ONE (1) capful TWICE DAILY, Disp: , Rfl:     divalproex (Depakote ER) 250 MG 24 hr tablet, Take 3 tablets by mouth Daily for 7 days, THEN 2 tablets 2 (Two) Times a Day, Disp: 360 tablet, Rfl: 2    doxepin (SINEquan) 10 MG capsule, Take 1 capsule by mouth At Night As Needed for Sleep., Disp: 30 capsule, Rfl: 5    gabapentin (NEURONTIN) 600 MG tablet, Take 1 tablet by mouth 4 (Four) Times a Day., Disp: 120 tablet, Rfl: 5    gabapentin (NEURONTIN) 600 MG tablet, Take 1 tablet by mouth 4 (Four) Times a Day., Disp: 120 tablet, Rfl: 5    ibuprofen (ADVIL,MOTRIN) 600 MG tablet, Take 1 tablet by mouth Every 8 (Eight) Hours As Needed (pain)., Disp: 15 tablet, Rfl: 0    Ibuprofen 3 %, Gabapentin 10 %, Baclofen 2 %, lidocaine 4 %, Apply 1-2 g topically to the appropriate area as directed 3 (Three) to 4 (Four) times daily., Disp: 90 g, Rfl: 0    levothyroxine (LEVO-T) 75 MCG tablet, Take 1 tablet by mouth Daily., Disp: 30 tablet, Rfl: 1    Methylnaltrexone Bromide (Relistor) 150 MG tablet, Take 3 tablets by mouth Every Morning Before Breakfast., Disp: 90 tablet, Rfl: 2    nitrofurantoin, macrocrystal-monohydrate, (MACROBID)  100 MG capsule, , Disp: , Rfl:     omeprazole (PrilOSEC) 40 MG capsule, Take 1 capsule by mouth Daily., Disp: 30 capsule, Rfl: 6    oxyCODONE ER (Xtampza ER) 27 MG capsule extended-release 12 hour , Take 1 capsule by mouth Every 12 (Twelve) Hours., Disp: 60 each, Rfl: 0    oxyCODONE ER (Xtampza ER) 27 MG capsule extended-release 12 hour , Take 1 capsule by mouth Every 12 (Twelve) Hours., Disp: 30 each, Rfl: 0    oxyCODONE ER (Xtampza ER) 27 MG capsule extended-release 12 hour , Take 1 capsule by mouth Every 12 (Twelve) Hours., Disp: 30 each, Rfl: 0    oxyCODONE-acetaminophen (Percocet)  MG per tablet, Take 1 tablet by mouth Every 4 (Four) Hours As Needed for Moderate Pain., Disp: 180 tablet, Rfl: 0    oxyCODONE-acetaminophen (Percocet)  MG per tablet, Take 1 tablet by mouth Every 6 (Six) Hours As Needed for Moderate Pain., Disp: 120 tablet, Rfl: 0    Pediatric Multiple Vit-C-FA (FLINSTONES GUMMIES OMEGA-3 DHA) chewable tablet, FLINSTONES GUMMIES OMEGA-3 DHA CHEW, Disp: , Rfl:     prochlorperazine (COMPAZINE) 10 MG tablet, Take 1 tablet by mouth Every 6 (Six) Hours As Needed., Disp: , Rfl:     SUMAtriptan (IMITREX) 100 MG tablet, Take 1 tablet by mouth at onset of migraine. May repeat once after 2 hours if needed., Disp: 27 tablet, Rfl: 3    SUMAtriptan (IMITREX) 20 MG/ACT nasal spray, Use one spray into each nostril(s) as directed by provider Every 2 (Two) Hours As Needed for migraine, Disp: 6 each, Rfl: 3    tiZANidine (ZANAFLEX) 4 MG tablet, Take 1 tablet by mouth Every 8 (Eight) Hours As Needed for Muscle Spasms., Disp: 90 tablet, Rfl: 11    ubrogepant (Ubrelvy) 100 MG tablet, Take 1 tablet by mouth 1 (One) Time As Needed for Migraine. May repeat dose in two hours if symptoms persist. Max 200mg/day., Disp: 16 tablet, Rfl: 5    Medicines reviewed by Raghav Gorman [facility] on 10/9/2024 at  3:46 PM    Drug Interactions  The patient's medication profile has been reviewed for accuracy and  assessed for interactions. There are currently no significant interactions to address at this time, however, the patient has been encouraged to notify our office if they plan to start any new medications or supplements.      Adverse Drug Reactions  Medication tolerability: Tolerating with no to minimal ADRs          Medication plan: Continue therapy with normal follow-up  Plan for ADR Management: Per the patient, there are no reportable adverse events related to Ubrelvy.       Adherence, Self-Administration, and Current Therapy Problems  Adherence related patient's specialty therapy was discussed with the patient. The Adherence segment of this outreach has been reviewed and updated.   Adherence Questions  Linked Medication(s) Assessed: Ubrogepant  On average, how many doses/injections does the patient miss per month?: 0  What are the identified reasons for non-adherence or missed doses? : no problems identified  What is the estimated medication adherence level?: %  Based on the patient/caregiver response and refill history, does this patient require an MTP to track adherence improvements?: no    Additional Barriers to Patient Self-Administration: the patient is able to take her Ubrelvy in a timely manner for most episodes and typically requires both doses per migraine episode.   Methods for Supporting Patient Self-Administration: n/a    Recently Close Medication Therapy Problems  No medication therapy recommendations to display  Open Medication Therapy Problems  No medication therapy recommendations to display     Goals of Therapy  Goals related to the patient's specialty therapy was discussed with the patient. The Patient Goals segment of this outreach has been reviewed and updated.    Goals Addressed Today        Specialty Pharmacy General Goal      2/2/24 - Patient reports up to 8 to 10 migraine days per month. Her migraines are often hemiplegic and some occur without her noticing until she wakes up.  She  would be pleased with a 75% reduction in symptoms if taken in an appropriate timeframe.     7/11/24 - patient did not see the desired symptom reduction for Nurtec and hopes for at least a 50% reduction in symptoms with the first dose of Ubrelvy.  The patient is also hopeful that the option of a second dose could help with more of her treatment resistant migraines.     10/9/24 - the patient has seen a better reduction with Ubrelvy than with Nurtec, however, the 50% reduction in symptoms typically takes both doses of Ubrelvy. Usually after her second dose, her migraine is tolerable and able to perform normal ADLs.                Quality of Life Assessment   Quality of Life related to the patient's enrollment in the patient management program and services provided was discussed with the patient. The QOL segment of this outreach has been reviewed and updated.   Quality of Life Improvement Scale: 7-Somewhat better    Reassessment Plan & Follow-Up  Medication Therapy Changes: Patient to continue Ubrelvy 100mg as needed with a max daily dose of 200mg until further notice.   Related Plans, Therapy Recommendations, or Issues to Be Addressed: The patient is scheduled to see Dr. Seipel in January for her follow up.  Due to the nature of her migraines, Ubrelvy continues to be the best abortive option thus far.   Pharmacist to perform regular reassessments no more than (6) months from the previous assessment.  Care Coordinator to set up future refill outreaches, coordinate prescription delivery, and escalate clinical questions to pharmacist.     Attestation  Therapeutic appropriateness: Appropriate  I attest the patient was actively involved in and has agreed to the above plan of care. If the prescribed therapy is at any point deemed not appropriate based on the current or future assessments, a consultation will be initiated with the patient's specialty care provider to determine the best course of action. The revised plan of  therapy will be documented along with any additional patient education provided. Discussed aforementioned material with patient via telemedicine.    Tyrone Gorman PharmD  Clinic Specialty Pharmacist, Neurology  10/9/2024  15:48 EDT

## 2024-10-22 ENCOUNTER — SPECIALTY PHARMACY (OUTPATIENT)
Dept: INFUSION THERAPY | Facility: HOSPITAL | Age: 39
End: 2024-10-22
Payer: MEDICAID

## 2024-10-22 NOTE — PROGRESS NOTES
Specialty Pharmacy Refill Coordination Note     Alyson is a 39 y.o. female contacted today regarding refills of her specialty medication(s).    Specialty medication(s) and dose(s) confirmed: Ubrelvy 100mg  Changes to medications: no  Changes to insurance: no  Reviewed and verified with patient:         Refill Questions      Flowsheet Row Most Recent Value   Changes to allergies? No   Changes to medications? No   New conditions or infections since last clinic visit No   Unplanned office visit, urgent care, ED, or hospital admission in the last 4 weeks  No   How does patient/caregiver feel medication is working? Very good   Financial problems or insurance changes  No   Since the previous refill, were any specialty medication doses or scheduled injections missed or delayed?  No   Does this patient require a clinical escalation to a pharmacist? No            Delivery Questions      Flowsheet Row Most Recent Value   Delivery method UPS   Delivery address verified with patient/caregiver? Yes   Delivery address Home   Number of medications in delivery 1   Medication(s) being filled and delivered Ubrogepant (UBRELVY)   Doses left of specialty medications 3   Copay verified? Yes   Copay amount $0   Copay form of payment No copayment ($0)   Ship Date 10/22/24   Delivery Date 10/23/24   Signature Required Yes                 Follow-up: 25 day(s)     Raghav Gorman [facility]  10/22/2024   09:12 EDT

## 2024-10-30 ENCOUNTER — TELEPHONE (OUTPATIENT)
Dept: PAIN MEDICINE | Facility: CLINIC | Age: 39
End: 2024-10-30
Payer: MEDICAID

## 2024-10-30 NOTE — TELEPHONE ENCOUNTER
Caller: BARBARA DE LEON     Relationship to patient: PATIENT     Best call back number: [phone]    Chief complaint: all over pain, especially back, neck and bilateral hip pain, right knee.     Type of visit: FOLLOW UP     Requested date: 10/31/2024  10:30 IN OFFICE, PATIENT REQUEST VIDEO APPOINTMENT INDICATING SHE INJURED HER ANKLE TODAY WHEN SHE JAMMED INTO THE NAI DRAWERS    If rescheduling, when is the original appointment: 10/31/2024 10:30 AM      Additional notes:PATIENT ASKING FOR CALL BACK RIGHT AWAY INDICATING SHE HAS TO HAVE AN APPOINTMENT  BECAUSE OF MEDS - PATIENT DOESN'T SEE HOW SHE CAN GET TO APPOINTMENT WITH INJURED ANKLE.  IF YOU WILL NOT SEBASTIAN HER A VIDEO APPOINTMENT SHE NEEDS TO KNOW RIGHT AWAY AS IT'S GOING TO TAKE HER A LONG TIME TO GET READY AND GET HERSELF THERE.  PLEASE CALL PATIENT, ADVISE

## 2024-10-31 ENCOUNTER — TELEMEDICINE (OUTPATIENT)
Dept: PAIN MEDICINE | Facility: CLINIC | Age: 39
End: 2024-10-31
Payer: MEDICAID

## 2024-10-31 DIAGNOSIS — G89.29 CHRONIC MIDLINE LOW BACK PAIN, UNSPECIFIED WHETHER SCIATICA PRESENT: Primary | ICD-10-CM

## 2024-10-31 DIAGNOSIS — M25.511 CHRONIC RIGHT SHOULDER PAIN: ICD-10-CM

## 2024-10-31 DIAGNOSIS — M54.16 LUMBAR RADICULOPATHY: ICD-10-CM

## 2024-10-31 DIAGNOSIS — M54.50 CHRONIC MIDLINE LOW BACK PAIN, UNSPECIFIED WHETHER SCIATICA PRESENT: Primary | ICD-10-CM

## 2024-10-31 DIAGNOSIS — G89.29 CHRONIC RIGHT SHOULDER PAIN: ICD-10-CM

## 2024-10-31 DIAGNOSIS — M25.50 PAIN IN JOINT INVOLVING MULTIPLE SITES: ICD-10-CM

## 2024-10-31 DIAGNOSIS — Z79.899 OTHER LONG TERM (CURRENT) DRUG THERAPY: ICD-10-CM

## 2024-10-31 DIAGNOSIS — M79.7 FIBROMYALGIA: ICD-10-CM

## 2024-10-31 DIAGNOSIS — M46.1 SACROILIITIS, NOT ELSEWHERE CLASSIFIED: ICD-10-CM

## 2024-10-31 DIAGNOSIS — K59.03 DRUG-INDUCED CONSTIPATION: ICD-10-CM

## 2024-10-31 DIAGNOSIS — M54.2 NECK PAIN, CHRONIC: ICD-10-CM

## 2024-10-31 DIAGNOSIS — G89.29 NECK PAIN, CHRONIC: ICD-10-CM

## 2024-10-31 DIAGNOSIS — M79.601 PAIN IN RIGHT ARM: ICD-10-CM

## 2024-10-31 RX ORDER — OXYCODONE 27 MG/1
1 CAPSULE, EXTENDED RELEASE ORAL EVERY 12 HOURS
Qty: 60 EACH | Refills: 0 | Status: SHIPPED | OUTPATIENT
Start: 2024-10-31

## 2024-10-31 NOTE — PROGRESS NOTES
"Subjective   Alyson Lew is a 39 y.o. female.     History of Present Illness  all over pain, especially back, neck and bilateral hip pain, right knee. Dz with fibromyalgia and inflammatory arthritis by Rheum, on DMARDs and Lyrica 100mg BID at initial visit with poor control, pain worsening, 10/10 at worst, 7/10 at best, always present, prevents work and housework, burning, sharp, varies. Prior notes reviewed, referred for worsening pain, does not have time for PT. Increased to Lyrica 100mg BID then TID, started Celebrex, Cymbalta 30mg then 60mg qHS, fewer flares but still severe fibromyalgia pain, also LBP and b/l hip pain. Started Norco 5/325mg, mostly taking qHS, some days BID with worsening pain with cold weather, becoming less effective, rotated to Percocet 5/325mg. C/o insomnia. Went to ED with severe pain not controlled with Percocet 5/325mg BID prn, now QID prn. Had gastric sleeve surgery, liquid Norco worked well, restarted Percocet with severe N/V, vomited up complete prescription. Started liquid Norco, working better, but pain worsening, having difficulty with ADLs, increased Norco and Lyrica, then MS-IR 15mg QID prn with pill . Worsening L \"hip\" pain, insomnia, constipation. Requesting NSAID. Had toni. Has FH of autoimmune disorders and MS, hasn't heard from Rheum. Needs PT before having injection, worsening SIJ pain even with PT, had b/l SIJ injections, helped, would like to repeat.    Also reports \"episodes\" where she loses the ability to speak c/w Broca's aphasia, resolves over 3-4 days spontaneously, had CT-brain in ED which was negative. Has h/o tachycardia, failed ablation, is not currently medicated.   Back Pain  Pertinent negatives include no abdominal pain, bladder incontinence, chest pain, fever, numbness or weakness.   Fibromyalgia  Associated symptoms include arthralgias and neck pain. Pertinent negatives include no abdominal pain, chest pain, chills, fatigue, fever, joint " swelling, myalgias, nausea, numbness, vomiting or weakness.   Arthritis  She reports no joint swelling. Pertinent negatives include no diarrhea, fatigue or fever.        The following portions of the patient's history were reviewed and updated as appropriate: allergies, current medications, past family history, past medical history, past social history, past surgical history and problem list.    Review of Systems   Constitutional:  Negative for chills, fatigue and fever.   HENT:  Negative for hearing loss and trouble swallowing.    Eyes:  Positive for visual disturbance.   Respiratory:  Positive for shortness of breath.    Cardiovascular:  Negative for chest pain.   Gastrointestinal:  Positive for constipation. Negative for abdominal pain, diarrhea, nausea and vomiting.   Genitourinary:  Negative for urinary incontinence.   Musculoskeletal:  Positive for arthralgias, back pain and neck pain. Negative for joint swelling and myalgias.   Neurological:  Positive for headache. Negative for dizziness, weakness and numbness.       Objective   Physical Exam   Constitutional: She is oriented to person, place, and time.   Neurological: She is alert and oriented to person, place, and time.   Psychiatric: Her behavior is normal. Mood, judgment and thought content normal.         Assessment & Plan   Diagnoses and all orders for this visit:    1. Chronic midline low back pain, unspecified whether sciatica present (Primary)    2. Neck pain, chronic    3. Sacroiliitis, not elsewhere classified    4. Other long term (current) drug therapy    5. Pain in right arm    6. Chronic right shoulder pain    7. Fibromyalgia    8. Pain in joint involving multiple sites    9. Drug-induced constipation    10. Lumbar radiculopathy        INspect reviewed, in order. Low risk. Repeat 5/2/24 in order.  Increased to Lyrica 200mg TID, sedating, numbs her lips, but benefit outweighs side effects, not covered by insurance. Began Gabapentin 300mg TID,  titrated, tolerating well, increased to 400mg TID, increased to 400mg QID, increased to 600mg QID prn.  Cont Cymbalta 60mg qHS, helping, and sleeping better.  Stopped Norco 5/325mg, failed Percocet 5/325mg, started liquid Norco 7.5/325mg/15ml 15ml QID prn, #1800. Increased to q4h prn, #2700, no longer working. Increased to MS-IR 15mg q6h prn,then MS-Contin 30mg BID, was helping a lot more but tolerant. Rotated to Percocet 10mg QID prn, then q4h prn. Used new refill at q4h then rotated to Xtampza 27mg BID. Filled 10/9/24.   Patient's symptoms are still adequately managed by current medication regimen, is doing well at this strength and dosage, therefore I will continue to prescribe unchanged as the most appropriate course of treatment.  Failed Mobic 7.5mg qd - BID prn, failed Celebrex.  Began Silenor, denied. Restarted Doxepin 10mg qHS prn.  Restarted Tizanidine 4mg TID prn, continue.  Reorder Relistor 450mg qdaily, failed Miralax, colace, ex-lax.  Began RxAlt #2 compounded cream.  Ordered Flector BID prn. Begin Licart prn.   New referral to Rheum.  Referral to PT for LBP and L hip pain.  Performed b/l SIJ injection. Repeat denied for no recent PT, but patient has failed PT in past.  Referral to Dr. Seiple for possible h/o TIAs.  Referral to Cardiology for unmanaged (supraventricular?) tachycardia.  Had R CTS injection with Dr. Segundo, still painful, likely needs surgical release.  RTC in 3 months for f/u.  Video visit. I am at our [address] [place] [address] office, she is at home. She consents to video visit using [employer], necessary due to ankle injury.    Note: Patient should not utilize cervical traction or a chiropractor for neck pain with her history of RA.

## 2024-11-05 ENCOUNTER — PATIENT MESSAGE (OUTPATIENT)
Dept: PAIN MEDICINE | Facility: CLINIC | Age: 39
End: 2024-11-05
Payer: MEDICAID

## 2024-11-12 RX ORDER — DICLOFENAC EPOLAMINE 0.01 G/1
1 SYSTEM TOPICAL 2 TIMES DAILY PRN
Qty: 60 PATCH | Refills: 2 | Status: SHIPPED | OUTPATIENT
Start: 2024-11-12

## 2024-11-18 ENCOUNTER — SPECIALTY PHARMACY (OUTPATIENT)
Dept: INFUSION THERAPY | Facility: HOSPITAL | Age: 39
End: 2024-11-18
Payer: MEDICAID

## 2024-11-18 NOTE — PROGRESS NOTES
Specialty Pharmacy Refill Coordination Note     Alyson is a 39 y.o. female contacted today regarding refills of her specialty medication(s).    Specialty medication(s) and dose(s) confirmed: Ubrelvy 100mg  Changes to medications: no  Changes to insurance: no  Reviewed and verified with patient:         Refill Questions      Flowsheet Row Most Recent Value   Changes to allergies? No   Changes to medications? No   New conditions or infections since last clinic visit No   Unplanned office visit, urgent care, ED, or hospital admission in the last 4 weeks  No   How does patient/caregiver feel medication is working? Very good   Financial problems or insurance changes  No   Since the previous refill, were any specialty medication doses or scheduled injections missed or delayed?  No   Does this patient require a clinical escalation to a pharmacist? No            Delivery Questions      Flowsheet Row Most Recent Value   Delivery method UPS   Delivery address verified with patient/caregiver? Yes   Delivery address Home   Number of medications in delivery 1   Medication(s) being filled and delivered Ubrogepant (UBRELVY)   Doses left of specialty medications 2 doses   Copay verified? Yes   Copay amount $0   Copay form of payment No copayment ($0)   Ship Date 11/18/24   Delivery Date 11/19/24   Signature Required Yes                 Follow-up: 25 day(s)     Raghav Gorman [facility]  11/18/2024   11:39 EST

## 2024-12-13 ENCOUNTER — SPECIALTY PHARMACY (OUTPATIENT)
Dept: INFUSION THERAPY | Facility: HOSPITAL | Age: 39
End: 2024-12-13
Payer: MEDICAID

## 2024-12-13 NOTE — PROGRESS NOTES
Specialty Pharmacy Refill Coordination Note     Alyson is a 39 y.o. female contacted today regarding refills of  1 specialty medication(s).    Reviewed and verified with patient:       Specialty medication(s) and dose(s) confirmed: yes    Refill Questions      Flowsheet Row Most Recent Value   Changes to allergies? No   Changes to medications? No   New conditions or infections since last clinic visit No   If yes, please describe  NA   Unplanned office visit, urgent care, ED, or hospital admission in the last 4 weeks  No   How does patient/caregiver feel medication is working? Very good   Financial problems or insurance changes  No   Since the previous refill, were any specialty medication doses or scheduled injections missed or delayed?  No   Does this patient require a clinical escalation to a pharmacist? No            Delivery Questions      Flowsheet Row Most Recent Value   Delivery method UPS   Delivery address verified with patient/caregiver? Yes   Delivery address Home   Number of medications in delivery 1   Medication(s) being filled and delivered Ubrogepant (UBRELVY)   Doses left of specialty medications 4 tabs   Copay verified? Yes   Copay amount $0   Copay form of payment No copayment ($0)   Ship Date 12/16/24   Delivery Date 12/17/24   Signature Required Yes                   Follow-up: 25 day(s)     Bev Brian  Specialty Pharmacy Technician

## 2025-01-07 ENCOUNTER — PATIENT MESSAGE (OUTPATIENT)
Dept: PAIN MEDICINE | Facility: CLINIC | Age: 40
End: 2025-01-07
Payer: MEDICAID

## 2025-01-07 ENCOUNTER — TELEPHONE (OUTPATIENT)
Dept: PAIN MEDICINE | Facility: CLINIC | Age: 40
End: 2025-01-07
Payer: MEDICAID

## 2025-01-07 NOTE — TELEPHONE ENCOUNTER
Hub staff attempted to follow warm transfer process and was unsuccessful     Caller: Alyson Lew    Relationship to patient: Self    Best call back number: [phone]    Patient is needing: PATIENT STATES THAT THE INSURANCE IS DECLINING THE MEDICATION REFILL

## 2025-01-08 ENCOUNTER — PATIENT MESSAGE (OUTPATIENT)
Dept: PAIN MEDICINE | Facility: CLINIC | Age: 40
End: 2025-01-08
Payer: MEDICAID

## 2025-01-08 RX ORDER — MORPHINE SULFATE 15 MG/1
15 TABLET ORAL EVERY 6 HOURS PRN
Qty: 120 TABLET | Refills: 0 | Status: SHIPPED | OUTPATIENT
Start: 2025-01-08

## 2025-01-09 ENCOUNTER — HOSPITAL ENCOUNTER (OUTPATIENT)
Facility: HOSPITAL | Age: 40
Discharge: HOME | End: 2025-01-09
Attending: EMERGENCY MEDICINE | Admitting: EMERGENCY MEDICINE
Payer: MEDICAID

## 2025-01-09 VITALS
RESPIRATION RATE: 18 BRPM | OXYGEN SATURATION: 98 % | TEMPERATURE: 99.6 F | DIASTOLIC BLOOD PRESSURE: 94 MMHG | BODY MASS INDEX: 31.96 KG/M2 | SYSTOLIC BLOOD PRESSURE: 142 MMHG | HEIGHT: 63 IN | HEART RATE: 100 BPM | WEIGHT: 180.4 LBS

## 2025-01-09 DIAGNOSIS — G89.4 CHRONIC PAIN SYNDROME: Primary | ICD-10-CM

## 2025-01-09 PROCEDURE — 25010000002 MORPHINE PER 10 MG

## 2025-01-09 PROCEDURE — G0463 HOSPITAL OUTPT CLINIC VISIT: HCPCS

## 2025-01-09 PROCEDURE — 25010000002 KETOROLAC TROMETHAMINE PER 15 MG

## 2025-01-09 RX ORDER — KETOROLAC TROMETHAMINE 30 MG/ML
30 INJECTION, SOLUTION INTRAMUSCULAR; INTRAVENOUS ONCE
Status: COMPLETED | OUTPATIENT
Start: 2025-01-09 | End: 2025-01-09

## 2025-01-09 RX ADMIN — MORPHINE SULFATE 4 MG: 4 INJECTION, SOLUTION INTRAMUSCULAR; INTRAVENOUS at 18:55

## 2025-01-09 RX ADMIN — KETOROLAC TROMETHAMINE 30 MG: 30 INJECTION, SOLUTION INTRAMUSCULAR at 18:54

## 2025-01-09 NOTE — FSED PROVIDER NOTE
Subjective   History of Present Illness  Chief Complaint: Pain medication refill      HPI: Patient is a 39-year-old female presents by private vehicle with a known history of rheumatoid arthritis, fibromyalgia, osteoarthritis, primary neuropathy requesting refill on her pain medication.  She advises that she is establish care with a pain management her insurance recently changed manufacturers and will no longer be filling her Xtampza ER 27mg.  She has been in contact with her pain management provider and is pending a prior authorization for Morphine 15mg.  Her pain is general, worse in her shoulder blade , consistent with her chronic pain, no acute changes today.  Last dose of pain medication was 1/6/2024 at 7pm.     PCP: jose  Pain management: [facility]    History provided by:  Patient      Review of Systems  See above as noted    Past Medical History:   Diagnosis Date    Allergic     Anemia     Ankle sprain     Many times throughout life    Anxiety     Arthritis     Arthritis of back     On and off most of my life but pain are more frequent    Arthritis of neck     Asthma     Cancer     cervical 2008    Chronic constipation     Chronic pain disorder     CTS (carpal tunnel syndrome) March 2023    Reason for EMG    Depression     Disease of thyroid gland     [facility]    Extremity pain     Fibromyalgia, primary     Fracture, clavicle     When i born    Fracture, finger     2004 [facility]    Fracture, foot 2014    Fractures     Throughout various parts of my life    Headache     Hip arthrosis 10 years    Hx of migraines     Injury of back     Joint pain     Knee sprain 2013    Fell in bathtub    Low back pain     Low back strain     2014    Migraine 2021    Hemiplegic migraines    Neck pain     Peripheral neuropathy     Primary osteoarthritis 01/21/2020    PTSD (post-traumatic stress disorder)     Rheumatoid arthritis 2017    Shortness of breath     Stress fracture 2009     Stroke     Mini heat stroke    Tachycardia     Tear of meniscus of knee     Wrist sprain        Allergies   Allergen Reactions    Bleach [Sodium Hypochlorite] Anaphylaxis    Adhesive Tape Hives    Latex Hives and Unknown - High Severity       Past Surgical History:   Procedure Laterality Date    CARDIAC ABLATION  2020    CARDIAC CATHETERIZATION N/A 2019    Procedure: Coronary angiography;  Surgeon: Leo Fagan MD;  Location: [facility] CATH INVASIVE LOCATION;  Service: Cardiovascular    CARDIAC CATHETERIZATION Left 2019    Procedure: Cardiac Catheterization/Vascular Study;  Surgeon: Leo Fagan MD;  Location: [facility] CATH INVASIVE LOCATION;  Service: Cardiovascular    CARDIAC CATHETERIZATION N/A 2019    Procedure: Left ventriculography;  Surgeon: Leo Fagan MD;  Location: [facility] CATH INVASIVE LOCATION;  Service: Cardiovascular    CARDIAC ELECTROPHYSIOLOGY PROCEDURE N/A 2020    Procedure: EP/Ablation;  Surgeon: Luther Walden MD;  Location: [facility] CATH INVASIVE LOCATION;  Service: Cardiovascular;  Laterality: N/A;     SECTION      x2    CHOLECYSTECTOMY      ENDOSCOPY      FOOT SURGERY  3 yrs ago    Repair tendon    GASTRIC SLEEVE LAPAROSCOPIC      HAND SURGERY      PERONEAL TENDON EXPLORATION Right 2020    Procedure: EXTENSOR HALLICUS LONGUS REPAIR AND NERVE DECOMPRESSION;  Surgeon: VALERIA Rajan DPM;  Location: [facility] MAIN OR;  Service: Podiatry;  Laterality: Right;    TRIGGER POINT INJECTION      Was done by Dr Rivero    TUBAL ABDOMINAL LIGATION         Family History   Problem Relation Age of Onset    No Known Problems Mother     Cancer Father     Coronary artery disease Father     Diabetes Father     Stroke Father     Heart attack Father     Heart disease Father     Transient ischemic attack Father     Migraines Father     Anesthesia problems Father         Had a hard time waking afterwards    Clotting disorder Father     Arthritis Father     Mental  "illness Brother     Mental illness Son     Migraines Maternal Aunt     Seizures Maternal Aunt     Stroke Maternal Aunt     Migraines Paternal Grandmother     Rheumatologic disease Paternal Aunt        Social History     Socioeconomic History    Marital status: Significant Other   Tobacco Use    Smoking status: Never     Passive exposure: Yes    Smokeless tobacco: Never    Tobacco comments:     Went to a few puffs a day after fathers passing   Vaping Use    Vaping status: Every Day    Substances: Nicotine, Flavoring    Devices: Pre-filled or refillable cartridge    Passive vaping exposure: Yes   Substance and Sexual Activity    Alcohol use: No    Drug use: No    Sexual activity: Yes     Partners: Male     Birth control/protection: Tubal ligation     Comment: Been with Camren for 11yrs           Objective   Physical Exam  Vitals reviewed.   HENT:      Head: Normocephalic.   Eyes:      Pupils: Pupils are equal, round, and reactive to light.   Cardiovascular:      Pulses: Normal pulses.   Pulmonary:      Effort: Pulmonary effort is normal.   Musculoskeletal:         General: Tenderness present.        Arms:       Comments: Tenderness on palpation, no bony step-offs or crepitus of the joint   Neurological:      General: No focal deficit present.      Mental Status: She is alert and oriented to person, place, and time.   Psychiatric:         Mood and Affect: Mood is anxious. Affect is tearful.         Procedures           ED Course  ED Course as of 01/09/25 1926   Thu Jan 09, 2025   1830 Inspect completed patient has had a total of 41 prescriptions for 4 providers.  Most recent fill 12/12/2024 gabapentin 600 mg quantity 120 for 30-day supply, Xtampza ER 27mg quantity 60 for 30-day supply. [BH]      ED Course User Index  [BH] Radha Vidal APRN      /96 (BP Location: Right arm, Patient Position: Sitting)   Pulse 103   Temp 99.7 °F (37.6 °C) (Oral)   Resp 18   Ht 160 cm (63\")   Wt 81.8 kg (180 lb 6.4 oz)  "  SpO2 98%   BMI 31.96 kg/m²   Labs Reviewed - No data to display  Medications   ketorolac (TORADOL) injection 30 mg (30 mg Intramuscular Given 1/9/25 1854)   morphine injection 4 mg (4 mg Subcutaneous Given 1/9/25 1855)     No radiology results for the last day                                       Medical Decision Making  Patient presented with above complaints, noted physical exam was completed I did a chart review which notes multiple conversations with her pain management doctor related to need for change in medication.  She has no acute complaints today her pain is related to her chronic pain exacerbated.  She is pending a prior authorization for morphine 15 mg.  At bedside of advised the patient that I am able to give her a dose here but I am unable to refill the medications or prescribe her any other narcotics as she is established with pain management.  Patient is given verbal understanding and she is agreeable to this plan of care.  Encouraged to continue communication with her pain management doctor as well as her insurance company, agreeable denies further questions or complaints at time of discharge.    Chart review:   8/1/2024 Outpatient visit with pain management.     Labs: Not warranted for this visit    Radiology: Not warranted for this visit    Medications Medications  ketorolac (TORADOL) injection 30 mg (30 mg Intramuscular Given 1/9/25 1854)  morphine injection 4 mg (4 mg Subcutaneous Given 1/9/25 1855)    Part of this note may be an electronic transcription/translation of spoken language to printed text using the Dragon Dictation System.    Appropriate PPE worn during exam.      Note Disclaimer: At [facility], we believe that sharing information builds trust and better  relationships. You are receiving this note because you recently visited [facility]. It is possible you will see health information before a provider has talked with you about it. This kind of information can be easy to  misunderstand. To help you fully understand what it means for your health, we urge you to discuss this note with your provider.      Problems Addressed:  Chronic pain syndrome: complicated acute illness or injury    Risk  Prescription drug management.        Final diagnoses:   Chronic pain syndrome       ED Disposition  ED Disposition       ED Disposition   Discharge    Condition   Stable    Comment   --               Michelle Narayanan MD  [address]  [address]  [place] [id number] [phone]               Medication List      No changes were made to your prescriptions during this visit.

## 2025-01-10 ENCOUNTER — PATIENT MESSAGE (OUTPATIENT)
Dept: PAIN MEDICINE | Facility: CLINIC | Age: 40
End: 2025-01-10
Payer: MEDICAID

## 2025-01-20 NOTE — PROGRESS NOTES
Chief Complaint  Follow-up (MIGRAINES AND SEIZURES)    Subjective          Alyson Lew presents to [facility] for MIGRAINES  History of Present Illness  Patient is here to f/u on migraines a little beter since last visit her last migraine was about 12/5/24 and lasted about 3-4 day medication imitrex prn     Seizures- patient states she had 10-12 since last visit ( unsure what kind of seizures she has) patient and partner states seizures last 5-15 minutes and then she pass out after sometimes.   Patient states she has not had  Depakote in 2-3 months due to issues w/ pharmacy                  ===PREV. OV 1/15/24======  Patient is here to f/u on migraines and to get surgery clearance for oral surgery. Her surgery is 3-, she will have it out patient due to her having seizures.         Migraines Medication-   Sumatriptan 100 mg TAKE 1 TABLET BY MOUTH AT ONSET OF migraine. MAY REPEAT DOSE ONCE AFTER 2 HOURS IF neede  Sumatriptan 20 mg/nasal   She is having 3-4 migraines a month     She takes Depakote  mg 2 bid she has had 1 or 2 seizures a month     She wakes confused and difficulty talking.   Feels a ticking sensation in brain         Current Outpatient Medications:     albuterol sulfate  (90 Base) MCG/ACT inhaler, INHALE TWO (2) PUFFS BY MOUTH EVERY 4 HOURS AS NEEDED, Disp: 18 g, Rfl: 1    cephalexin (KEFLEX) 250 MG capsule, Take 1 capsule by mouth Daily., Disp: , Rfl:     chlorhexidine (PERIDEX) 0.12 % solution, RINSE BY MOUTH WITH ONE (1) capful TWICE DAILY, Disp: , Rfl:     Diclofenac Epolamine (FLECTOR) 1.3 % patch patch, Apply 1 patch topically to the appropriate area as directed 2 (Two) Times a Day As Needed (back pain)., Disp: 60 patch, Rfl: 2    doxepin (SINEquan) 10 MG capsule, Take 1 capsule by mouth At Night As Needed for Sleep., Disp: 30 capsule, Rfl: 5    gabapentin (NEURONTIN) 600 MG tablet, Take 1 tablet by mouth 4 (Four) Times a Day., Disp: 120 tablet,  Rfl: 5    gabapentin (NEURONTIN) 600 MG tablet, Take 1 tablet by mouth 4 (Four) Times a Day., Disp: 120 tablet, Rfl: 5    ibuprofen (ADVIL,MOTRIN) 600 MG tablet, Take 1 tablet by mouth Every 8 (Eight) Hours As Needed (pain)., Disp: 15 tablet, Rfl: 0    levothyroxine (LEVO-T) 75 MCG tablet, Take 1 tablet by mouth Daily., Disp: 30 tablet, Rfl: 1    Methylnaltrexone Bromide (Relistor) 150 MG tablet, Take 3 tablets by mouth Every Morning Before Breakfast., Disp: 90 tablet, Rfl: 2    Morphine (MSIR) 15 MG immediate release tablet, Take 1 tablet by mouth Every 6 (Six) Hours As Needed for Severe Pain., Disp: 120 tablet, Rfl: 0    nitrofurantoin, macrocrystal-monohydrate, (MACROBID) 100 MG capsule, , Disp: , Rfl:     omeprazole (PrilOSEC) 40 MG capsule, Take 1 capsule by mouth Daily., Disp: 30 capsule, Rfl: 6    oxyCODONE ER (Xtampza ER) 27 MG capsule extended-release 12 hour , Take 1 capsule by mouth Every 12 (Twelve) Hours., Disp: 60 each, Rfl: 0    oxyCODONE ER (Xtampza ER) 27 MG capsule extended-release 12 hour , Take 1 capsule by mouth Every 12 (Twelve) Hours., Disp: 60 each, Rfl: 0    oxyCODONE ER (Xtampza ER) 27 MG capsule extended-release 12 hour , Take 1 capsule by mouth Every 12 (Twelve) Hours., Disp: 60 each, Rfl: 0    oxyCODONE-acetaminophen (Percocet)  MG per tablet, Take 1 tablet by mouth Every 4 (Four) Hours As Needed for Moderate Pain., Disp: 180 tablet, Rfl: 0    oxyCODONE-acetaminophen (Percocet)  MG per tablet, Take 1 tablet by mouth Every 6 (Six) Hours As Needed for Moderate Pain., Disp: 120 tablet, Rfl: 0    Pediatric Multiple Vit-C-FA (FLINSTONES GUMMIES OMEGA-3 DHA) chewable tablet, FLINSTONES GUMMIES OMEGA-3 DHA CHEW, Disp: , Rfl:     prochlorperazine (COMPAZINE) 10 MG tablet, Take 1 tablet by mouth Every 6 (Six) Hours As Needed., Disp: , Rfl:     SUMAtriptan (IMITREX) 20 MG/ACT nasal spray, Use one spray into each nostril(s) as directed by provider Every 2 (Two) Hours As Needed for  "migraine, Disp: 6 each, Rfl: 3    tiZANidine (ZANAFLEX) 4 MG tablet, Take 1 tablet by mouth Every 8 (Eight) Hours As Needed for Muscle Spasms., Disp: 90 tablet, Rfl: 11    ubrogepant (Ubrelvy) 100 MG tablet, Take 1 tablet by mouth 1 (One) Time As Needed for Migraine. May repeat dose in two hours if symptoms persist. Max 200mg/day., Disp: 16 tablet, Rfl: 5    divalproex (Depakote ER) 250 MG 24 hr tablet, Take 3 tablets by mouth Daily for 7 days, THEN 2 tablets 2 (Two) Times a Day, Disp: 360 tablet, Rfl: 2    Review of Systems   Constitutional:  Negative for fatigue.   Neurological:  Positive for seizures and headaches.   Psychiatric/Behavioral:  Negative for sleep disturbance.    All other systems reviewed and are negative.         Objective:    Vital Signs:   /65   Pulse 77   Ht 160 cm (63\")   Wt 79.8 kg (176 lb)   BMI 31.18 kg/m²     Physical Exam  Vitals reviewed.   Cardiovascular:      Rate and Rhythm: Normal rate.      Pulses: Normal pulses.   Pulmonary:      Effort: Pulmonary effort is normal.   Neurological:      General: No focal deficit present.      Mental Status: She is alert.   Psychiatric:         Mood and Affect: Mood normal.        Result Review :                Neurological Exam  Mental Status  Alert.        Assessment and Plan    Diagnoses and all orders for this visit:    1. Near syncope (Primary)    2. Migraine equivalent syndrome     SEIZURES VRS MIGRAINE EQUIVALENT SPELLS  RESUME DEPAKOTE CHECK LEVEL IN TWO WEEKS    CONTINUE IMITREX AND UBRELVY     Follow Up   Return in about 6 months (around 7/21/2025).  Patient was given instructions and counseling regarding her condition or for health maintenance advice. Please see specific information pulled into the AVS if appropriate.     This document has been electronically signed by Joseph Seipel, MD on January 21, 2025 09:33 EST      "

## 2025-01-21 ENCOUNTER — OFFICE VISIT (OUTPATIENT)
Dept: NEUROLOGY | Facility: CLINIC | Age: 40
End: 2025-01-21
Payer: MEDICAID

## 2025-01-21 VITALS
HEIGHT: 63 IN | HEART RATE: 77 BPM | SYSTOLIC BLOOD PRESSURE: 105 MMHG | BODY MASS INDEX: 31.18 KG/M2 | WEIGHT: 176 LBS | DIASTOLIC BLOOD PRESSURE: 65 MMHG

## 2025-01-21 DIAGNOSIS — G43.109 MIGRAINE EQUIVALENT SYNDROME: ICD-10-CM

## 2025-01-21 DIAGNOSIS — R55 NEAR SYNCOPE: Primary | ICD-10-CM

## 2025-01-21 PROCEDURE — 1160F RVW MEDS BY RX/DR IN RCRD: CPT | Performed by: PSYCHIATRY & NEUROLOGY

## 2025-01-21 PROCEDURE — 1159F MED LIST DOCD IN RCRD: CPT | Performed by: PSYCHIATRY & NEUROLOGY

## 2025-01-21 PROCEDURE — 99214 OFFICE O/P EST MOD 30 MIN: CPT | Performed by: PSYCHIATRY & NEUROLOGY

## 2025-01-21 RX ORDER — DIVALPROEX SODIUM 500 MG/1
1000 TABLET, FILM COATED, EXTENDED RELEASE ORAL DAILY
Qty: 60 TABLET | Refills: 11 | Status: SHIPPED | OUTPATIENT
Start: 2025-01-21

## 2025-01-21 RX ORDER — SUMATRIPTAN 20 MG/1
1 SPRAY NASAL
Qty: 6 EACH | Refills: 11 | Status: SHIPPED | OUTPATIENT
Start: 2025-01-21

## 2025-01-30 ENCOUNTER — TELEPHONE (OUTPATIENT)
Dept: PAIN MEDICINE | Facility: CLINIC | Age: 40
End: 2025-01-30
Payer: MEDICAID

## 2025-01-30 NOTE — TELEPHONE ENCOUNTER
Hub staff attempted to follow warm transfer process and was unsuccessful     Caller: Alyson Lew    Relationship to patient: Self    Best call back number: [phone]    Patient is needing: PT CALLING TO RESCHEDULE APPT FROM EARLIER TODAY. PT STATES SHE OVERSLEPT. UNABLE TO RESCHEDULE APPT ON THE CALL, SINCE CHART WAS BEING ACCESSED BY SOMEONE AT THE OFFICE. PLEASE CONTACT PT TO RESCHEDULE APPT. PT STATES SHE WILL BE OUT OF HER MEDICATION AFTER 02/04/25.

## 2025-02-04 ENCOUNTER — OFFICE VISIT (OUTPATIENT)
Dept: PAIN MEDICINE | Facility: CLINIC | Age: 40
End: 2025-02-04
Payer: MEDICAID

## 2025-02-04 ENCOUNTER — SPECIALTY PHARMACY (OUTPATIENT)
Dept: INFUSION THERAPY | Facility: HOSPITAL | Age: 40
End: 2025-02-04
Payer: MEDICAID

## 2025-02-04 VITALS
BODY MASS INDEX: 31.28 KG/M2 | OXYGEN SATURATION: 99 % | RESPIRATION RATE: 16 BRPM | DIASTOLIC BLOOD PRESSURE: 85 MMHG | SYSTOLIC BLOOD PRESSURE: 108 MMHG | HEART RATE: 77 BPM | WEIGHT: 176.6 LBS

## 2025-02-04 DIAGNOSIS — G89.29 CHRONIC MIDLINE LOW BACK PAIN, UNSPECIFIED WHETHER SCIATICA PRESENT: ICD-10-CM

## 2025-02-04 DIAGNOSIS — K59.03 DRUG-INDUCED CONSTIPATION: ICD-10-CM

## 2025-02-04 DIAGNOSIS — M46.1 SACROILIITIS, NOT ELSEWHERE CLASSIFIED: ICD-10-CM

## 2025-02-04 DIAGNOSIS — M54.50 CHRONIC MIDLINE LOW BACK PAIN, UNSPECIFIED WHETHER SCIATICA PRESENT: ICD-10-CM

## 2025-02-04 DIAGNOSIS — M54.16 LUMBAR RADICULOPATHY: ICD-10-CM

## 2025-02-04 DIAGNOSIS — G89.29 NECK PAIN, CHRONIC: ICD-10-CM

## 2025-02-04 DIAGNOSIS — Z79.899 OTHER LONG TERM (CURRENT) DRUG THERAPY: ICD-10-CM

## 2025-02-04 DIAGNOSIS — M54.2 NECK PAIN, CHRONIC: ICD-10-CM

## 2025-02-04 DIAGNOSIS — M79.7 FIBROMYALGIA: ICD-10-CM

## 2025-02-04 DIAGNOSIS — M25.511 CHRONIC RIGHT SHOULDER PAIN: ICD-10-CM

## 2025-02-04 DIAGNOSIS — Z79.899 HIGH RISK MEDICATION USE: Primary | ICD-10-CM

## 2025-02-04 DIAGNOSIS — M25.50 PAIN IN JOINT INVOLVING MULTIPLE SITES: ICD-10-CM

## 2025-02-04 DIAGNOSIS — G89.29 CHRONIC RIGHT SHOULDER PAIN: ICD-10-CM

## 2025-02-04 RX ORDER — MORPHINE SULFATE 15 MG/1
15 TABLET ORAL EVERY 6 HOURS PRN
Qty: 120 TABLET | Refills: 0 | Status: SHIPPED | OUTPATIENT
Start: 2025-02-04

## 2025-02-04 RX ORDER — GABAPENTIN 600 MG/1
600 TABLET ORAL 4 TIMES DAILY
Qty: 120 TABLET | Refills: 5 | Status: SHIPPED | OUTPATIENT
Start: 2025-02-04

## 2025-02-04 NOTE — PROGRESS NOTES
"Subjective   Alyson Lew is a 39 y.o. female.     History of Present Illness  all over pain, especially back, neck and bilateral hip pain, right knee. Dz with fibromyalgia and inflammatory arthritis by Rheum, on DMARDs and Lyrica 100mg BID at initial visit with poor control, pain worsening, 10/10 at worst, 7/10 at best, always present, prevents work and housework, burning, sharp, varies. Prior notes reviewed, referred for worsening pain, does not have time for PT. Increased to Lyrica 100mg BID then TID, started Celebrex, Cymbalta 30mg then 60mg qHS, fewer flares but still severe fibromyalgia pain, also LBP and b/l hip pain. Started Norco 5/325mg, mostly taking qHS, some days BID with worsening pain with cold weather, becoming less effective, rotated to Percocet 5/325mg. C/o insomnia. Went to ED with severe pain not controlled with Percocet 5/325mg BID prn, now QID prn. Had gastric sleeve surgery, liquid Norco worked well, restarted Percocet with severe N/V, vomited up complete prescription. Started liquid Norco, working better, but pain worsening, having difficulty with ADLs, increased Norco and Lyrica, then MS-IR 15mg QID prn with pill . Worsening L \"hip\" pain, insomnia, constipation. Requesting NSAID. Had toni. Has FH of autoimmune disorders and MS, hasn't heard from Rheum. Needs PT before having injection, worsening SIJ pain even with PT, had b/l SIJ injections, helped, would like to repeat.    Also reports \"episodes\" where she loses the ability to speak c/w Broca's aphasia, resolves over 3-4 days spontaneously, had CT-brain in ED which was negative. Has h/o tachycardia, failed ablation, is not currently medicated.   Back Pain  Pertinent negatives include no abdominal pain, bladder incontinence, chest pain, fever, numbness or weakness.   Fibromyalgia  Associated symptoms include arthralgias and neck pain. Pertinent negatives include no abdominal pain, chest pain, chills, fatigue, fever, joint " swelling, myalgias, nausea, numbness, vomiting or weakness.   Arthritis  She reports no joint swelling. Pertinent negatives include no diarrhea, fatigue or fever.        The following portions of the patient's history were reviewed and updated as appropriate: allergies, current medications, past family history, past medical history, past social history, past surgical history and problem list.    Review of Systems   Constitutional:  Negative for chills, fatigue and fever.   HENT:  Negative for hearing loss and trouble swallowing.    Eyes:  Positive for visual disturbance.   Respiratory:  Positive for shortness of breath.    Cardiovascular:  Negative for chest pain.   Gastrointestinal:  Positive for constipation. Negative for abdominal pain, diarrhea, nausea and vomiting.   Genitourinary:  Negative for urinary incontinence.   Musculoskeletal:  Positive for arthralgias, back pain and neck pain. Negative for joint swelling and myalgias.   Neurological:  Positive for headache. Negative for dizziness, weakness and numbness.       Objective   Physical Exam   Constitutional: She is oriented to person, place, and time. She appears well-developed and well-nourished.   HENT:   Head: Normocephalic and atraumatic.   Eyes: Pupils are equal, round, and reactive to light.   Cardiovascular: Normal rate, regular rhythm, normal heart sounds and normal pulses.   No tachycardia or irregularity to pulses today   Pulmonary/Chest: Effort normal and breath sounds normal.   Abdominal: Soft. Bowel sounds are normal. She exhibits no distension. There is no abdominal tenderness.   Musculoskeletal: Tenderness present.      Comments: positive gianni finger test, TTP over b/l SIJ, b/l CARINA, and Gaenslens   Neurological: She is alert and oriented to person, place, and time. She has normal reflexes. She displays normal reflexes. No sensory deficit.   Psychiatric: Her behavior is normal. Thought content normal.         Assessment & Plan   Diagnoses  and all orders for this visit:    1. High risk medication use (Primary)  -     Urine Drug Screen - Urine, Clean Catch; Future    2. Chronic midline low back pain, unspecified whether sciatica present    3. Lumbar radiculopathy    4. Neck pain, chronic    5. Other long term (current) drug therapy    6. Pain in joint involving multiple sites    7. Drug-induced constipation    8. Fibromyalgia    9. Sacroiliitis, not elsewhere classified    10. Chronic right shoulder pain        INspect reviewed, in order. Low risk. Repeat 5/2/24 in order.  Increased to Lyrica 200mg TID, sedating, numbs her lips, but benefit outweighs side effects, not covered by insurance. Began Gabapentin 300mg TID, titrated, tolerating well, increased to 400mg TID, increased to 400mg QID, increased to 600mg QID prn.  Cont Cymbalta 60mg qHS, helping, and sleeping better.  Stopped Norco 5/325mg, failed Percocet 5/325mg, started liquid Norco 7.5/325mg/15ml 15ml QID prn, #1800. Increased to q4h prn, #2700, no longer working. Increased to MS-IR 15mg q6h prn,then MS-Contin 30mg BID, was helping a lot more but tolerant. Rotated to Percocet 10mg QID prn, then q4h prn. Used new refill at q4h then rotated to Xtampza 27mg BID. Began MS-IR 15mg QID prn, working well, continue. Filled 1/10/25.   Patient's symptoms are still adequately managed by current medication regimen, is doing well at this strength and dosage, therefore I will continue to prescribe unchanged as the most appropriate course of treatment.  Failed Mobic 7.5mg qd - BID prn, failed Celebrex.  Began Silenor, denied. Restarted Doxepin 10mg qHS prn.  Restarted Tizanidine 4mg TID prn, continue.  Reorder Relistor 450mg qdaily, failed Miralax, colace, ex-lax.  Began RxAlt #2 compounded cream.  Ordered Flector BID prn. Begin Licart prn.   New referral to Rheum.  Referral to PT for LBP and L hip pain.  Performed b/l SIJ injection. Repeat denied for no recent PT, but patient has failed PT in  past.  Referral to Dr. Seiple for possible h/o TIAs.  Referral to Cardiology for unmanaged (supraventricular?) tachycardia.  Had R CTS injection with Dr. Segundo, still painful, likely needs surgical release.  RTC in 3 months for f/u.

## 2025-02-04 NOTE — PROGRESS NOTES
Specialty Pharmacy Refill Coordination Note     Alyson is a 39 y.o. female contacted today regarding refills of her specialty medication(s).    Specialty medication(s) and dose(s) confirmed: Ubrelvy 100mg  Changes to medications: no  Changes to insurance: no  Reviewed and verified with patient:         Refill Questions      Flowsheet Row Most Recent Value   Changes to allergies? No   Changes to medications? No   New conditions or infections since last clinic visit No   Unplanned office visit, urgent care, ED, or hospital admission in the last 4 weeks  No   How does patient/caregiver feel medication is working? Very good   Financial problems or insurance changes  No   Since the previous refill, were any specialty medication doses or scheduled injections missed or delayed?  No   Does this patient require a clinical escalation to a pharmacist? No            Delivery Questions      Flowsheet Row Most Recent Value   Delivery method UPS   Delivery address verified with patient/caregiver? Yes   Delivery address Home   Number of medications in delivery 1   Medication(s) being filled and delivered Ubrogepant (UBRELVY)   Doses left of specialty medications 3 tabs   Copay verified? Yes   Copay amount $0   Copay form of payment No copayment ($0)   Ship Date 2/4/25   Delivery Date Selection 02/05/25   Signature Required Yes                 Follow-up: 25 day(s)     Raghav Gorman [facility]  2/4/2025   10:46 EST

## 2025-02-25 ENCOUNTER — LAB (OUTPATIENT)
Dept: LAB | Facility: HOSPITAL | Age: 40
End: 2025-02-25
Payer: MEDICAID

## 2025-02-25 DIAGNOSIS — G43.109 MIGRAINE EQUIVALENT SYNDROME: ICD-10-CM

## 2025-02-25 LAB — VALPROATE SERPL-MCNC: 46.4 MCG/ML (ref 50–125)

## 2025-02-25 PROCEDURE — 36415 COLL VENOUS BLD VENIPUNCTURE: CPT

## 2025-02-25 PROCEDURE — 80164 ASSAY DIPROPYLACETIC ACD TOT: CPT

## 2025-02-25 RX ORDER — DIVALPROEX SODIUM 500 MG/1
1500 TABLET, FILM COATED, EXTENDED RELEASE ORAL DAILY
Qty: 90 TABLET | Refills: 11 | Status: SHIPPED | OUTPATIENT
Start: 2025-02-25

## 2025-03-04 ENCOUNTER — SPECIALTY PHARMACY (OUTPATIENT)
Dept: INFUSION THERAPY | Facility: HOSPITAL | Age: 40
End: 2025-03-04
Payer: MEDICAID

## 2025-03-04 NOTE — PROGRESS NOTES
Specialty Pharmacy Patient Management Program  Refill Outreach     Alyson was contacted today regarding refills of their medication(s).    Refill Questions      Flowsheet Row Most Recent Value   Changes to allergies? No   Changes to medications? Yes  [depakote increased to 1500mg qd.]   New conditions or infections since last clinic visit Yes  [inflamation of the bladder/Interstitial cystitis]   If yes, please describe  NA   Unplanned office visit, urgent care, ED, or hospital admission in the last 4 weeks  No   How does patient/caregiver feel medication is working? Very good   Financial problems or insurance changes  No   Since the previous refill, were any specialty medication doses or scheduled injections missed or delayed?  No   Does this patient require a clinical escalation to a pharmacist? Yes  [patient just had scope of bladder and was dx with inflammed bladder.]            Delivery Questions      Flowsheet Row Most Recent Value   Delivery method UPS   Delivery address verified with patient/caregiver? Yes   Delivery address Home   Other address preferred NA   Number of medications in delivery 1   Medication(s) being filled and delivered Ubrogepant (UBRELVY)   Doses left of specialty medications 5 tabs   Copay verified? Yes   Copay amount $0   Copay form of payment No copayment ($0)   Delivery Date Selection 03/06/25   Signature Required Yes                 Follow-up: 25 day(s)     Bev Brian  3/4/2025  13:58 EST

## 2025-03-06 ENCOUNTER — HOSPITAL ENCOUNTER (OUTPATIENT)
Dept: NEUROLOGY | Facility: HOSPITAL | Age: 40
Discharge: HOME | End: 2025-03-06
Admitting: PSYCHIATRY & NEUROLOGY
Payer: MEDICAID

## 2025-03-06 DIAGNOSIS — R55 NEAR SYNCOPE: ICD-10-CM

## 2025-03-06 DIAGNOSIS — G43.109 MIGRAINE EQUIVALENT SYNDROME: ICD-10-CM

## 2025-03-06 PROCEDURE — 95716 VEEG EA 12-26HR CONT MNTR: CPT

## 2025-04-07 ENCOUNTER — SPECIALTY PHARMACY (OUTPATIENT)
Dept: INFUSION THERAPY | Facility: HOSPITAL | Age: 40
End: 2025-04-07
Payer: MEDICAID

## 2025-04-07 NOTE — PROGRESS NOTES
Specialty Pharmacy Patient Management Program  Neurology Reassessment     Alyson Lew is a 40 y.o. female with migraines seen by a Neurology provider and enrolled in the Neurology Patient Management program offered by [facility].  A follow-up outreach was conducted, including assessment of continued therapy appropriateness, medication adherence, and side effect incidence and management for Ubrelvy 100mg.     Changes to Insurance Coverage or Financial Support  [employer] [place] [employer]     Relevant Past Medical History and Comorbidities  Relevant medical history and concomitant health conditions were discussed with the patient. The patient's chart has been reviewed for relevant past medical history and comorbid health conditions and updated as necessary.   Past Medical History:   Diagnosis Date    Allergic     Anemia     Ankle sprain     Many times throughout life    Anxiety     Arthritis     Arthritis of back     On and off most of my life but pain are more frequent    Arthritis of neck     Asthma     Cancer     cervical 2008    Chronic constipation     Chronic pain disorder     CTS (carpal tunnel syndrome) March 2023    Reason for EMG    Depression     Disease of thyroid gland     Dislocation of finger 2004    [facility]    Extremity pain     Fibromyalgia, primary     Fracture, clavicle     When i born    Fracture, finger     [place] [facility] [place]    Fracture, foot 2014    Fractures     Throughout various parts of my life    Headache     Hip arthrosis 10 years    Hx of migraines     Injury of back     Joint pain     Knee sprain 2013    Fell in bathtub    Low back pain     Low back strain     2014    Migraine 2021    Hemiplegic migraines    Neck pain     Peripheral neuropathy     Primary osteoarthritis 01/21/2020    PTSD (post-traumatic stress disorder)     Rheumatoid arthritis 2017    Shortness of breath     Stress fracture 2009    Stroke     Mini heat stroke    Tachycardia     Tear of  meniscus of knee 2013    Wrist sprain      Social History     Socioeconomic History    Marital status: Significant Other   Tobacco Use    Smoking status: Passive Smoke Exposure - Never Smoker     Passive exposure: Yes    Smokeless tobacco: Never    Tobacco comments:     Went to a few puffs a day after fathers passing   Vaping Use    Vaping status: Every Day    Substances: Nicotine, Flavoring    Devices: Pre-filled or refillable cartridge    Passive vaping exposure: Yes   Substance and Sexual Activity    Alcohol use: No    Drug use: No    Sexual activity: Yes     Partners: Male     Birth control/protection: Tubal ligation     Comment: Been with Carmen for 11yrs     Problem list reviewed by Raghav Gorman RP on 4/7/2025 at 12:28 PM    Hospitalizations and Urgent Care Since Last Assessment  ED Visits, Admissions, or Hospitalizations: Per the patient, no recent ED visits or hospitalizations.    Urgent Office Visits: n/a - the patient is scheduled to see Dr. Gipson in August 2025.     Allergies  Known allergies and reactions were discussed with the patient. The patient's chart has been reviewed for allergy information and updated as necessary.   Allergies   Allergen Reactions    Bleach [Sodium Hypochlorite] Anaphylaxis    Adhesive Tape Hives    Latex Hives and Unknown - High Severity     Allergies reviewed by Raghav Gorman RPH on 4/7/2025 at 12:28 PM    Relevant Laboratory Values  Common labs          5/21/2024    04:46 5/21/2024    04:56   Common Labs   Glucose  111    BUN  13    Creatinine  0.67    Sodium  134    Potassium  4.5    Chloride  105    Calcium  9.3    Albumin  4.0    Total Bilirubin  0.2    Alkaline Phosphatase  35    AST (SGOT)  23    ALT (SGPT)  14    WBC 7.92     Hemoglobin 9.1     Hematocrit 29.7     Platelets 318         Lab Assessment  There are no recent or up to date laboratory values in Epic to assess. There are no required ongoing laboratory monitoring  parameters for this drug. The patient has been encouraged to obtain a baseline set of labs for the 2025 calendar year for their own health records.   Current Medication List  This medication list has been reviewed with the patient and evaluated for any interactions or necessary modifications/recommendations, and updated to include all prescription medications, OTC medications, and supplements the patient is currently taking.  This list reflects what is contained in the patient's profile, which has also been marked as reviewed to communicate to other providers it is the most up to date version of the patient's current medication therapy.     Current Outpatient Medications:     albuterol sulfate  (90 Base) MCG/ACT inhaler, INHALE TWO (2) PUFFS BY MOUTH EVERY 4 HOURS AS NEEDED, Disp: 18 g, Rfl: 1    cephalexin (KEFLEX) 250 MG capsule, Take 1 capsule by mouth Daily., Disp: , Rfl:     chlorhexidine (PERIDEX) 0.12 % solution, RINSE BY MOUTH WITH ONE (1) capful TWICE DAILY, Disp: , Rfl:     Diclofenac Epolamine (FLECTOR) 1.3 % patch patch, Apply 1 patch topically to the appropriate area as directed 2 (Two) Times a Day As Needed (back pain)., Disp: 60 patch, Rfl: 2    divalproex (Depakote ER) 500 MG 24 hr tablet, Take 3 tablets by mouth Daily., Disp: 90 tablet, Rfl: 11    doxepin (SINEquan) 10 MG capsule, Take 1 capsule by mouth At Night As Needed for Sleep., Disp: 30 capsule, Rfl: 5    gabapentin (NEURONTIN) 600 MG tablet, Take 1 tablet by mouth 4 (Four) Times a Day., Disp: 120 tablet, Rfl: 5    gabapentin (NEURONTIN) 600 MG tablet, Take 1 tablet by mouth 4 (Four) Times a Day., Disp: 120 tablet, Rfl: 5    ibuprofen (ADVIL,MOTRIN) 600 MG tablet, Take 1 tablet by mouth Every 8 (Eight) Hours As Needed (pain)., Disp: 15 tablet, Rfl: 0    levothyroxine (LEVO-T) 75 MCG tablet, Take 1 tablet by mouth Daily., Disp: 30 tablet, Rfl: 1    Methylnaltrexone Bromide (Relistor) 150 MG tablet, Take 3 tablets by mouth Every Morning  Before Breakfast., Disp: 90 tablet, Rfl: 2    Morphine (MSIR) 15 MG immediate release tablet, Take 1 tablet by mouth Every 6 (Six) Hours As Needed for Severe Pain., Disp: 120 tablet, Rfl: 0    Morphine (MSIR) 15 MG immediate release tablet, Take 1 tablet by mouth Every 6 (Six) Hours As Needed for Severe Pain., Disp: 120 tablet, Rfl: 0    Morphine (MSIR) 15 MG immediate release tablet, Take 1 tablet by mouth Every 6 (Six) Hours As Needed for Severe Pain., Disp: 120 tablet, Rfl: 0    naloxone (NARCAN) 4 MG/0.1ML nasal spray, Call 911. Don't prime. [place] in 1 nostril for overdose. Repeat in 2-3 minutes in other nostril if no or minimal breathing/responsiveness., Disp: 2 each, Rfl: 0    nitrofurantoin, macrocrystal-monohydrate, (MACROBID) 100 MG capsule, , Disp: , Rfl:     omeprazole (PrilOSEC) 40 MG capsule, Take 1 capsule by mouth Daily., Disp: 30 capsule, Rfl: 6    oxyCODONE ER (Xtampza ER) 27 MG capsule extended-release 12 hour , Take 1 capsule by mouth Every 12 (Twelve) Hours. (Patient not taking: Reported on 2/4/2025), Disp: 60 each, Rfl: 0    oxyCODONE ER (Xtampza ER) 27 MG capsule extended-release 12 hour , Take 1 capsule by mouth Every 12 (Twelve) Hours. (Patient not taking: Reported on 2/4/2025), Disp: 60 each, Rfl: 0    oxyCODONE ER (Xtampza ER) 27 MG capsule extended-release 12 hour , Take 1 capsule by mouth Every 12 (Twelve) Hours. (Patient not taking: Reported on 2/4/2025), Disp: 60 each, Rfl: 0    oxyCODONE-acetaminophen (Percocet)  MG per tablet, Take 1 tablet by mouth Every 4 (Four) Hours As Needed for Moderate Pain. (Patient not taking: Reported on 2/4/2025), Disp: 180 tablet, Rfl: 0    oxyCODONE-acetaminophen (Percocet)  MG per tablet, Take 1 tablet by mouth Every 6 (Six) Hours As Needed for Moderate Pain. (Patient not taking: Reported on 2/4/2025), Disp: 120 tablet, Rfl: 0    Pediatric Multiple Vit-C-FA (FLINSTONES GUMMIES OMEGA-3 DHA) chewable tablet, FLINSTONES GUMMIES OMEGA-3 DHA CHEW,  Disp: , Rfl:     prochlorperazine (COMPAZINE) 10 MG tablet, Take 1 tablet by mouth Every 6 (Six) Hours As Needed., Disp: , Rfl:     SUMAtriptan (IMITREX) 20 MG/ACT nasal spray, Use one spray into each nostril(s) as directed by provider Every 2 (Two) Hours As Needed for migraine, Disp: 6 each, Rfl: 11    tiZANidine (ZANAFLEX) 4 MG tablet, Take 1 tablet by mouth Every 8 (Eight) Hours As Needed for Muscle Spasms., Disp: 90 tablet, Rfl: 11    ubrogepant (Ubrelvy) 100 MG tablet, Take 1 tablet by mouth 1 (One) Time As Needed for Migraine. May repeat dose in two hours if symptoms persist. Max 200mg/day., Disp: 16 tablet, Rfl: 5    Medicines reviewed by Raghav Gorman [facility] on 4/7/2025 at 12:28 PM    Drug Interactions  The patient's medication profile has been reviewed for accuracy and assessed for interactions. There are currently no significant interactions to address at this time, however, the patient has been encouraged to notify our office if they plan to start any new medications or supplements.      Adverse Drug Reactions  Medication tolerability: Tolerating with no to minimal ADRs          Medication plan: Continue therapy with normal follow-up  Plan for ADR Management: Per the patient, no reportable adverse events related to Ubrelvy.       Adherence, Self-Administration, and Current Therapy Problems  Adherence related patient's specialty therapy was discussed with the patient. The Adherence segment of this outreach has been reviewed and updated.   Adherence Questions  Linked Medication(s) Assessed: Ubrogepant (UBRELVY)  On average, how many doses/injections does the patient miss per month?: 0  What are the identified reasons for non-adherence or missed doses? : no problems identified  What is the estimated medication adherence level?: %  Based on the patient/caregiver response and refill history, does this patient require an MTP to track adherence improvements?: no    Additional Barriers to  Patient Self-Administration: There are no identifiable barriers to administration for Ubrelvy.   Methods for Supporting Patient Self-Administration: n/a    Recently Close Medication Therapy Problems  No medication therapy recommendations to display  Open Medication Therapy Problems  No medication therapy recommendations to display     Goals of Therapy  Goals related to the patient's specialty therapy was discussed with the patient. The Patient Goals segment of this outreach has been reviewed and updated.    Goals Addressed Today        Specialty Pharmacy General Goal      2/2/24 - Patient reports up to 8 to 10 migraine days per month. Her migraines are often hemiplegic and some occur without her noticing until she wakes up.  She would be pleased with a 75% reduction in symptoms if taken in an appropriate timeframe.     7/11/24 - patient did not see the desired symptom reduction for Nurtec and hopes for at least a 50% reduction in symptoms with the first dose of Ubrelvy.  The patient is also hopeful that the option of a second dose could help with more of her treatment resistant migraines.     10/9/24 - the patient has seen a better reduction with Ubrelvy than with Nurtec, however, the 50% reduction in symptoms typically takes both doses of Ubrelvy. Usually after her second dose, her migraine is tolerable and able to perform normal ADLs.     4/7/25 - The patient reports she is still seeing positive responses to Ubrelvy when needed with symptom reduction anywhere from 50-75% within an hour of her fist dose. Very rarely does she need to take a second dose and reports no side effects to therapy. l               Quality of Life Assessment   Quality of Life related to the patient's enrollment in the patient management program and services provided was discussed with the patient. The QOL segment of this outreach has been reviewed and updated.   Quality of Life Improvement Scale: 9-A good deal better    Reassessment Plan &  Follow-Up  Medication Therapy Changes: There are no medication therapy changes to make at this time and the patient is to continue Ubrelvy 100mg as needed with a max daily dose of 200mg.   Related Plans, Therapy Recommendations, or Issues to Be Addressed: The patient states she is continuing to see better responses on Ubrelvy than she did on Nurtec.  There is no indication that Ubrelvy will lose effectiveness over the next several months, however, if she needs more than the 10 tablets allotted every month we can provide samples if necessary.   Pharmacist to perform regular reassessments no more than (6) months from the previous assessment.  Care Coordinator to set up future refill outreaches, coordinate prescription delivery, and escalate clinical questions to pharmacist.     Attestation  Therapeutic appropriateness: Appropriate  I attest the patient was actively involved in and has agreed to the above plan of care. If the prescribed therapy is at any point deemed not appropriate based on the current or future assessments, a consultation will be initiated with the patient's specialty care provider to determine the best course of action. The revised plan of therapy will be documented along with any additional patient education provided. Discussed aforementioned material with patient by phone.    Tyrone Gorman, CarolyneD  Clinic Specialty Pharmacist, Neurology  4/7/2025  12:31 EDT

## 2025-04-07 NOTE — PROGRESS NOTES
Specialty Pharmacy Refill Coordination Note     Alyson is a 40 y.o. female contacted today regarding refills of her specialty medication(s).    Specialty medication(s) and dose(s) confirmed: Ubrelvy 100mg  Changes to medications: no  Changes to insurance: no  Reviewed and verified with patient:  Allergies  Meds  Problems         Refill Questions      Flowsheet Row Most Recent Value   Changes to allergies? No   Changes to medications? No   New conditions or infections since last clinic visit No   Unplanned office visit, urgent care, ED, or hospital admission in the last 4 weeks  No   How does patient/caregiver feel medication is working? Very good   Financial problems or insurance changes  No   Since the previous refill, were any specialty medication doses or scheduled injections missed or delayed?  No   Does this patient require a clinical escalation to a pharmacist? No            Delivery Questions      Flowsheet Row Most Recent Value   Delivery method UPS   Delivery address verified with patient/caregiver? Yes   Delivery address Home   Number of medications in delivery 1   Medication(s) being filled and delivered Ubrogepant (UBRELVY)   Doses left of specialty medications 3 tabs   Copay verified? Yes   Copay amount $0   Copay form of payment No copayment ($0)   Delivery Date Selection 04/08/25   Signature Required Yes   Do you consent to receive electronic handouts?  Yes                 Follow-up: 25 day(s)     Raghav Gorman [facility]  4/7/2025   12:47 EDT

## 2025-05-02 ENCOUNTER — SPECIALTY PHARMACY (OUTPATIENT)
Dept: INFUSION THERAPY | Facility: HOSPITAL | Age: 40
End: 2025-05-02
Payer: MEDICAID

## 2025-05-02 ENCOUNTER — OFFICE VISIT (OUTPATIENT)
Dept: PAIN MEDICINE | Facility: CLINIC | Age: 40
End: 2025-05-02
Payer: MEDICAID

## 2025-05-02 ENCOUNTER — TELEPHONE (OUTPATIENT)
Dept: PAIN MEDICINE | Facility: CLINIC | Age: 40
End: 2025-05-02

## 2025-05-02 VITALS
RESPIRATION RATE: 16 BRPM | WEIGHT: 176 LBS | OXYGEN SATURATION: 98 % | BODY MASS INDEX: 31.18 KG/M2 | DIASTOLIC BLOOD PRESSURE: 59 MMHG | SYSTOLIC BLOOD PRESSURE: 100 MMHG | HEART RATE: 75 BPM

## 2025-05-02 DIAGNOSIS — M46.1 SACROILIITIS, NOT ELSEWHERE CLASSIFIED: ICD-10-CM

## 2025-05-02 DIAGNOSIS — K59.03 DRUG-INDUCED CONSTIPATION: ICD-10-CM

## 2025-05-02 DIAGNOSIS — M79.601 PAIN IN RIGHT ARM: ICD-10-CM

## 2025-05-02 DIAGNOSIS — G89.29 CHRONIC MIDLINE LOW BACK PAIN, UNSPECIFIED WHETHER SCIATICA PRESENT: Primary | ICD-10-CM

## 2025-05-02 DIAGNOSIS — G89.29 CHRONIC RIGHT SHOULDER PAIN: ICD-10-CM

## 2025-05-02 DIAGNOSIS — Z79.899 OTHER LONG TERM (CURRENT) DRUG THERAPY: ICD-10-CM

## 2025-05-02 DIAGNOSIS — M79.10 MYALGIA: ICD-10-CM

## 2025-05-02 DIAGNOSIS — G89.29 NECK PAIN, CHRONIC: ICD-10-CM

## 2025-05-02 DIAGNOSIS — M54.2 NECK PAIN, CHRONIC: ICD-10-CM

## 2025-05-02 DIAGNOSIS — M54.50 CHRONIC MIDLINE LOW BACK PAIN, UNSPECIFIED WHETHER SCIATICA PRESENT: Primary | ICD-10-CM

## 2025-05-02 DIAGNOSIS — M25.511 CHRONIC RIGHT SHOULDER PAIN: ICD-10-CM

## 2025-05-02 DIAGNOSIS — M54.16 LUMBAR RADICULOPATHY: ICD-10-CM

## 2025-05-02 DIAGNOSIS — M25.50 PAIN IN JOINT INVOLVING MULTIPLE SITES: ICD-10-CM

## 2025-05-02 RX ORDER — GABAPENTIN 600 MG/1
600 TABLET ORAL 4 TIMES DAILY
Qty: 120 TABLET | Refills: 5 | Status: SHIPPED | OUTPATIENT
Start: 2025-05-02

## 2025-05-02 RX ORDER — MORPHINE SULFATE 15 MG/1
15 TABLET ORAL EVERY 6 HOURS PRN
Qty: 120 TABLET | Refills: 0 | Status: SHIPPED | OUTPATIENT
Start: 2025-05-02

## 2025-05-02 RX ORDER — OXYBUTYNIN CHLORIDE 10 MG/1
10 TABLET, EXTENDED RELEASE ORAL DAILY
COMMUNITY

## 2025-05-02 NOTE — PROGRESS NOTES
"Subjective   Alyson Lew is a 40 y.o. female.     History of Present Illness  all over pain, especially back, neck and bilateral hip pain, right knee. Dz with fibromyalgia and inflammatory arthritis by Rheum, on DMARDs and Lyrica 100mg BID at initial visit with poor control, pain worsening, 10/10 at worst, 7/10 at best, always present, prevents work and housework, burning, sharp, varies. Prior notes reviewed, referred for worsening pain, does not have time for PT. Increased to Lyrica 100mg BID then TID, started Celebrex, Cymbalta 30mg then 60mg qHS, fewer flares but still severe fibromyalgia pain, also LBP and b/l hip pain. Started Norco 5/325mg, mostly taking qHS, some days BID with worsening pain with cold weather, becoming less effective, rotated to Percocet 5/325mg. C/o insomnia. Went to ED with severe pain not controlled with Percocet 5/325mg BID prn, now QID prn. Had gastric sleeve surgery, liquid Norco worked well, restarted Percocet with severe N/V, vomited up complete prescription. Started liquid Norco, working better, but pain worsening, having difficulty with ADLs, increased Norco and Lyrica, then MS-IR 15mg QID prn with pill . Worsening L \"hip\" pain, insomnia, constipation. Requesting NSAID. Had toni. Has FH of autoimmune disorders and MS, hasn't heard from Rheum. Needs PT before having injection, worsening SIJ pain even with PT, had b/l SIJ injections, helped, would like to repeat.    Also reports \"episodes\" where she loses the ability to speak c/w Broca's aphasia, resolves over 3-4 days spontaneously, had CT-brain in ED which was negative. Has h/o tachycardia, failed ablation, is not currently medicated.   Back Pain  Associated symptoms: no abdominal pain, no bladder incontinence, no chest pain, no fever, no numbness and no weakness    Fibromyalgia  Associated symptoms include arthralgias and neck pain. Pertinent negatives include no abdominal pain, chest pain, chills, fatigue, fever, joint " swelling, myalgias, nausea, numbness, vomiting or weakness.   Arthritis  She reports no joint swelling. Pertinent negatives include no diarrhea, fatigue or fever.        The following portions of the patient's history were reviewed and updated as appropriate: allergies, current medications, past family history, past medical history, past social history, past surgical history and problem list.    Review of Systems   Constitutional:  Negative for chills, fatigue and fever.   HENT:  Negative for hearing loss and trouble swallowing.    Eyes:  Positive for visual disturbance.   Respiratory:  Positive for shortness of breath.    Cardiovascular:  Negative for chest pain.   Gastrointestinal:  Positive for constipation. Negative for abdominal pain, diarrhea, nausea and vomiting.   Genitourinary:  Negative for urinary incontinence.   Musculoskeletal:  Positive for arthralgias, back pain and neck pain. Negative for joint swelling and myalgias.   Neurological:  Positive for headache. Negative for dizziness, weakness and numbness.       Objective   Physical Exam   Constitutional: She is oriented to person, place, and time. She appears well-developed and well-nourished.   HENT:   Head: Normocephalic and atraumatic.   Eyes: Pupils are equal, round, and reactive to light.   Cardiovascular: Normal rate, regular rhythm, normal heart sounds and normal pulses.   No tachycardia or irregularity to pulses today   Pulmonary/Chest: Effort normal and breath sounds normal.   Abdominal: Soft. Bowel sounds are normal. She exhibits no distension. There is no abdominal tenderness.   Musculoskeletal: Tenderness present.      Comments: positive gianni finger test, TTP over b/l SIJ, b/l CARINA, and Gaenslens   Neurological: She is alert and oriented to person, place, and time. She has normal reflexes. She displays normal reflexes. No sensory deficit.   Psychiatric: Her behavior is normal. Thought content normal.         Assessment & Plan   Diagnoses  and all orders for this visit:    1. Chronic midline low back pain, unspecified whether sciatica present (Primary)    2. Lumbar radiculopathy    3. Neck pain, chronic    4. Other long term (current) drug therapy    5. Drug-induced constipation    6. Pain in joint involving multiple sites    7. Pain in right arm    8. Sacroiliitis, not elsewhere classified    9. Chronic right shoulder pain        INspect reviewed, in order. Low risk. Repeat 2/4/25 in order.  Increased to Lyrica 200mg TID, sedating, numbs her lips, but benefit outweighs side effects, not covered by insurance. Began Gabapentin 300mg TID, titrated, tolerating well, increased to 400mg TID, increased to 400mg QID, increased to 600mg QID prn.  Cont Cymbalta 60mg qHS, helping, and sleeping better.  Stopped Norco 5/325mg, failed Percocet 5/325mg, started liquid Norco 7.5/325mg/15ml 15ml QID prn, #1800. Increased to q4h prn, #2700, no longer working. Increased to MS-IR 15mg q6h prn,then MS-Contin 30mg BID, was helping a lot more but tolerant. Rotated to Percocet 10mg QID prn, then q4h prn. Used new refill at q4h then rotated to Xtampza 27mg BID. Began MS-IR 15mg QID prn, working well, continue. Filled 4/16/25.   Patient's symptoms are still adequately managed by current medication regimen, is doing well at this strength and dosage, therefore I will continue to prescribe unchanged as the most appropriate course of treatment.  Failed Mobic 7.5mg qd - BID prn, failed Celebrex.  Began Silenor, denied. Restarted Doxepin 10mg qHS prn.  Restarted Tizanidine 4mg TID prn, continue.  Reorder Relistor 450mg qdaily, failed Miralax, colace, ex-lax.  Began RxAlt #2 compounded cream.  Ordered Flector BID prn. Begin Licart prn.   New referral to Rheum.  Referral to PT for LBP and L hip pain.  Performed b/l SIJ injection. Repeat denied for no recent PT, but patient has failed PT in past.  Schedule TPIs of b/l mid-upper paraspinals and traps.  Referral to Dr. Seiple for  possible h/o TIAs.  Referral to Cardiology for unmanaged (supraventricular?) tachycardia.  Had R CTS injection with Dr. Segundo, still painful, likely needs surgical release.  RTC for TPIs then in 3 months for f/u.                          Physical Exam  Constitutional:       Appearance: She is well-developed.   HENT:      Head: Normocephalic and atraumatic.   Eyes:      Pupils: Pupils are equal, round, and reactive to light.   Cardiovascular:      Rate and Rhythm: Normal rate and regular rhythm.      Pulses: Normal pulses.      Heart sounds: Normal heart sounds.      Comments: No tachycardia or irregularity to pulses today  Pulmonary:      Effort: Pulmonary effort is normal.      Breath sounds: Normal breath sounds.   Abdominal:      General: Bowel sounds are normal. There is no distension.      Palpations: Abdomen is soft.      Tenderness: There is no abdominal tenderness.   Musculoskeletal:         General: Tenderness present.      Comments: positive gianni finger test, TTP over b/l SIJ, b/l CARINA, and Gaenslens   Neurological:      Mental Status: She is alert and oriented to person, place, and time.      Sensory: No sensory deficit.      Deep Tendon Reflexes: Reflexes are normal and symmetric. Reflexes normal.   Psychiatric:         Behavior: Behavior normal.         Thought Content: Thought content normal.

## 2025-05-02 NOTE — PROGRESS NOTES
Specialty Pharmacy Patient Management Program  Refill Outreach     Alyson was contacted today regarding refills of their medication(s).    Refill Questions      Flowsheet Row Most Recent Value   Changes to allergies? No   Changes to medications? Yes  [2 new meds added for bladder]   New conditions or infections since last clinic visit No   If yes, please describe  n/a   Unplanned office visit, urgent care, ED, or hospital admission in the last 4 weeks  No   How does patient/caregiver feel medication is working? Very good   Financial problems or insurance changes  No   Since the previous refill, were any specialty medication doses or scheduled injections missed or delayed?  No   Does this patient require a clinical escalation to a pharmacist? No            Delivery Questions      Flowsheet Row Most Recent Value   Delivery method UPS   Delivery address verified with patient/caregiver? Yes   Delivery address Home   Other address preferred n/a   Number of medications in delivery 1   Medication(s) being filled and delivered Ubrogepant (UBRELVY)   Doses left of specialty medications 3   Copay verified? Yes   Copay amount 0   Copay form of payment No copayment ($0)   Delivery Date Selection 05/06/25   Signature Required Yes   Do you consent to receive electronic handouts?  Yes                 Follow-up: 25 day(s)     Kamryn Lam, Pharmacy Technician  5/2/2025  09:24 EDT

## 2025-05-20 ENCOUNTER — PROCEDURE VISIT (OUTPATIENT)
Dept: PAIN MEDICINE | Facility: CLINIC | Age: 40
End: 2025-05-20
Payer: MEDICAID

## 2025-05-20 VITALS
DIASTOLIC BLOOD PRESSURE: 71 MMHG | SYSTOLIC BLOOD PRESSURE: 110 MMHG | HEART RATE: 63 BPM | RESPIRATION RATE: 16 BRPM | OXYGEN SATURATION: 100 %

## 2025-05-20 DIAGNOSIS — M79.10 MYALGIA: Primary | ICD-10-CM

## 2025-05-20 RX ORDER — METHYLPREDNISOLONE ACETATE 40 MG/ML
40 INJECTION, SUSPENSION INTRA-ARTICULAR; INTRALESIONAL; INTRAMUSCULAR; SOFT TISSUE ONCE
Status: COMPLETED | OUTPATIENT
Start: 2025-05-20 | End: 2025-05-20

## 2025-05-20 RX ORDER — LIDOCAINE HYDROCHLORIDE 10 MG/ML
10 INJECTION, SOLUTION EPIDURAL; INFILTRATION; INTRACAUDAL; PERINEURAL ONCE
Status: COMPLETED | OUTPATIENT
Start: 2025-05-20 | End: 2025-05-20

## 2025-05-20 RX ADMIN — LIDOCAINE HYDROCHLORIDE 10 ML: 10 INJECTION, SOLUTION EPIDURAL; INFILTRATION; INTRACAUDAL; PERINEURAL at 14:13

## 2025-05-20 RX ADMIN — METHYLPREDNISOLONE ACETATE 40 MG: 40 INJECTION, SUSPENSION INTRA-ARTICULAR; INTRALESIONAL; INTRAMUSCULAR; SOFT TISSUE at 14:15

## 2025-05-20 NOTE — PROGRESS NOTES
"Subjective   Alyson Lew is a 40 y.o. female.     History of Present Illness  all over pain, especially back, neck and bilateral hip pain, right knee. Dz with fibromyalgia and inflammatory arthritis by Rheum, on DMARDs and Lyrica 100mg BID at initial visit with poor control, pain worsening, 10/10 at worst, 7/10 at best, always present, prevents work and housework, burning, sharp, varies. Prior notes reviewed, referred for worsening pain, does not have time for PT. Increased to Lyrica 100mg BID then TID, started Celebrex, Cymbalta 30mg then 60mg qHS, fewer flares but still severe fibromyalgia pain, also LBP and b/l hip pain. Started Norco 5/325mg, mostly taking qHS, some days BID with worsening pain with cold weather, becoming less effective, rotated to Percocet 5/325mg. C/o insomnia. Went to ED with severe pain not controlled with Percocet 5/325mg BID prn, now QID prn. Had gastric sleeve surgery, liquid Norco worked well, restarted Percocet with severe N/V, vomited up complete prescription. Started liquid Norco, working better, but pain worsening, having difficulty with ADLs, increased Norco and Lyrica, then MS-IR 15mg QID prn with pill . Worsening L \"hip\" pain, insomnia, constipation. Requesting NSAID. Had toni. Has FH of autoimmune disorders and MS, hasn't heard from Rheum. Needs PT before having injection, worsening SIJ pain even with PT, had b/l SIJ injections, helped, would like to repeat.    Also reports \"episodes\" where she loses the ability to speak c/w Broca's aphasia, resolves over 3-4 days spontaneously, had CT-brain in ED which was negative. Has h/o tachycardia, failed ablation, is not currently medicated.   Back Pain  Associated symptoms: no abdominal pain, no bladder incontinence, no chest pain, no fever, no numbness and no weakness    Fibromyalgia  Associated symptoms include arthralgias and neck pain. Pertinent negatives include no abdominal pain, chest pain, chills, fatigue, fever, joint " swelling, myalgias, nausea, numbness, vomiting or weakness.   Arthritis  She reports no joint swelling. Pertinent negatives include no diarrhea, fatigue or fever.        The following portions of the patient's history were reviewed and updated as appropriate: allergies, current medications, past family history, past medical history, past social history, past surgical history and problem list.    Review of Systems   Constitutional:  Negative for chills, fatigue and fever.   HENT:  Negative for hearing loss and trouble swallowing.    Eyes:  Positive for visual disturbance.   Respiratory:  Positive for shortness of breath.    Cardiovascular:  Negative for chest pain.   Gastrointestinal:  Positive for constipation. Negative for abdominal pain, diarrhea, nausea and vomiting.   Genitourinary:  Negative for urinary incontinence.   Musculoskeletal:  Positive for arthralgias, back pain and neck pain. Negative for joint swelling and myalgias.   Neurological:  Positive for headache. Negative for dizziness, weakness and numbness.       Objective   Physical Exam   Constitutional: She is oriented to person, place, and time. She appears well-developed and well-nourished.   HENT:   Head: Normocephalic and atraumatic.   Eyes: Pupils are equal, round, and reactive to light.   Cardiovascular: Normal rate, regular rhythm, normal heart sounds and normal pulses.   No tachycardia or irregularity to pulses today   Pulmonary/Chest: Effort normal and breath sounds normal.   Abdominal: Soft. Bowel sounds are normal. She exhibits no distension. There is no abdominal tenderness.   Musculoskeletal: Tenderness present.      Comments: positive gianni finger test, TTP over b/l SIJ, b/l CARINA, and Gaenslens   Neurological: She is alert and oriented to person, place, and time. She has normal reflexes. She displays normal reflexes. No sensory deficit.   Psychiatric: Her behavior is normal. Thought content normal.         Assessment & Plan   Diagnoses  and all orders for this visit:    1. Myalgia (Primary)        INspect reviewed, in order. Low risk. Repeat 2/4/25 in order.  Increased to Lyrica 200mg TID, sedating, numbs her lips, but benefit outweighs side effects, not covered by insurance. Began Gabapentin 300mg TID, titrated, tolerating well, increased to 400mg TID, increased to 400mg QID, increased to 600mg QID prn.  Cont Cymbalta 60mg qHS, helping, and sleeping better.  Stopped Norco 5/325mg, failed Percocet 5/325mg, started liquid Norco 7.5/325mg/15ml 15ml QID prn, #1800. Increased to q4h prn, #2700, no longer working. Increased to MS-IR 15mg q6h prn,then MS-Contin 30mg BID, was helping a lot more but tolerant. Rotated to Percocet 10mg QID prn, then q4h prn. Used new refill at q4h then rotated to Xtampza 27mg BID. Began MS-IR 15mg QID prn, working well, continue. Filled 5/16/25.   Patient's symptoms are still adequately managed by current medication regimen, is doing well at this strength and dosage, therefore I will continue to prescribe unchanged as the most appropriate course of treatment.  Failed Mobic 7.5mg qd - BID prn, failed Celebrex.  Began Silenor, denied. Restarted Doxepin 10mg qHS prn.  Restarted Tizanidine 4mg TID prn, continue.  Reorder Relistor 450mg qdaily, failed Miralax, colace, ex-lax.  Began RxAlt #2 compounded cream.  Ordered Flector BID prn. Begin Licart prn.   New referral to Rheum.  Referral to PT for LBP and L hip pain.  Performed b/l SIJ injection. Repeat denied for no recent PT, but patient has failed PT in past.  Perform TPIs of b/l mid-upper paraspinals and traps.  Referral to Dr. Seiple for possible h/o TIAs.  Referral to Cardiology for unmanaged (supraventricular?) tachycardia.  Had R CTS injection with Dr. Segundo, still painful, likely needs surgical release.  RTC for TPIs then in 3 months for f/u.      Trigger Point Injections    PREOPERATIVE DIAGNOSIS: Myofascial pain    POSTOPERATIVE DIAGNOSIS: Myofascial pain    PROCEDURE  "PERFORMED:   Trigger point injections    The patient understands the risks and benefits of the procedure and wishes to proceed. The patient was seen in the preoperative area. Patient's consent was obtained and updated. Vitals were taken. Patient was then placed in a seated position for the injection. 10 sites marked, 5 left and 5 right in cervical and thoracic paraspinal muscles and trapezius muscles. Each site prepped with alcohol swabs x 4, then injected using a 25g 1.5\" needle using 0.5-1ml of solution of Depomedrol 10mg and Lidocaine 1% 9ml after negative aspiration followed by needling. The patient tolerated with no toño-procedural complications.  A sterile dressing was placed over the puncture sites.                         Physical Exam  Constitutional:       Appearance: She is well-developed.   HENT:      Head: Normocephalic and atraumatic.   Eyes:      Pupils: Pupils are equal, round, and reactive to light.   Cardiovascular:      Rate and Rhythm: Normal rate and regular rhythm.      Pulses: Normal pulses.      Heart sounds: Normal heart sounds.      Comments: No tachycardia or irregularity to pulses today  Pulmonary:      Effort: Pulmonary effort is normal.      Breath sounds: Normal breath sounds.   Abdominal:      General: Bowel sounds are normal. There is no distension.      Palpations: Abdomen is soft.      Tenderness: There is no abdominal tenderness.   Musculoskeletal:         General: Tenderness present.      Comments: positive gianni finger test, TTP over b/l SIJ, b/l CARINA, and Gaenslens   Neurological:      Mental Status: She is alert and oriented to person, place, and time.      Sensory: No sensory deficit.      Deep Tendon Reflexes: Reflexes are normal and symmetric. Reflexes normal.   Psychiatric:         Behavior: Behavior normal.         Thought Content: Thought content normal.       "

## 2025-06-30 ENCOUNTER — SPECIALTY PHARMACY (OUTPATIENT)
Dept: INFUSION THERAPY | Facility: HOSPITAL | Age: 40
End: 2025-06-30
Payer: MEDICAID

## 2025-06-30 NOTE — PROGRESS NOTES
Specialty Pharmacy Patient Management Program  Refill Outreach   CCA expires 6/30/26        Kamryn Lam, Pharmacy Technician  6/30/2025  12:56 EDT

## 2025-07-03 ENCOUNTER — SPECIALTY PHARMACY (OUTPATIENT)
Dept: INFUSION THERAPY | Facility: HOSPITAL | Age: 40
End: 2025-07-03
Payer: MEDICAID

## 2025-07-03 NOTE — PROGRESS NOTES
Specialty Pharmacy Patient Management Program  Refill Outreach   Ubrelvy 100 mg #10 PA approved until 7/2/26     Kamryn Lam, Pharmacy Technician  7/3/2025  08:46 EDT

## 2025-07-08 ENCOUNTER — SPECIALTY PHARMACY (OUTPATIENT)
Dept: INFUSION THERAPY | Facility: HOSPITAL | Age: 40
End: 2025-07-08
Payer: MEDICAID

## 2025-07-08 NOTE — PROGRESS NOTES
"   Specialty Pharmacy Patient Management Program  Refill Outreach     Alyson \"Alyson Lew\" was contacted today regarding refills of their medication(s).    Refill Questions      Flowsheet Row Most Recent Value   Changes to allergies? No   Changes to medications? No   New conditions or infections since last clinic visit No   If yes, please describe  n/a   Unplanned office visit, urgent care, ED, or hospital admission in the last 4 weeks  No   How does patient/caregiver feel medication is working? Very good   Financial problems or insurance changes  No   Since the previous refill, were any specialty medication doses or scheduled injections missed or delayed?  No   Does this patient require a clinical escalation to a pharmacist? Yes  [Patient said Ubrelvy works 50% of the time - last headache she had to take a second Ubrelvy dose as well as her Sumatriptan nasal spray. Ended up having a seizure afterwards (a couple months ago)]            Delivery Questions      Flowsheet Row Most Recent Value   Delivery method UPS   Delivery address verified with patient/caregiver? Yes   Delivery address Home   Other address preferred n/a   Number of medications in delivery 1   Medication(s) being filled and delivered Ubrogepant (UBRELVY)   Doses left of specialty medications 3   Copay verified? Yes   Copay amount 0   Copay form of payment No copayment ($0)   Delivery Date Selection 07/09/25   Signature Required Yes   Do you consent to receive electronic handouts?  Yes                 Follow-up: 25 day(s)     Kamryn Lam, Pharmacy Technician  7/8/2025  09:52 EDT    "

## 2025-07-09 ENCOUNTER — SPECIALTY PHARMACY (OUTPATIENT)
Dept: INFUSION THERAPY | Facility: HOSPITAL | Age: 40
End: 2025-07-09
Payer: MEDICAID

## 2025-07-14 ENCOUNTER — TELEPHONE (OUTPATIENT)
Dept: PAIN MEDICINE | Facility: CLINIC | Age: 40
End: 2025-07-14
Payer: MEDICAID

## 2025-07-14 DIAGNOSIS — M54.50 CHRONIC MIDLINE LOW BACK PAIN, UNSPECIFIED WHETHER SCIATICA PRESENT: ICD-10-CM

## 2025-07-14 DIAGNOSIS — G89.29 CHRONIC MIDLINE LOW BACK PAIN, UNSPECIFIED WHETHER SCIATICA PRESENT: ICD-10-CM

## 2025-07-14 RX ORDER — MORPHINE SULFATE 15 MG/1
15 TABLET ORAL EVERY 6 HOURS PRN
Qty: 120 TABLET | Refills: 0 | Status: SHIPPED | OUTPATIENT
Start: 2025-07-16

## 2025-07-14 NOTE — TELEPHONE ENCOUNTER
I can send it to [facility]. Patient's pain is still well-managed by current medication regimen, is doing well at this strength and dosage, therefore I will continue to prescribe unchanged as the most appropriate course of treatment.

## 2025-07-14 NOTE — TELEPHONE ENCOUNTER
"Provider: DR BRO     Caller: Alyson Lew \"Alyson Lew\"    Relationship to Patient: Self    Pharmacy: N/A     Phone Number: [phone]    Reason for Call: PATIENT STATES THAT HER PHARMACY ALL [facility] ARE OUT OF HER MORPHINE 15 MGS UNTIL SEPTEMBER DUE TO BACK ORDER. [facility] WILL NOT FILL IT DUE TO BEING TOO STRONG, [facility] DOES NOT TAKE HER INSURANCE AND SHE CAN NOT GO TO [facility]. SHE WOULD LIKE TO KNOW IF [facility] [place] MAY HAVE THIS MEDICATION AND IF IT COULD BE SENT THERE OR IF THE OFFICE MAY KNOW OF ANOTHER PHARMACY THAT DOES HAVE THIS IN STOCK, OR ADDITIONALLY IS THERE A SUBSTITUTE THAT COULD BE GIVEN UNTIL THIS IS IN BACK IN STOCK. PATIENT HAS 3 DAYS OR LESS WORTH OF MEDICATION. PLEASE CALL TO DISCUSS.        "

## 2025-07-15 ENCOUNTER — TELEPHONE (OUTPATIENT)
Dept: PAIN MEDICINE | Facility: CLINIC | Age: 40
End: 2025-07-15
Payer: MEDICAID

## 2025-07-15 RX ORDER — MORPHINE SULFATE 30 MG/1
30 TABLET, FILM COATED, EXTENDED RELEASE ORAL 2 TIMES DAILY
Qty: 60 TABLET | Refills: 0 | Status: SHIPPED | OUTPATIENT
Start: 2025-07-16

## 2025-07-15 NOTE — TELEPHONE ENCOUNTER
"  Hub staff attempted to follow warm transfer process and was unsuccessful     Caller: Alyson Lew \"Alyson Lew\"    Relationship to patient: Self    Best call back number: [phone]    Patient is needing: PATIENT IS CALLING BACK TO LET HALIE KNOW THAT [facility] DOES NOT HAVE HER MEDICATION IN STOCK (MORPHINE 15 MGS IMMEDIATE RELEASE) THEY DO HAVE THE EXTENDED RELEASE IN STOCK IF THIS WOULD BE AN OPTION FOR HER. SHE SAYS \"LITERALLY NO ONE HAS THIS MEDICATION IN STOCK\" SHE IS GOING TO CHECK WITH [facility] AND LET US KNOW IF THEY ARE ABLE TO FILL IT.             "

## 2025-07-15 NOTE — TELEPHONE ENCOUNTER
"Provider: DR BRO     Caller: Alyson Lew \"Alyson Lew\"    Relationship to Patient: Self    Phone Number: [phone]    Reason for Call: PATIENT DID CALL [facility] IN [facility] PER INSTRUCTIONS FROM ASHLEY (NUMBER GIVEN TO PATIENT BY HALIE IN THE OFFICE) FROM [facility]. THEY WILL NOT GIVE THE PATIENT INFORMATION REGARDING MEDICATION IN STOCK DUE TO SAFETY REASONS. THEY WILL ONLY SPEAK TO A PHYSICIAN. SHE FEELS AT THIS POINT THE EXTENDED RELEASE IN STOCK AT [facility] MAY BE HER BEST OPTION. PLEASE ADVISE AND CALL TO DISCUSS THIS.       "

## 2025-07-29 ENCOUNTER — SPECIALTY PHARMACY (OUTPATIENT)
Dept: INFUSION THERAPY | Facility: HOSPITAL | Age: 40
End: 2025-07-29
Payer: MEDICAID

## 2025-07-29 NOTE — PROGRESS NOTES
"   Specialty Pharmacy Patient Management Program  Refill Outreach     Alyson \"Alyson Lew\" was contacted today regarding refills of their medication(s).    Refill Questions      Flowsheet Row Most Recent Value   Changes to allergies? No   Changes to medications? No   New conditions or infections since last clinic visit No   If yes, please describe  n/a   Unplanned office visit, urgent care, ED, or hospital admission in the last 4 weeks  No   How does patient/caregiver feel medication is working? Very good   Financial problems or insurance changes  No   Since the previous refill, were any specialty medication doses or scheduled injections missed or delayed?  No   Does this patient require a clinical escalation to a pharmacist? No            Delivery Questions      Flowsheet Row Most Recent Value   Delivery method UPS   Delivery address verified with patient/caregiver? Yes   Delivery address Home   Other address preferred n/a   Number of medications in delivery 1   Medication(s) being filled and delivered Ubrogepant (UBRELVY)   Doses left of specialty medications 0   Copay verified? Yes   Copay amount 0   Copay form of payment No copayment ($0)   Delivery Date Selection 07/30/25   Signature Required Yes   Do you consent to receive electronic handouts?  Yes                 Follow-up: 25 day(s)     Kamryn Lam, Pharmacy Technician  7/29/2025  12:27 EDT    "

## 2025-07-30 ENCOUNTER — SPECIALTY PHARMACY (OUTPATIENT)
Dept: INFUSION THERAPY | Facility: HOSPITAL | Age: 40
End: 2025-07-30
Payer: MEDICAID

## 2025-08-08 ENCOUNTER — OFFICE VISIT (OUTPATIENT)
Dept: PAIN MEDICINE | Facility: CLINIC | Age: 40
End: 2025-08-08
Payer: MEDICAID

## 2025-08-08 VITALS
OXYGEN SATURATION: 96 % | WEIGHT: 176 LBS | SYSTOLIC BLOOD PRESSURE: 106 MMHG | DIASTOLIC BLOOD PRESSURE: 57 MMHG | HEART RATE: 80 BPM | BODY MASS INDEX: 31.18 KG/M2 | RESPIRATION RATE: 16 BRPM

## 2025-08-08 DIAGNOSIS — M79.601 PAIN IN RIGHT ARM: ICD-10-CM

## 2025-08-08 DIAGNOSIS — M54.16 LUMBAR RADICULOPATHY: ICD-10-CM

## 2025-08-08 DIAGNOSIS — G89.29 NECK PAIN, CHRONIC: ICD-10-CM

## 2025-08-08 DIAGNOSIS — M54.2 NECK PAIN, CHRONIC: ICD-10-CM

## 2025-08-08 DIAGNOSIS — M46.1 SACROILIITIS, NOT ELSEWHERE CLASSIFIED: ICD-10-CM

## 2025-08-08 DIAGNOSIS — Z79.899 OTHER LONG TERM (CURRENT) DRUG THERAPY: ICD-10-CM

## 2025-08-08 DIAGNOSIS — M54.50 CHRONIC MIDLINE LOW BACK PAIN, UNSPECIFIED WHETHER SCIATICA PRESENT: Primary | ICD-10-CM

## 2025-08-08 DIAGNOSIS — M79.10 MYALGIA: ICD-10-CM

## 2025-08-08 DIAGNOSIS — M79.7 FIBROMYALGIA: ICD-10-CM

## 2025-08-08 DIAGNOSIS — K59.03 DRUG-INDUCED CONSTIPATION: ICD-10-CM

## 2025-08-08 DIAGNOSIS — M25.50 PAIN IN JOINT INVOLVING MULTIPLE SITES: ICD-10-CM

## 2025-08-08 DIAGNOSIS — G89.29 CHRONIC MIDLINE LOW BACK PAIN, UNSPECIFIED WHETHER SCIATICA PRESENT: Primary | ICD-10-CM

## 2025-08-08 RX ORDER — GABAPENTIN 600 MG/1
600 TABLET ORAL 4 TIMES DAILY
Qty: 120 TABLET | Refills: 5 | Status: SHIPPED | OUTPATIENT
Start: 2025-08-08

## 2025-08-08 RX ORDER — MORPHINE SULFATE 30 MG/1
30 TABLET, FILM COATED, EXTENDED RELEASE ORAL 2 TIMES DAILY
Qty: 60 TABLET | Refills: 0 | Status: SHIPPED | OUTPATIENT
Start: 2025-08-08

## 2025-08-21 ENCOUNTER — OFFICE VISIT (OUTPATIENT)
Dept: NEUROLOGY | Facility: CLINIC | Age: 40
End: 2025-08-21
Payer: MEDICAID

## 2025-08-21 VITALS — WEIGHT: 181 LBS | BODY MASS INDEX: 32.07 KG/M2 | HEIGHT: 63 IN

## 2025-08-21 DIAGNOSIS — G43.109 MIGRAINE EQUIVALENT SYNDROME: Primary | ICD-10-CM

## 2025-08-21 DIAGNOSIS — R56.9 SEIZURES: ICD-10-CM

## 2025-08-21 DIAGNOSIS — Z86.69 HX OF MIGRAINES: ICD-10-CM

## 2025-08-21 PROCEDURE — 1159F MED LIST DOCD IN RCRD: CPT | Performed by: PSYCHIATRY & NEUROLOGY

## 2025-08-21 PROCEDURE — 99214 OFFICE O/P EST MOD 30 MIN: CPT | Performed by: PSYCHIATRY & NEUROLOGY

## 2025-08-21 PROCEDURE — 1160F RVW MEDS BY RX/DR IN RCRD: CPT | Performed by: PSYCHIATRY & NEUROLOGY

## 2025-08-21 RX ORDER — PHENAZOPYRIDINE HYDROCHLORIDE 100 MG/1
TABLET, FILM COATED ORAL
COMMUNITY
Start: 2025-06-09

## 2025-08-21 RX ORDER — DIVALPROEX SODIUM 500 MG/1
1500 TABLET, FILM COATED, EXTENDED RELEASE ORAL DAILY
Qty: 270 TABLET | Refills: 3 | Status: SHIPPED | OUTPATIENT
Start: 2025-08-21

## (undated) DEVICE — PK PROC TURNOVER

## (undated) DEVICE — GOWN,REINFORCE,POLY,SIRUS,BREATH SLV,XLG: Brand: MEDLINE

## (undated) DEVICE — CATH DIAG IMPULSE FL4 6F 100CM

## (undated) DEVICE — DECANTER: Brand: UNBRANDED

## (undated) DEVICE — OCCLUSIVE GAUZE STRIP OVERWRAP,3% BISMUTH TRIBROMOPHENATE IN PETROLATUM BLEND: Brand: XEROFORM

## (undated) DEVICE — GW PTFE EMERALD HEPCOAT FC J TIP STD .035 3MM 150CM

## (undated) DEVICE — SUT VIC 3/0 CT2 27IN J232H

## (undated) DEVICE — Device: Brand: CARTO 3

## (undated) DEVICE — DRAPE,U/ SHT,SPLIT,PLAS,STERIL: Brand: MEDLINE

## (undated) DEVICE — CATH DIAG IMPULSE FR4 6F 100CM

## (undated) DEVICE — PINNACLE INTRODUCER SHEATH: Brand: PINNACLE

## (undated) DEVICE — PAD UNDERCAST WYTEX 4IN 4YD LF STRL

## (undated) DEVICE — Device

## (undated) DEVICE — MICRO SAGITTAL BLADE (9.5 X 0.4 X 25.5MM)

## (undated) DEVICE — GW EMR FIX EXCHG J STD .035 3MM 260CM

## (undated) DEVICE — PK TRY HEART CATH 50

## (undated) DEVICE — APPL CHLORAPREP W/TINT 10.5ML ORNG

## (undated) DEVICE — CUFF SCD HEMOFORCE SEQ CALF STD MD

## (undated) DEVICE — CUFF TOURNI 1BLADDER 1PRT 18IN STRL

## (undated) DEVICE — SUT VIC 2/0 SH 27IN

## (undated) DEVICE — CATH DIAG IMPULSE PIG .056 6F 110CM

## (undated) DEVICE — Device: Brand: WEBSTER CS

## (undated) DEVICE — SOL IRRIG H2O 1000ML STRL

## (undated) DEVICE — GLV SURG TRIUMPH GREEN W/ALOE PF LTX 8 STRL

## (undated) DEVICE — BLD CUT FORMLA AGGR 2.5MM

## (undated) DEVICE — ELECTRD DEFIB M/FUNC PROPADZ RADIOL 2PK

## (undated) DEVICE — PK EXTREM 50

## (undated) DEVICE — UNDYED BRAIDED (POLYGLACTIN 910), SYNTHETIC ABSORBABLE SUTURE: Brand: COATED VICRYL

## (undated) DEVICE — PAD E/S GRND SGL/FOIL 9FT/CORD DISP

## (undated) DEVICE — BNDG ESMARK 6INX9FT STRL

## (undated) DEVICE — SOL IRRIG NACL 9PCT 1000ML BTL

## (undated) DEVICE — SPNG GZ WOVN 4X4IN 12PLY 10/BX STRL

## (undated) DEVICE — Device: Brand: REFERENCE PATCH CARTO 3

## (undated) DEVICE — BNDG ESMARK 4IN 12FT LF STRL BLU

## (undated) DEVICE — SUT ETHLN 3/0 FS1 30IN 669H

## (undated) DEVICE — GLV SURG BIOGEL M LTX PF 7 1/2